# Patient Record
Sex: MALE | Race: WHITE | NOT HISPANIC OR LATINO | Employment: OTHER | URBAN - METROPOLITAN AREA
[De-identification: names, ages, dates, MRNs, and addresses within clinical notes are randomized per-mention and may not be internally consistent; named-entity substitution may affect disease eponyms.]

---

## 2017-04-03 DIAGNOSIS — I70.0 ATHEROSCLEROSIS OF AORTA (HCC): ICD-10-CM

## 2017-04-24 ENCOUNTER — HOSPITAL ENCOUNTER (OUTPATIENT)
Dept: NON INVASIVE DIAGNOSTICS | Facility: CLINIC | Age: 67
Discharge: HOME | End: 2017-04-24
Payer: COMMERCIAL

## 2017-04-24 DIAGNOSIS — I70.0 ATHEROSCLEROSIS OF AORTA (HCC): ICD-10-CM

## 2017-04-24 PROCEDURE — 93978 VASCULAR STUDY: CPT

## 2017-05-08 ENCOUNTER — TRANSCRIBE ORDERS (OUTPATIENT)
Dept: ADMINISTRATIVE | Facility: HOSPITAL | Age: 67
End: 2017-05-08

## 2017-05-08 ENCOUNTER — ALLSCRIPTS OFFICE VISIT (OUTPATIENT)
Dept: OTHER | Facility: OTHER | Age: 67
End: 2017-05-08

## 2017-05-08 ENCOUNTER — APPOINTMENT (OUTPATIENT)
Dept: LAB | Facility: CLINIC | Age: 67
End: 2017-05-08
Payer: COMMERCIAL

## 2017-05-08 DIAGNOSIS — I70.212 ATHEROSCLEROSIS OF NATIVE ARTERY OF LEFT LOWER EXTREMITY WITH INTERMITTENT CLAUDICATION (HCC): ICD-10-CM

## 2017-05-08 DIAGNOSIS — I70.212 ATHEROSCLEROSIS OF NATIVE ARTERY OF LEFT LOWER EXTREMITY WITH INTERMITTENT CLAUDICATION (HCC): Primary | ICD-10-CM

## 2017-05-08 LAB
BUN SERPL-MCNC: 13 MG/DL (ref 5–25)
CREAT SERPL-MCNC: 0.79 MG/DL (ref 0.6–1.3)
GFR SERPL CREATININE-BSD FRML MDRD: >60 ML/MIN/1.73SQ M

## 2017-05-08 PROCEDURE — 36415 COLL VENOUS BLD VENIPUNCTURE: CPT

## 2017-05-08 PROCEDURE — 84520 ASSAY OF UREA NITROGEN: CPT

## 2017-05-08 PROCEDURE — 82565 ASSAY OF CREATININE: CPT

## 2017-05-15 ENCOUNTER — HOSPITAL ENCOUNTER (OUTPATIENT)
Dept: CT IMAGING | Facility: HOSPITAL | Age: 67
Discharge: HOME | End: 2017-05-15
Attending: SURGERY
Payer: COMMERCIAL

## 2017-05-15 DIAGNOSIS — I70.212 ATHEROSCLEROSIS OF NATIVE ARTERY OF LEFT LOWER EXTREMITY WITH INTERMITTENT CLAUDICATION (HCC): ICD-10-CM

## 2017-05-15 PROCEDURE — 75635 CT ANGIO ABDOMINAL ARTERIES: CPT

## 2017-05-15 RX ADMIN — IOHEXOL 120 ML: 350 INJECTION, SOLUTION INTRAVENOUS at 19:22

## 2017-05-22 ENCOUNTER — ALLSCRIPTS OFFICE VISIT (OUTPATIENT)
Dept: OTHER | Facility: OTHER | Age: 67
End: 2017-05-22

## 2017-06-20 ENCOUNTER — GENERIC CONVERSION - ENCOUNTER (OUTPATIENT)
Dept: OTHER | Facility: OTHER | Age: 67
End: 2017-06-20

## 2017-07-06 ENCOUNTER — GENERIC CONVERSION - ENCOUNTER (OUTPATIENT)
Dept: OTHER | Facility: OTHER | Age: 67
End: 2017-07-06

## 2018-01-11 NOTE — MISCELLANEOUS
Message  filled out disability forms and gave to front staff to scan in  I called pt and lmom notifying him  Active Problems    1  Abdominal pain, epigastric (789 06) (R10 13)   2  Acute sinusitis (461 9) (J01 90)   3  Acute upper respiratory infection (465 9) (J06 9)   4  Aortic sclerosis   5  Aortic stenosis (424 1) (I35 0)   6  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 (440 21) (I70 212)   7  Bronchitis (490) (J40)   8  Cough (786 2) (R05)   9  Dermatitis (692 9) (L30 9)   10  Fatigue (780 79) (R53 83)   11  History of allergy (V15 09) (Z88 9)   12  Hypertension (401 9) (I10)   13  Iliac artery stenosis, left (447 1) (I77 1)   14  Nontoxic single thyroid nodule (241 0) (E04 1)   15  Peripheral arterial disease (443 9) (I73 9)   16  Screening for genitourinary condition (V81 6) (Z13 89)   17  Screening for neurological condition (V80 09) (Z13 89)   18  Solitary pulmonary nodule (793 11) (R91 1)   19  Tendinitis of shoulder, unspecified laterality (726 10) (M75 80)   20  Trigger middle finger of right hand (727 03) (M65 331)    Current Meds   1  AmLODIPine Besylate 2 5 MG Oral Tablet; take one tablet by mouth every day; Therapy: 02Apr2012 to (Evaluate:16Nov2017)  Requested for: 21Nov2016; Last   Rx:21Nov2016 Ordered   2  Centrum Ultra Mens TABS Recorded   3  Tylenol 325 MG Oral Tablet; 2qd Recorded    Allergies    1   Aleve TABS    Signatures   Electronically signed by : Nicky Dennis, ; Jul 6 2017  3:44PM EST                       (Author)

## 2018-01-12 VITALS
HEIGHT: 70 IN | SYSTOLIC BLOOD PRESSURE: 130 MMHG | DIASTOLIC BLOOD PRESSURE: 72 MMHG | HEART RATE: 80 BPM | WEIGHT: 186 LBS | RESPIRATION RATE: 16 BRPM | BODY MASS INDEX: 26.63 KG/M2

## 2018-01-13 VITALS
HEART RATE: 72 BPM | HEIGHT: 70 IN | SYSTOLIC BLOOD PRESSURE: 134 MMHG | WEIGHT: 182.44 LBS | RESPIRATION RATE: 16 BRPM | DIASTOLIC BLOOD PRESSURE: 78 MMHG | BODY MASS INDEX: 26.12 KG/M2

## 2018-01-22 ENCOUNTER — ALLSCRIPTS OFFICE VISIT (OUTPATIENT)
Dept: OTHER | Facility: OTHER | Age: 68
End: 2018-01-22

## 2018-01-23 NOTE — PROGRESS NOTES
Assessment   1  Acute sinusitis (461 9) (J01 90)   2  Cough (786 2) (R05)    Plan   Acute sinusitis    · Amoxicillin 875 MG Oral Tablet; TAKE 1 TABLET EVERY 12 HOURS DAILY   · PredniSONE 10 MG Oral Tablet; TAKE 3 TABLETS DAILY FOR 2 DAYS, 2 TABLETS    DAILY FOR 2 DAYS AND 1 TABLET DAILY FOR 2 DAYS, THEN STOP  Acute sinusitis, Cough    · Promethazine-DM 6 25-15 MG/5ML Oral Syrup; SWALLOW 1 TSP Every 6 hours    PRN cough    Discussion/Summary      Acute sinusitis with patient plan to treat with 10 day course of amoxicillin and 6 day course of prednisone with patient plan to treat with promethazine DM for congestion and cough   patient instructed to call if not feeling better within 72 hours or if symptoms worsen  The patient was counseled regarding instructions for management,-- risk factor reductions,-- impressions,-- risks and benefits of treatment options,-- importance of compliance with treatment  Possible side effects of new medications were reviewed with the patient/guardian today  The treatment plan was reviewed with the patient/guardian  The patient/guardian understands and agrees with the treatment plan      Chief Complaint   1  Cold Symptoms    History of Present Illness   HPI: 78year old male presenting with a dry nonproductive cough and wheezing for past 4-5 days  Patient reports chills and headache but denies fever and/or but generalized body aches being associated with current illness  Patient was taking OTC medications to treat symptoms without much relief  Cold Symptoms: Elyse Chino presents with complaints of cold symptoms        Associated symptoms include dry cough,-- headache,-- wheezing-- and-- chills, but-- no sneezing,-- no nasal congestion,-- no runny nose,-- no post nasal drainage,-- no scratchy throat,-- no sore throat,-- no hoarseness,-- no productive cough,-- no facial pressure,-- no facial pain,-- no plugged ear(s),-- no ear pain,-- no swollen lymph nodes,-- no shortness of breath,-- no fatigue,-- no weakness,-- no nausea,-- no vomiting,-- no diarrhea-- and-- no fever  Review of Systems        Constitutional: chills-- and-- feeling tired  ENT: no complaints of earache, no loss of hearing, no nosebleeds or nasal discharge, no sore throat or hoarseness  Cardiovascular: no complaints of slow or fast heart rate, no chest pain, no palpitations, no leg claudication or lower extremity edema  Respiratory: cough  Gastrointestinal: no complaints of abdominal pain, no constipation, no nausea or vomiting, no diarrhea or bloody stools  Genitourinary: no complaints of dysuria or incontinence, no hesitancy, no nocturia, no genital lesion, no inadequacy of penile erection  Musculoskeletal: no complaints of arthralgia, no myalgia, no joint swelling or stiffness, no limb pain or swelling  Integumentary: no complaints of skin rash or lesion, no itching or dry skin, no skin wounds  Neurological: headache  Preventive Quality 65 and Older: Falls Risk: The patient fell 0 times in the past 12 months  The patient is currently asymptomatic Symptoms Include: The patient currently has no urinary incontinence symptoms  Active Problems   1  Abdominal pain, epigastric (789 06) (R10 13)   2  Acute sinusitis (461 9) (J01 90)   3  Acute upper respiratory infection (465 9) (J06 9)   4  Aortic sclerosis   5  Aortic stenosis (424 1) (I35 0)   6  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 (440 21) (I70 212)   7  Bronchitis (490) (J40)   8  Cough (786 2) (R05)   9  Dermatitis (692 9) (L30 9)   10  Fatigue (780 79) (R53 83)   11  History of allergy (V15 09) (Z88 9)   12  Hypertension (401 9) (I10)   13  Iliac artery stenosis, left (447 1) (I77 1)   14  Nontoxic single thyroid nodule (241 0) (E04 1)   15  Peripheral arterial disease (443 9) (I73 9)   16  Screening for genitourinary condition (V81 6) (Z13 89)   17   Screening for neurological condition (V80 09) (Z13 89)   18  Solitary pulmonary nodule (793 11) (R91 1)   19  Tendinitis of shoulder, unspecified laterality (726 10) (M75 80)   20  Trigger middle finger of right hand (727 03) (M65 331)    Past Medical History   Active Problems And Past Medical History Reviewed: The active problems and past medical history were reviewed and updated today  Family History   Mother    1  Family history of Breast Cancer (V16 3)   2  Family history of Cancer   3  Family history of Diabetes Mellitus (V18 0)  Family History Reviewed: The family history was reviewed and updated today  Social History    · Denied: History of Alcohol Use (History)   · Daily Coffee Consumption (___ Cups/Day)   · Daily Cola Consumption (___ Cans/Day)   · Denied: History of Daily Tea Consumption (___ Cups/Day)   · Dependence on nicotine from cigarettes (305 1) (F17 210)   · Marital History - Currently    · Personal Protective Equipment Seatbelts  The social history was reviewed and updated today  Surgical History   1  History of Appendectomy   2  History of Heart Surgery   3  History of Heart Surgery   4  History of Intraoperative Transluminal Angioplasty Aortic   5  History of Knee Surgery   6  History of PTA Iliac Initial Stenosis With Stent   7  History of Thyroid Surgery   8  History of Tonsillectomy  Surgical History Reviewed: The surgical history was reviewed and updated today  Current Meds    1  AmLODIPine Besylate 2 5 MG Oral Tablet; take one tablet by mouth every day; Therapy: 02Apr2012 to (Evaluate:16Nov2017)  Requested for: 21Nov2016; Last     Rx:21Nov2016 Ordered   2  Centrum Ultra Mens TABS Recorded   3  Tylenol 325 MG Oral Tablet; 2qd Recorded     The medication list was reviewed and updated today  Allergies   1   Aleve TABS    Vitals    Recorded: [id number] 02:38PM   Temperature 98 5 F   Heart Rate 76   Systolic 783   Diastolic 84   Height 5 ft 10 in   Weight 192 lb 6 oz   BMI Calculated 27 6   BSA Calculated 2 05     Physical Exam        Constitutional      General appearance: No acute distress, well appearing and well nourished  Eyes      Conjunctiva and lids: No swelling, erythema, or discharge  Pupils and irises: Equal, round and reactive to light  Ears, Nose, Mouth, and Throat      External inspection of ears and nose: Normal        Otoscopic examination: Abnormal  -- TM dull bilaterally  Nasal mucosa, septum, and turbinates: Abnormal  -- Bilateral erythematous turbinates with thick nasal congestion injection present positive head tilt  Oropharynx: Abnormal  -- posterior pharynx erythema with visible post nasal drip injection  Pulmonary      Respiratory effort: No increased work of breathing or signs of respiratory distress  Auscultation of lungs: Abnormal  -- few scattered rhonchi in upper airways and Expiratory wheezing noted in upper lung fields  Cardiovascular      Auscultation of heart: Normal rate and rhythm, normal S1 and S2, without murmurs  Examination of extremities for edema and/or varicosities: Normal        Abdomen      Abdomen: Non-tender, no masses  Lymphatic      Palpation of lymph nodes in neck: Abnormal  -- positive anterior cervical lymphadenopathy  Musculoskeletal      Digits and nails: Normal without clubbing or cyanosis  Psychiatric      Orientation to person, place and time: Normal        Mood and affect: Normal           Results/Data   PHQ-2 Adult Depression Screening [id number] 02:37PM User, s      Test Name Result Flag Reference   PHQ-2 Adult Depression Score 0     Over the last two weeks, how often have you been bothered by any of the following problems?      Little interest or pleasure in doing things: Not at all - 0     Feeling down, depressed, or hopeless: Not at all - 0   PHQ-2 Adult Depression Screening Negative          Signatures    Electronically signed by : Pearl Lancaster Raoul Danielle; Jan 22 2018  7:29PM EST                       (Author)     Electronically signed by : PHIL Keller ; Jan 22 2018  9:13PM EST                       (Author)

## 2018-04-11 DIAGNOSIS — I10 ESSENTIAL HYPERTENSION: Primary | ICD-10-CM

## 2018-04-11 RX ORDER — AMLODIPINE BESYLATE 2.5 MG/1
1 TABLET ORAL DAILY
COMMUNITY
Start: 2012-04-02 | End: 2018-11-19 | Stop reason: ALTCHOICE

## 2018-04-12 RX ORDER — AMLODIPINE BESYLATE 2.5 MG/1
2.5 TABLET ORAL DAILY
Qty: 90 TABLET | Refills: 3 | Status: SHIPPED | OUTPATIENT
Start: 2018-04-12 | End: 2019-06-24 | Stop reason: SDUPTHER

## 2018-11-19 ENCOUNTER — OFFICE VISIT (OUTPATIENT)
Dept: FAMILY MEDICINE CLINIC | Facility: CLINIC | Age: 68
End: 2018-11-19
Payer: MEDICARE

## 2018-11-19 VITALS
HEIGHT: 70 IN | TEMPERATURE: 98.4 F | BODY MASS INDEX: 26.97 KG/M2 | HEART RATE: 76 BPM | SYSTOLIC BLOOD PRESSURE: 130 MMHG | WEIGHT: 188.4 LBS | DIASTOLIC BLOOD PRESSURE: 70 MMHG

## 2018-11-19 DIAGNOSIS — I73.9 PERIPHERAL ARTERIAL DISEASE (HCC): ICD-10-CM

## 2018-11-19 DIAGNOSIS — I35.0 AORTIC VALVE STENOSIS, ETIOLOGY OF CARDIAC VALVE DISEASE UNSPECIFIED: ICD-10-CM

## 2018-11-19 DIAGNOSIS — R10.13 ABDOMINAL PAIN, EPIGASTRIC: ICD-10-CM

## 2018-11-19 DIAGNOSIS — Z12.5 SCREENING FOR PROSTATE CANCER: ICD-10-CM

## 2018-11-19 DIAGNOSIS — I70.212 ATHEROSCLER OF NATIVE ARTERY OF LEFT LEG WITH INTERMIT CLAUDICATION (HCC): ICD-10-CM

## 2018-11-19 DIAGNOSIS — R53.83 FATIGUE, UNSPECIFIED TYPE: Primary | ICD-10-CM

## 2018-11-19 DIAGNOSIS — I77.1 ILIAC ARTERY STENOSIS, LEFT (HCC): ICD-10-CM

## 2018-11-19 DIAGNOSIS — R09.81 NASAL CONGESTION: ICD-10-CM

## 2018-11-19 DIAGNOSIS — G89.29 CHRONIC PAIN OF RIGHT KNEE: ICD-10-CM

## 2018-11-19 DIAGNOSIS — Z12.2 ENCOUNTER FOR SCREENING FOR LUNG CANCER: ICD-10-CM

## 2018-11-19 DIAGNOSIS — M25.561 CHRONIC PAIN OF RIGHT KNEE: ICD-10-CM

## 2018-11-19 DIAGNOSIS — J44.9 MODERATE COPD (CHRONIC OBSTRUCTIVE PULMONARY DISEASE) (HCC): ICD-10-CM

## 2018-11-19 DIAGNOSIS — R06.9 ABNORMAL BREATHING: ICD-10-CM

## 2018-11-19 DIAGNOSIS — I10 ESSENTIAL HYPERTENSION: ICD-10-CM

## 2018-11-19 DIAGNOSIS — R05.9 COUGH: ICD-10-CM

## 2018-11-19 PROCEDURE — 99215 OFFICE O/P EST HI 40 MIN: CPT | Performed by: FAMILY MEDICINE

## 2018-11-19 PROCEDURE — 94010 BREATHING CAPACITY TEST: CPT | Performed by: FAMILY MEDICINE

## 2018-11-19 RX ORDER — LORATADINE 10 MG/1
10 TABLET ORAL DAILY
Qty: 30 TABLET | Refills: 1 | Status: SHIPPED | OUTPATIENT
Start: 2018-11-19 | End: 2019-05-14 | Stop reason: SDUPTHER

## 2018-11-19 RX ORDER — FLUTICASONE PROPIONATE 50 MCG
2 SPRAY, SUSPENSION (ML) NASAL DAILY
Qty: 1 BOTTLE | Refills: 1 | Status: SHIPPED | OUTPATIENT
Start: 2018-11-19 | End: 2019-05-14 | Stop reason: SDUPTHER

## 2018-11-19 RX ORDER — OMEPRAZOLE 20 MG/1
20 CAPSULE, DELAYED RELEASE ORAL DAILY
Qty: 30 CAPSULE | Refills: 0 | Status: SHIPPED | OUTPATIENT
Start: 2018-11-19 | End: 2018-12-19

## 2018-11-19 NOTE — PROGRESS NOTES
Assessment/Plan: Aortic stenosis  Asymptomatic  Cont to watch  Recheck 6m    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 ([facility] )  Overdue for f/u  Recheck CT angio of abd/lwoer extremities  Urged smoking cessation  Cont present meds  Check labs  Recheck 4 weeks    Hypertension  Stable  Cont present treatment  Check labs  Recheck 6m    Iliac artery stenosis, left (HCC)  Recheck CT angiogram  Cont present care    Peripheral arterial disease ([facility] )  Recheck CT angiogram    Abdominal pain, epigastric  ? Cause  Check labs  Trial of omeprazole  Recheck 4 weeks    Fatigue  ? cause  Refer for home sleep study  Check labs  Recheck 4 weeks    Moderate COPD (chronic obstructive pulmonary disease) ([facility])  Check Lung cancer screen CT  Trial of Dulera 200/5, 2 puff bid  Recheck 4 weeks       Diagnoses and all orders for this visit:    Fatigue, unspecified type  -     TSH, 3rd generation with Free T4 reflex; Future  -     Home Study; Future    Essential hypertension    Peripheral arterial disease ([facility] )  -     CTA abdominal w run off wo contrast; Future  -     CBC and differential; Future  -     Lipid panel; Future  -     Comprehensive metabolic panel; Future    Iliac artery stenosis, left (HCC)  -     CTA abdominal w run off wo contrast; Future    Abdominal pain, epigastric  -     Ambulatory referral to Gastroenterology; Future  -     omeprazole (PriLOSEC) 20 mg delayed release capsule; Take 1 capsule (20 mg total) by mouth daily    Abnormal breathing  -     POCT spirometry  -     Home Study; Future    Cough  Comments:  trial of Dulera (sample given)  Orders:  -     POCT spirometry    Nasal congestion  Comments:  ? allergy  Start loratadine and flonase  Recheck 4 weeks  Orders:  -     loratadine (CLARITIN) 10 mg tablet;  Take 1 tablet (10 mg total) by mouth daily  -     fluticasone (FLONASE) 50 mcg/act nasal spray; 2 sprays into each nostril daily 2 sprays bilat qd    Aortic valve stenosis, etiology of cardiac valve disease unspecified    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 (HCC)    Chronic pain of right knee  Comments:  check xray  Orders:  -     XR knee 3 vw right non injury; Future    Encounter for screening for lung cancer  -     CT lung screening program; Future    Screening for prostate cancer  -     PSA, Total Screen; Future    Moderate COPD (chronic obstructive pulmonary disease) (HCC)  -     mometasone-formoterol (DULERA) 200-5 MCG/ACT inhaler; Inhale 2 puffs 2 (two) times a day Rinse mouth after use  Subjective:      Patient ID: Josh Lima is a 80 y o  male  f/u multiple med issues  - pt with long hx of worsening fatigue  Wife states that "he can sleep for four days and still be tired"  Does not feel refreshed on awakening  Has hx of snoring  Has frequent awakening and coughs a lot at night    - Still smoking approx 1/4-1/2 ppd  Does get SOB with exertion  (+) frequent coughing  No hemoptysis  - (+) chronic nasal congestion over the last year  Occ sinus pressure  No fever/chills  - frequent diarrhea with occasional abd pain (epigastric)  Does have hx of gallstones  Seems to be off and on over the last year  No hematochezia or melena  No leakage  No change in weight  Denies depression or anhedonia  - has some worsening R knee pain  May have twisted it a year ago  No swelling  Worse witjh prolonged standing/ambulating  No swelling  - L leg cramping, worse at night  Has hx of PAD - has not had repeat vascular study or seen Vasc surgery in over 2 years  Has hx of 3 leg stents  - pt overdue for labs, colonoscopy      GI Problem   The primary symptoms include fatigue, abdominal pain, diarrhea, myalgias and arthralgias  Primary symptoms do not include fever, nausea or vomiting  The myalgias are not associated with weakness  The illness does not include chills  Cough   Associated symptoms include myalgias   Pertinent negatives include no chills, fever or sore throat  The following portions of the patient's history were reviewed and updated as appropriate:   He  has no past medical history on file  He   Patient Active Problem List    Diagnosis Date Noted    Moderate COPD (chronic obstructive pulmonary disease) ([place] [address] ) 11/20/2018    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 ([place] [address] ) 05/08/2017    Iliac artery stenosis, left ([place] [address] ) 05/08/2017    Aortic stenosis 05/08/2017    Abdominal pain, epigastric 01/08/2015    Fatigue 01/21/2013    Hypertension 01/21/2013    Peripheral arterial disease ([place] [address] ) 10/05/2012     He  has no past surgical history on file  He  has no tobacco, alcohol, and drug history on file  Current Outpatient Prescriptions   Medication Sig Dispense Refill    amLODIPine (NORVASC) 2 5 mg tablet Take 1 tablet (2 5 mg total) by mouth daily 90 tablet 3    Multiple Vitamins-Minerals (CENTRUM ULTRA MENS) TABS Take by mouth      fluticasone (FLONASE) 50 mcg/act nasal spray 2 sprays into each nostril daily 2 sprays bilat qd 1 Bottle 1    loratadine (CLARITIN) 10 mg tablet Take 1 tablet (10 mg total) by mouth daily 30 tablet 1    mometasone-formoterol (DULERA) 200-5 MCG/ACT inhaler Inhale 2 puffs 2 (two) times a day Rinse mouth after use  1 Inhaler 0    omeprazole (PriLOSEC) 20 mg delayed release capsule Take 1 capsule (20 mg total) by mouth daily 30 capsule 0     No current facility-administered medications for this visit  He is allergic to naproxen       Review of Systems   Constitutional: Positive for fatigue  Negative for chills and fever  HENT: Positive for congestion and sinus pressure  Negative for sinus pain, sore throat and voice change  Eyes: Negative  Respiratory: Positive for cough and choking  Cardiovascular: Negative  Gastrointestinal: Positive for abdominal pain and diarrhea  Negative for abdominal distention, blood in stool, nausea, rectal pain and vomiting  Endocrine: Negative  Genitourinary: Negative  Musculoskeletal: Positive for arthralgias, gait problem, joint swelling and myalgias  Negative for neck pain and neck stiffness  Skin: Negative  Allergic/Immunologic: Negative  Neurological: Negative for dizziness, weakness, light-headedness and numbness  Hematological: Negative  Psychiatric/Behavioral: Negative  Objective:      /70   Pulse 76   Temp 98 4 °F (36 9 °C)   Ht 5' 10" (1 778 m)   Wt 85 5 kg (188 lb 6 4 oz)   BMI 27 03 kg/m²          Physical Exam   Constitutional: He is oriented to person, place, and time  He appears well-developed and well-nourished  HENT:   Head: Normocephalic and atraumatic  Right Ear: External ear normal    Left Ear: External ear normal    Mouth/Throat: Oropharynx is clear and moist    Mild nasal congestion without visible masses   Eyes: Pupils are equal, round, and reactive to light  Conjunctivae and EOM are normal    Neck: Normal range of motion  Neck supple  No JVD present  No thyromegaly present  Cardiovascular: Normal rate and regular rhythm  Murmur (mild RERE over RUSB) heard  Unable to appreciate pedal or popliteal pulses   Pulmonary/Chest: Effort normal and breath sounds normal  He exhibits no tenderness  Poor air movement throughout   Abdominal: Soft  Bowel sounds are normal  He exhibits no distension and no mass  There is no tenderness  Musculoskeletal: Normal range of motion  He exhibits no edema  Lymphadenopathy:     He has no cervical adenopathy  Neurological: He is alert and oriented to person, place, and time  He has normal reflexes  No cranial nerve deficit  He exhibits normal muscle tone  Coordination normal    Skin: Skin is warm  Psychiatric: He has a normal mood and affect   His behavior is normal  Judgment and thought content normal        Office Spirometry Results: moderate-severe obstruction

## 2018-11-20 PROBLEM — J44.9 MODERATE COPD (CHRONIC OBSTRUCTIVE PULMONARY DISEASE) (HCC): Status: ACTIVE | Noted: 2018-11-20

## 2018-11-20 NOTE — ASSESSMENT & PLAN NOTE
Overdue for f/u  Recheck CT angio of abd/lwoer extremities  Urged smoking cessation  Cont present meds  Check labs   Recheck 4 weeks

## 2018-11-23 ENCOUNTER — APPOINTMENT (OUTPATIENT)
Dept: LAB | Facility: CLINIC | Age: 68
End: 2018-11-23
Payer: MEDICARE

## 2018-11-23 ENCOUNTER — TRANSCRIBE ORDERS (OUTPATIENT)
Dept: LAB | Facility: CLINIC | Age: 68
End: 2018-11-23

## 2018-11-23 DIAGNOSIS — I73.9 PERIPHERAL ARTERIAL DISEASE (HCC): ICD-10-CM

## 2018-11-23 DIAGNOSIS — Z12.5 SCREENING FOR PROSTATE CANCER: ICD-10-CM

## 2018-11-23 DIAGNOSIS — R53.83 FATIGUE, UNSPECIFIED TYPE: ICD-10-CM

## 2018-11-23 LAB
ALBUMIN SERPL BCP-MCNC: 3.6 G/DL (ref 3.5–5)
ALP SERPL-CCNC: 90 U/L (ref 46–116)
ALT SERPL W P-5'-P-CCNC: 35 U/L (ref 12–78)
ANION GAP SERPL CALCULATED.3IONS-SCNC: 2 MMOL/L (ref 4–13)
AST SERPL W P-5'-P-CCNC: 29 U/L (ref 5–45)
BASOPHILS # BLD AUTO: 0.01 THOUSANDS/ΜL (ref 0–0.1)
BASOPHILS NFR BLD AUTO: 0 % (ref 0–1)
BILIRUB SERPL-MCNC: 0.6 MG/DL (ref 0.2–1)
BUN SERPL-MCNC: 16 MG/DL (ref 5–25)
CALCIUM SERPL-MCNC: 9 MG/DL (ref 8.3–10.1)
CHLORIDE SERPL-SCNC: 103 MMOL/L (ref 100–108)
CHOLEST SERPL-MCNC: 208 MG/DL (ref 50–200)
CO2 SERPL-SCNC: 29 MMOL/L (ref 21–32)
CREAT SERPL-MCNC: 0.88 MG/DL (ref 0.6–1.3)
EOSINOPHIL # BLD AUTO: 0.02 THOUSAND/ΜL (ref 0–0.61)
EOSINOPHIL NFR BLD AUTO: 1 % (ref 0–6)
ERYTHROCYTE [DISTWIDTH] IN BLOOD BY AUTOMATED COUNT: 14 % (ref 11.6–15.1)
GFR SERPL CREATININE-BSD FRML MDRD: 89 ML/MIN/1.73SQ M
GLUCOSE P FAST SERPL-MCNC: 143 MG/DL (ref 65–99)
HCT VFR BLD AUTO: 42.9 % (ref 36.5–49.3)
HDLC SERPL-MCNC: 39 MG/DL (ref 40–60)
HGB BLD-MCNC: 13.9 G/DL (ref 12–17)
IMM GRANULOCYTES # BLD AUTO: 0.01 THOUSAND/UL (ref 0–0.2)
IMM GRANULOCYTES NFR BLD AUTO: 0 % (ref 0–2)
LDLC SERPL CALC-MCNC: 101 MG/DL (ref 0–100)
LYMPHOCYTES # BLD AUTO: 0.53 THOUSANDS/ΜL (ref 0.6–4.47)
LYMPHOCYTES NFR BLD AUTO: 15 % (ref 14–44)
MCH RBC QN AUTO: 28.5 PG (ref 26.8–34.3)
MCHC RBC AUTO-ENTMCNC: 32.4 G/DL (ref 31.4–37.4)
MCV RBC AUTO: 88 FL (ref 82–98)
MONOCYTES # BLD AUTO: 0.47 THOUSAND/ΜL (ref 0.17–1.22)
MONOCYTES NFR BLD AUTO: 13 % (ref 4–12)
NEUTROPHILS # BLD AUTO: 2.58 THOUSANDS/ΜL (ref 1.85–7.62)
NEUTS SEG NFR BLD AUTO: 71 % (ref 43–75)
NONHDLC SERPL-MCNC: 169 MG/DL
NRBC BLD AUTO-RTO: 0 /100 WBCS
PLATELET # BLD AUTO: 148 THOUSANDS/UL (ref 149–390)
PMV BLD AUTO: 10.7 FL (ref 8.9–12.7)
POTASSIUM SERPL-SCNC: 4 MMOL/L (ref 3.5–5.3)
PROT SERPL-MCNC: 7.1 G/DL (ref 6.4–8.2)
PSA SERPL-MCNC: 0.9 NG/ML (ref 0–4)
RBC # BLD AUTO: 4.88 MILLION/UL (ref 3.88–5.62)
SODIUM SERPL-SCNC: 134 MMOL/L (ref 136–145)
TRIGL SERPL-MCNC: 341 MG/DL
TSH SERPL DL<=0.05 MIU/L-ACNC: 1.57 UIU/ML (ref 0.36–3.74)
WBC # BLD AUTO: 3.62 THOUSAND/UL (ref 4.31–10.16)

## 2018-11-23 PROCEDURE — 80061 LIPID PANEL: CPT

## 2018-11-23 PROCEDURE — 80053 COMPREHEN METABOLIC PANEL: CPT

## 2018-11-23 PROCEDURE — G0103 PSA SCREENING: HCPCS

## 2018-11-23 PROCEDURE — 36415 COLL VENOUS BLD VENIPUNCTURE: CPT

## 2018-11-23 PROCEDURE — 85025 COMPLETE CBC W/AUTO DIFF WBC: CPT

## 2018-11-23 PROCEDURE — 84443 ASSAY THYROID STIM HORMONE: CPT

## 2018-11-27 ENCOUNTER — TELEPHONE (OUTPATIENT)
Dept: SLEEP CENTER | Facility: CLINIC | Age: 68
End: 2018-11-27

## 2018-11-27 NOTE — TELEPHONE ENCOUNTER
----- Message from Nyasia Akbar MD sent at 11/20/2018  5:15 PM EST -----  Approved  ----- Message -----  From: Glendy Corcoran: 11/20/2018   9:37 AM  To: Sleep Medicine [place] [facility], #    PLEASE REVIEW FOR APPROVAL OR DENIAL AND WHY

## 2018-11-28 ENCOUNTER — HOSPITAL ENCOUNTER (OUTPATIENT)
Dept: SLEEP CENTER | Facility: CLINIC | Age: 68
Discharge: HOME | End: 2018-11-28
Payer: MEDICARE

## 2018-11-28 DIAGNOSIS — R06.9 ABNORMAL BREATHING: ICD-10-CM

## 2018-11-28 DIAGNOSIS — R53.83 FATIGUE, UNSPECIFIED TYPE: ICD-10-CM

## 2018-11-28 PROCEDURE — G0399 HOME SLEEP TEST/TYPE 3 PORTA: HCPCS

## 2018-11-30 ENCOUNTER — HOSPITAL ENCOUNTER (OUTPATIENT)
Dept: CT IMAGING | Facility: HOSPITAL | Age: 68
Discharge: HOME | End: 2018-11-30
Payer: COMMERCIAL

## 2018-11-30 ENCOUNTER — HOSPITAL ENCOUNTER (OUTPATIENT)
Dept: CT IMAGING | Facility: HOSPITAL | Age: 68
Discharge: HOME | End: 2018-11-30
Payer: MEDICARE

## 2018-11-30 ENCOUNTER — HOSPITAL ENCOUNTER (OUTPATIENT)
Dept: RADIOLOGY | Facility: HOSPITAL | Age: 68
Discharge: HOME | End: 2018-11-30
Payer: MEDICARE

## 2018-11-30 DIAGNOSIS — I73.9 PERIPHERAL ARTERIAL DISEASE (HCC): ICD-10-CM

## 2018-11-30 DIAGNOSIS — I77.1 ILIAC ARTERY STENOSIS, LEFT (HCC): ICD-10-CM

## 2018-11-30 DIAGNOSIS — G89.29 CHRONIC PAIN OF RIGHT KNEE: ICD-10-CM

## 2018-11-30 DIAGNOSIS — Z12.2 ENCOUNTER FOR SCREENING FOR LUNG CANCER: ICD-10-CM

## 2018-11-30 DIAGNOSIS — M25.561 CHRONIC PAIN OF RIGHT KNEE: ICD-10-CM

## 2018-11-30 PROCEDURE — 73562 X-RAY EXAM OF KNEE 3: CPT

## 2018-11-30 PROCEDURE — 75635 CT ANGIO ABDOMINAL ARTERIES: CPT

## 2018-11-30 RX ADMIN — IOHEXOL 120 ML: 350 INJECTION, SOLUTION INTRAVENOUS at 07:45

## 2018-12-06 ENCOUNTER — TELEPHONE (OUTPATIENT)
Dept: SLEEP CENTER | Facility: CLINIC | Age: 68
End: 2018-12-06

## 2018-12-06 NOTE — TELEPHONE ENCOUNTER
----- Message from Adalgisa Timmons MD sent at 12/4/2018  9:54 AM EST -----  Sleep clinic if needed    Thanks

## 2018-12-14 ENCOUNTER — OFFICE VISIT (OUTPATIENT)
Dept: GASTROENTEROLOGY | Facility: CLINIC | Age: 68
End: 2018-12-14
Payer: MEDICARE

## 2018-12-14 VITALS
DIASTOLIC BLOOD PRESSURE: 72 MMHG | BODY MASS INDEX: 27.52 KG/M2 | TEMPERATURE: 99.4 F | HEART RATE: 92 BPM | WEIGHT: 192.2 LBS | SYSTOLIC BLOOD PRESSURE: 139 MMHG | HEIGHT: 70 IN

## 2018-12-14 DIAGNOSIS — R10.13 ABDOMINAL PAIN, EPIGASTRIC: ICD-10-CM

## 2018-12-14 DIAGNOSIS — Z12.11 COLON CANCER SCREENING: Primary | ICD-10-CM

## 2018-12-14 DIAGNOSIS — K64.4 HEMORRHOIDS, EXTERNAL: ICD-10-CM

## 2018-12-14 PROCEDURE — 99204 OFFICE O/P NEW MOD 45 MIN: CPT | Performed by: INTERNAL MEDICINE

## 2018-12-14 NOTE — PROGRESS NOTES
Consultation - [address] [facility]  Bubba Taylor 80 y o  male MRN: [id number]          Assessment & Plan:    Very pleasant 50-year-old gentleman who is a retired  about, here for average risk screening colonoscopy, last examination was greater than 10 years ago and sounds like he had rectal polyps which were hyperplastic at that time  He was also started on PPI therapy recently  1   Screening colonoscopy:  Appears to be average risk  -we will schedule his procedure  -discussed with him the risks of the procedure including bleeding, surgery, perforation, missed polyp detection rate    2  Epigastric discomfort:  Appears to be mild and resolved at this time  -recommend the patient transition to H2 blocker and see if symptoms persist, if so we can further evaluate    _____________________________________________________________        CC:   Evaluation for colonoscopy    HPI:  Bubba Taylor is a 80 y o male who was referred for evaluation of colonoscopy  As you know this is a 50-year-old gentleman, reports his last colonoscopy was greater than 10 years ago  He was told he had small polyps, possibly hyperplastic polyps at that time  Patient denies any significant lower GI symptoms, denies any abdominal pain, nausea, vomiting, heartburn, dysphagia, diarrhea, constipation, melena, rectal bleeding  He does have irritated hemorrhoids at times which can bleed a small amount when he scratched or irritated some  Recently was seen by PCP and started on omeprazole for possible epigastric pain though he denies this at this time  Family history is negative for GI or associated malignancies  He is retired   He is hoping to quit smoking soon but continues smoke  Drinks minimally  ROS:  The remainder of the ROS was negative except for the pertinent positives mentioned in HPI           Allergies: Naproxen    Medications:   Current Outpatient Prescriptions:    amLODIPine (NORVASC) 2 5 mg tablet, Take 1 tablet (2 5 mg total) by mouth daily, Disp: 90 tablet, Rfl: 3    fluticasone (FLONASE) 50 mcg/act nasal spray, 2 sprays into each nostril daily 2 sprays bilat qd, Disp: 1 Bottle, Rfl: 1    loratadine (CLARITIN) 10 mg tablet, Take 1 tablet (10 mg total) by mouth daily, Disp: 30 tablet, Rfl: 1    mometasone-formoterol (DULERA) 200-5 MCG/ACT inhaler, Inhale 2 puffs 2 (two) times a day Rinse mouth after use , Disp: 1 Inhaler, Rfl: 0    Multiple Vitamins-Minerals (CENTRUM ULTRA MENS) TABS, Take by mouth, Disp: , Rfl:     omeprazole (PriLOSEC) 20 mg delayed release capsule, Take 1 capsule (20 mg total) by mouth daily, Disp: 30 capsule, Rfl: 0    hydrocortisone (PROCTOZONE-HC) 2 5 % rectal cream, Insert into the rectum 2 (two) times a day, Disp: 30 g, Rfl: 0    Na Sulfate-K Sulfate-Mg Sulf (SUPREP BOWEL PREP KIT) 17 5-3 13-1 6 GM/177ML SOLN, Take 1 Bottle by mouth once for 1 dose, Disp: 1 Bottle, Rfl: 0'    Past Medical History:   Diagnosis Date    Colon polyp        Past Surgical History:   Procedure Laterality Date    COLONOSCOPY  2008    [facility]       History reviewed  No pertinent family history  reports that he has been smoking  He has never used smokeless tobacco  He reports that he does not drink alcohol or use drugs            Physical Exam:     /72 (BP Location: Right arm, Patient Position: Sitting, Cuff Size: Standard)   Pulse 92   Temp 99 4 °F (37 4 °C) (Tympanic)   Ht 5' 10" (1 778 m)   Wt 87 2 kg (192 lb 3 2 oz)   BMI 27 58 kg/m²     Gen: wn/wd, NAD, healthy-appearing male  HEENT: anicteric, MMM, no cervical LAD  CVS: RRR, no m/r/g  CHEST: CTA b/l  ABD: +BS, soft, NT,ND, no hepatosplenomegaly  EXT: no c/c/e  NEURO: aaox3  SKIN: NO rashes

## 2018-12-14 NOTE — LETTER
December 14, 2018     Sonia Tejada MD  [address]    Patient: Ted Jones   YOB: 1950   Date of Visit: 12/14/2018       Dear Dr Melida Ambrocio:    Thank you for referring Ted Jones to me for evaluation  Below are my notes for this consultation  If you have questions, please do not hesitate to call me  I look forward to following your patient along with you  Sincerely,        Simi Novak MD        CC: No Recipients  Simi Novak MD  12/14/2018  9:17 AM  Sign at close encounter  Consultation - [facility]  Ted Jones 80 y o  male MRN: [id number]          Assessment & Plan:    Very pleasant 52-year-old gentleman who is a retired  about, here for average risk screening colonoscopy, last examination was greater than 10 years ago and sounds like he had rectal polyps which were hyperplastic at that time  He was also started on PPI therapy recently  1   Screening colonoscopy:  Appears to be average risk  -we will schedule his procedure  -discussed with him the risks of the procedure including bleeding, surgery, perforation, missed polyp detection rate    2  Epigastric discomfort:  Appears to be mild and resolved at this time  -recommend the patient transition to H2 blocker and see if symptoms persist, if so we can further evaluate    _____________________________________________________________        CC:   Evaluation for colonoscopy    HPI:  Ted Jones is a 80 y o male who was referred for evaluation of colonoscopy  As you know this is a 52-year-old gentleman, reports his last colonoscopy was greater than 10 years ago  He was told he had small polyps, possibly hyperplastic polyps at that time  Patient denies any significant lower GI symptoms, denies any abdominal pain, nausea, vomiting, heartburn, dysphagia, diarrhea, constipation, melena, rectal bleeding      He does have irritated hemorrhoids at times which can bleed a small amount when he scratched or irritated some  Recently was seen by PCP and started on omeprazole for possible epigastric pain though he denies this at this time  Family history is negative for GI or associated malignancies  He is retired   He is hoping to quit smoking soon but continues smoke  Drinks minimally  ROS:  The remainder of the ROS was negative except for the pertinent positives mentioned in HPI  Allergies: Naproxen    Medications:   Current Outpatient Prescriptions:     amLODIPine (NORVASC) 2 5 mg tablet, Take 1 tablet (2 5 mg total) by mouth daily, Disp: 90 tablet, Rfl: 3    fluticasone (FLONASE) 50 mcg/act nasal spray, 2 sprays into each nostril daily 2 sprays bilat qd, Disp: 1 Bottle, Rfl: 1    loratadine (CLARITIN) 10 mg tablet, Take 1 tablet (10 mg total) by mouth daily, Disp: 30 tablet, Rfl: 1    mometasone-formoterol (DULERA) 200-5 MCG/ACT inhaler, Inhale 2 puffs 2 (two) times a day Rinse mouth after use , Disp: 1 Inhaler, Rfl: 0    Multiple Vitamins-Minerals (CENTRUM ULTRA MENS) TABS, Take by mouth, Disp: , Rfl:     omeprazole (PriLOSEC) 20 mg delayed release capsule, Take 1 capsule (20 mg total) by mouth daily, Disp: 30 capsule, Rfl: 0    hydrocortisone (PROCTOZONE-HC) 2 5 % rectal cream, Insert into the rectum 2 (two) times a day, Disp: 30 g, Rfl: 0    Na Sulfate-K Sulfate-Mg Sulf (SUPREP BOWEL PREP KIT) 17 5-3 13-1 6 GM/177ML SOLN, Take 1 Bottle by mouth once for 1 dose, Disp: 1 Bottle, Rfl: 0'    Past Medical History:   Diagnosis Date    Colon polyp        Past Surgical History:   Procedure Laterality Date    COLONOSCOPY  2008    boonswang       History reviewed  No pertinent family history  reports that he has been smoking  He has never used smokeless tobacco  He reports that he does not drink alcohol or use drugs            Physical Exam:     /72 (BP Location: Right arm, Patient Position: Sitting, Cuff Size: Standard)   Pulse 92   Temp 99 4 °F (37 4 °C) (Tympanic)   Ht 5' 10" (1 778 m)   Wt 87 2 kg (192 lb 3 2 oz)   BMI 27 58 kg/m²      Gen: wn/wd, NAD, healthy-appearing male  HEENT: anicteric, MMM, no cervical LAD  CVS: RRR, no m/r/g  CHEST: CTA b/l  ABD: +BS, soft, NT,ND, no hepatosplenomegaly  EXT: no c/c/e  NEURO: aaox3  SKIN: NO rashes

## 2018-12-19 ENCOUNTER — ANESTHESIA EVENT (OUTPATIENT)
Dept: GASTROENTEROLOGY | Facility: AMBULARY SURGERY CENTER | Age: 68
End: 2018-12-19
Payer: MEDICARE

## 2018-12-19 NOTE — PRE-PROCEDURE INSTRUCTIONS
Pre-Surgery Instructions:   Medication Instructions    amLODIPine (NORVASC) 2 5 mg tablet Instructed patient per Anesthesia Guidelines   fluticasone (FLONASE) 50 mcg/act nasal spray Patient was instructed by Physician and understands   hydrocortisone (PROCTOZONE-HC) 2 5 % rectal cream Patient was instructed by Physician and understands   loratadine (CLARITIN) 10 mg tablet Patient was instructed by Physician and understands   mometasone-formoterol (DULERA) 200-5 MCG/ACT inhaler Instructed patient per Anesthesia Guidelines   Multiple Vitamins-Minerals (CENTRUM ULTRA MENS) TABS Patient was instructed by Physician and understands

## 2018-12-19 NOTE — ANESTHESIA PREPROCEDURE EVALUATION
Review of Systems/Medical History  Patient summary reviewed  Chart reviewed  No history of anesthetic complications     Cardiovascular  Hyperlipidemia, Hypertension , CAD , PVD (2012 s/p stenting),    Pulmonary  Smoker cigarette smoker  , COPD mild- PRN medicaiton ,        GI/Hepatic            Endo/Other     GYN       Hematology   Musculoskeletal       Neurology   Psychology           Physical Exam    Airway    Mallampati score: II  TM Distance: >3 FB  Neck ROM: full     Dental       Cardiovascular  Rhythm: regular, Rate: normal,     Pulmonary  Breath sounds clear to auscultation,     Other Findings        Anesthesia Plan  ASA Score- 3     Anesthesia Type- IV sedation with anesthesia with ASA Monitors  Additional Monitors:   Airway Plan:         Plan Factors-    Induction- intravenous  Postoperative Plan-     Informed Consent- Anesthetic plan and risks discussed with patient  I personally reviewed this patient with the CRNA  Discussed and agreed on the Anesthesia Plan with the CRNA  Royer Jones

## 2018-12-20 ENCOUNTER — ANESTHESIA (OUTPATIENT)
Dept: GASTROENTEROLOGY | Facility: AMBULARY SURGERY CENTER | Age: 68
End: 2018-12-20
Payer: MEDICARE

## 2018-12-20 ENCOUNTER — HOSPITAL ENCOUNTER (OUTPATIENT)
Facility: AMBULARY SURGERY CENTER | Age: 68
Setting detail: OUTPATIENT SURGERY
Discharge: HOME | End: 2018-12-20
Attending: INTERNAL MEDICINE | Admitting: INTERNAL MEDICINE
Payer: MEDICARE

## 2018-12-20 VITALS
HEART RATE: 65 BPM | HEIGHT: 70 IN | RESPIRATION RATE: 16 BRPM | SYSTOLIC BLOOD PRESSURE: 145 MMHG | TEMPERATURE: 98.4 F | WEIGHT: 192 LBS | BODY MASS INDEX: 27.49 KG/M2 | OXYGEN SATURATION: 100 % | DIASTOLIC BLOOD PRESSURE: 75 MMHG

## 2018-12-20 DIAGNOSIS — R10.13 ABDOMINAL PAIN, EPIGASTRIC: ICD-10-CM

## 2018-12-20 DIAGNOSIS — Z12.11 COLON CANCER SCREENING: ICD-10-CM

## 2018-12-20 DIAGNOSIS — K64.4 HEMORRHOIDS, EXTERNAL: ICD-10-CM

## 2018-12-20 PROCEDURE — 45385 COLONOSCOPY W/LESION REMOVAL: CPT | Performed by: INTERNAL MEDICINE

## 2018-12-20 PROCEDURE — 88305 TISSUE EXAM BY PATHOLOGIST: CPT | Performed by: PATHOLOGY

## 2018-12-20 RX ORDER — PROPOFOL 10 MG/ML
INJECTION, EMULSION INTRAVENOUS AS NEEDED
Status: DISCONTINUED | OUTPATIENT
Start: 2018-12-20 | End: 2018-12-20 | Stop reason: SURG

## 2018-12-20 RX ORDER — LIDOCAINE HYDROCHLORIDE 10 MG/ML
INJECTION, SOLUTION INFILTRATION; PERINEURAL AS NEEDED
Status: DISCONTINUED | OUTPATIENT
Start: 2018-12-20 | End: 2018-12-20 | Stop reason: SURG

## 2018-12-20 RX ORDER — SODIUM CHLORIDE 9 MG/ML
75 INJECTION, SOLUTION INTRAVENOUS CONTINUOUS
Status: DISCONTINUED | OUTPATIENT
Start: 2018-12-20 | End: 2018-12-20 | Stop reason: HOSPADM

## 2018-12-20 RX ADMIN — SODIUM CHLORIDE 75 ML/HR: 0.9 INJECTION, SOLUTION INTRAVENOUS at 13:10

## 2018-12-20 RX ADMIN — PROPOFOL 50 MG: 10 INJECTION, EMULSION INTRAVENOUS at 14:13

## 2018-12-20 RX ADMIN — LIDOCAINE HYDROCHLORIDE 50 MG: 10 INJECTION, SOLUTION INFILTRATION; PERINEURAL at 13:55

## 2018-12-20 RX ADMIN — PROPOFOL 150 MG: 10 INJECTION, EMULSION INTRAVENOUS at 13:55

## 2018-12-20 RX ADMIN — SODIUM CHLORIDE: 0.9 INJECTION, SOLUTION INTRAVENOUS at 13:09

## 2018-12-20 RX ADMIN — PROPOFOL 20 MG: 10 INJECTION, EMULSION INTRAVENOUS at 14:00

## 2018-12-20 RX ADMIN — PROPOFOL 50 MG: 10 INJECTION, EMULSION INTRAVENOUS at 14:18

## 2018-12-20 NOTE — OP NOTE
OPERATIVE REPORT  PATIENT NAME: Sandor Wells    :  1950  MRN: [id number]  Pt Location: [facility] GI ROOM 02    SURGERY DATE: 2018    Surgeon(s) and Role:     * Cherylene Needy, MD - Primary    Preop Diagnosis:  Abdominal pain, epigastric [R10 13]  Colon cancer screening [Z12 11]  Hemorrhoids, external [K64 4]    Post-Op Diagnosis Codes:     * Abdominal pain, epigastric [R10 13]     * Colon cancer screening [Z12 11]     * Hemorrhoids, external [K64 4]    Procedure(s) (LRB):  COLONOSCOPY (N/A)    Specimen(s):  ID Type Source Tests Collected by Time Destination   1 : ascending colon polyps x 3 cold snare bx Tissue Colon TISSUE Shari Lovell MD 2018 1359    2 : transverse colon polyp x 2 hot snare bx Tissue Colon TISSUE Shari Lovell MD 2018 1408    3 : decsending colon polyp x 3-cold snare bx Tissue Colon TISSUE Shari Lovell MD 2018 1416    4 : rectal polyp cold snare bx Tissue Colon TISSUE EXAM Cherylene Needy, MD 2018 1421        Estimated Blood Loss:   Minimal    Colonoscopy Procedure Note    Procedure: Colonoscopy    Sedation: Monitored anesthesia care, check anesthesia records      ASA Class: 2    INDICATIONS:  Screening colonoscopy    POST-OP DIAGNOSIS: See the impression below    Procedure Details     Prior colonoscopy: More than 10 years ago  Informed consent was obtained for the procedure, including sedation  Risks of perforation, hemorrhage, adverse drug reaction and aspiration were discussed  The patient was placed in the left lateral decubitus position  Based on the pre-procedure assessment, including review of the patient's medical history, medications, allergies, and review of systems, he had been deemed to be an appropriate candidate for conscious sedation; he was therefore sedated with the medications listed below  The patient was monitored continuously with telemetry, pulse oximetry, blood pressure monitoring, and direct observations        A rectal examination was performed  The variable-stiffness pediatric colonoscope was inserted into the rectum and advanced under direct vision to the cecum, which was identified by the ileocecal valve and appendiceal orifice  The quality of the colonic preparation was good  A careful inspection was made as the colonoscope was withdrawn, including a retroflexed view of the rectum; findings and interventions are described below  Findings: Three polyps in the ascending colon measuring 10 to 15 mm, semi sessile removed by cold snare  Somewhat pedunculated polyp in the transverse measuring 15 mm removed by hot snare  Diminutive transverse colon polyp removed by cold snare  Three polyps in the sigmoid colon measuring 4 to 6 mm removed by cold snare  4 mm rectal polyp removed by cold snare  Internal external hemorrhoids           Complications: None; patient tolerated the procedure well      Impression:    Total of 9 polyps removed as outlined above with a combination of cold snare and hot snare  Internal external hemorrhoids    Recommendations:    Discharge home  Resume regular diet  Resume home medications  Repeat colonoscopy in 3 years, or sooner based on pathology  Avoid NSAIDs  Call with any abdominal pain, bleeding, fevers

## 2018-12-20 NOTE — DISCHARGE INSTR - AVS FIRST PAGE
OPERATIVE REPORT  PATIENT NAME: Khalida Stokes    :  1950  MRN: [id number]  Pt Location: [facility] GI ROOM 02    SURGERY DATE: 2018    Surgeon(s) and Role:     * Latisha Swenson MD - Primary    Preop Diagnosis:  Abdominal pain, epigastric [R10 13]  Colon cancer screening [Z12 11]  Hemorrhoids, external [K64 4]    Post-Op Diagnosis Codes:     * Abdominal pain, epigastric [R10 13]     * Colon cancer screening [Z12 11]     * Hemorrhoids, external [K64 4]    Procedure(s) (LRB):  COLONOSCOPY (N/A)    Specimen(s):  ID Type Source Tests Collected by Time Destination   1 : ascending colon polyps x 3 cold snare bx Tissue Colon TISSUE Colon MD Josue 2018 1359    2 : transverse colon polyp x 2 hot snare bx Tissue Colon TISSUE Colon MD Josue 2018 1408    3 : decsending colon polyp x 3-cold snare bx Tissue Colon TISSUE Colon MD Josue 2018 1416    4 : rectal polyp cold snare bx Tissue Colon TISSUE EXAM Latisha Swenson MD 2018 1421        Estimated Blood Loss:   Minimal    Colonoscopy Procedure Note    Procedure: Colonoscopy    Sedation: Monitored anesthesia care, check anesthesia records      ASA Class: 2    INDICATIONS:  Screening colonoscopy    POST-OP DIAGNOSIS: See the impression below    Procedure Details     Prior colonoscopy: More than 10 years ago  Informed consent was obtained for the procedure, including sedation  Risks of perforation, hemorrhage, adverse drug reaction and aspiration were discussed  The patient was placed in the left lateral decubitus position  Based on the pre-procedure assessment, including review of the patient's medical history, medications, allergies, and review of systems, he had been deemed to be an appropriate candidate for conscious sedation; he was therefore sedated with the medications listed below  The patient was monitored continuously with telemetry, pulse oximetry, blood pressure monitoring, and direct observations        A rectal examination was performed  The variable-stiffness pediatric colonoscope was inserted into the rectum and advanced under direct vision to the cecum, which was identified by the ileocecal valve and appendiceal orifice  The quality of the colonic preparation was good  A careful inspection was made as the colonoscope was withdrawn, including a retroflexed view of the rectum; findings and interventions are described below  Findings: Three polyps in the ascending colon measuring 10 to 15 mm, semi sessile removed by cold snare  Somewhat pedunculated polyp in the transverse measuring 15 mm removed by hot snare  Diminutive transverse colon polyp removed by cold snare  Three polyps in the sigmoid colon measuring 4 to 6 mm removed by cold snare  4 mm rectal polyp removed by cold snare  Internal external hemorrhoids           Complications: None; patient tolerated the procedure well      Impression:    Total of 9 polyps removed as outlined above with a combination of cold snare and hot snare  Internal external hemorrhoids    Recommendations:    Discharge home  Resume regular diet  Resume home medications  Repeat colonoscopy in 3 years, or sooner based on pathology  Avoid NSAIDs  Call with any abdominal pain, bleeding, fevers

## 2018-12-20 NOTE — DISCHARGE INSTRUCTIONS
Discharge home  Resume regular diet  Resume home medications  Repeat colonoscopy in 3 years, or sooner based on pathology  Avoid NSAIDs  Call with any abdominal pain, bleeding, fevers

## 2018-12-20 NOTE — H&P
History and Physical - [facility]  Madison Harada 80 y o  male MRN: [id number]    HPI: Madison Harada is a 80y o  year old male who presents for screening colonoscopy  Review of Systems    Historical Information   Past Medical History:   Diagnosis Date    Colon polyp     COPD [place]chronic obstructive pulmonary disease) ([place] [address] )     Coronary artery disease     GERD (gastroesophageal reflux disease)     Hyperlipidemia     Hypertension     PVD (peripheral vascular disease) ([place] [address] )     Seasonal allergies      Past Surgical History:   Procedure Laterality Date    APPENDECTOMY  2016    CARDIAC SURGERY  2012    stents x3    COLONOSCOPY  2008    boonswang    KNEE ARTHROSCOPY Bilateral     post work injury    SHOULDER SURGERY Right     cheese tumor removed    TONSILLECTOMY      age 25     Social History   History   Alcohol Use No     History   Drug Use No     History   Smoking Status    Current Every Day Smoker    Packs/day: 0 75    Years: 50 00   Smokeless Tobacco    Never Used     Family History   Problem Relation Age of Onset    Heart disease Mother     Cancer Mother         breast    No Known Problems Father        Meds/Allergies     Prescriptions Prior to Admission   Medication    amLODIPine (NORVASC) 2 5 mg tablet    fluticasone (FLONASE) 50 mcg/act nasal spray    hydrocortisone (PROCTOZONE-HC) 2 5 % rectal cream    loratadine (CLARITIN) 10 mg tablet    mometasone-formoterol (DULERA) 200-5 MCG/ACT inhaler    Multiple Vitamins-Minerals (CENTRUM ULTRA MENS) TABS       Allergies   Allergen Reactions    Naproxen Swelling and Rash       Objective     Blood pressure 148/76, pulse 81, temperature 98 4 °F (36 9 °C), temperature source Tympanic, resp  rate 16, height 5' 10" (1 778 m), weight 87 1 kg (192 lb), SpO2 98 %        PHYSICAL EXAM    Gen: NAD  CV: RRR  CHEST: Clear  ABD: soft, NT/ND  EXT: no edema  Neuro: AAO      ASSESSMENT/PLAN:  This is a 80y o  year old male here for screening colonoscopy         PLAN:   Procedure: colonoscopy

## 2018-12-21 ENCOUNTER — OFFICE VISIT (OUTPATIENT)
Dept: FAMILY MEDICINE CLINIC | Facility: CLINIC | Age: 68
End: 2018-12-21
Payer: MEDICARE

## 2018-12-21 VITALS
TEMPERATURE: 97.9 F | BODY MASS INDEX: 26.69 KG/M2 | HEART RATE: 80 BPM | SYSTOLIC BLOOD PRESSURE: 130 MMHG | HEIGHT: 70 IN | WEIGHT: 186.4 LBS | DIASTOLIC BLOOD PRESSURE: 68 MMHG

## 2018-12-21 DIAGNOSIS — I10 ESSENTIAL HYPERTENSION: ICD-10-CM

## 2018-12-21 DIAGNOSIS — R53.83 FATIGUE, UNSPECIFIED TYPE: ICD-10-CM

## 2018-12-21 DIAGNOSIS — Z00.00 MEDICARE ANNUAL WELLNESS VISIT, INITIAL: Primary | ICD-10-CM

## 2018-12-21 DIAGNOSIS — J44.9 MODERATE COPD (CHRONIC OBSTRUCTIVE PULMONARY DISEASE) (HCC): ICD-10-CM

## 2018-12-21 DIAGNOSIS — I70.212 ATHEROSCLER OF NATIVE ARTERY OF LEFT LEG WITH INTERMIT CLAUDICATION (HCC): ICD-10-CM

## 2018-12-21 PROCEDURE — 99214 OFFICE O/P EST MOD 30 MIN: CPT | Performed by: FAMILY MEDICINE

## 2018-12-21 PROCEDURE — G0438 PPPS, INITIAL VISIT: HCPCS | Performed by: FAMILY MEDICINE

## 2018-12-21 RX ORDER — ATORVASTATIN CALCIUM 40 MG/1
40 TABLET, FILM COATED ORAL DAILY
Qty: 30 TABLET | Refills: 5 | Status: SHIPPED | OUTPATIENT
Start: 2018-12-21 | End: 2019-07-05 | Stop reason: SDUPTHER

## 2018-12-21 NOTE — PROGRESS NOTES
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 [facility]     I reviewed CT findings with pt  Refer back to Vasc surgery  Start Atorvastatin 40mg qd  Cont ASA  Recheck 6m - earlier if worse         Relevant Medications    atorvastatin (LIPITOR) 40 mg tablet    Other Relevant Orders    Comprehensive metabolic panel    Lipid panel    Hypertension     Well controlled  Cont present treatment  Monitor labs  Recheck 6m              Other    Fatigue     Improved with treatment of nasal congestion  Cont to monitor  Recheck 6m           Other Visit Diagnoses     Medicare annual wellness visit, initial    -  Primary        Health Maintenance Due   Topic Date Due    Hepatitis C Screening  1950    Medicare Annual Wellness Visit (AWV)  1950    DTaP,Tdap,and Td Vaccines (1 - Tdap) 02/01/1972    Fall Risk  02/01/2016    Pneumococcal PPSV23/PCV13 65+ Years / High and Highest Risk (1 of 2 - PCV13) 02/01/2016    INFLUENZA VACCINE  07/01/2018         HPI:  Vinicio Ayala is a 80 y o  male here for his Initial Wellness Visit      Patient Active Problem List   Diagnosis    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 ([facility] )    Fatigue    Hypertension    Iliac artery stenosis, left (HCC)    Aortic stenosis    Moderate COPD (chronic obstructive pulmonary disease) ([facility] )     Past Medical History:   Diagnosis Date    Colon polyp     COPD [facility]chronic obstructive pulmonary disease) [facility]     Coronary artery disease     GERD (gastroesophageal reflux disease)     Hyperlipidemia     Hypertension     PVD (peripheral vascular disease) ([facility] )     Seasonal allergies      Past Surgical History:   Procedure Laterality Date    APPENDECTOMY  2016   [facility] CARDIAC SURGERY  2012    stents x3    COLONOSCOPY  2008    boonswang    KNEE ARTHROSCOPY Bilateral     post work injury    [place] COLONOSCOPY FLX DX W/COLLJ SPEC WHEN PFRMD N/A 12/20/2018    Procedure: COLONOSCOPY;  Surgeon: Randolph Varner MD;  [facility] GI LAB; Service: Gastroenterology    SHOULDER SURGERY Right     cheese tumor removed    TONSILLECTOMY      age 25     Family History   Problem Relation Age of Onset    Heart disease Mother     Cancer Mother         breast    No Known Problems Father      History   Smoking Status    Current Every Day Smoker    Packs/day: 0 75    Years: 50 00   Smokeless Tobacco    Never Used     History   Alcohol Use No      History   Drug Use No       Current Outpatient Prescriptions   Medication Sig Dispense Refill    amLODIPine (NORVASC) 2 5 mg tablet Take 1 tablet (2 5 mg total) by mouth daily (Patient taking differently: Take 2 5 mg by mouth every morning  ) 90 tablet 3    fluticasone (FLONASE) 50 mcg/act nasal spray 2 sprays into each nostril daily 2 sprays bilat qd 1 Bottle 1    loratadine (CLARITIN) 10 mg tablet Take 1 tablet (10 mg total) by mouth daily 30 tablet 1    mometasone-formoterol (DULERA) 200-5 MCG/ACT inhaler Inhale 2 puffs 2 (two) times a day Rinse mouth after use  1 Inhaler 0    Multiple Vitamins-Minerals (CENTRUM ULTRA MENS) TABS Take by mouth      atorvastatin (LIPITOR) 40 mg tablet Take 1 tablet (40 mg total) by mouth daily 30 tablet 5    hydrocortisone (PROCTOZONE-HC) 2 5 % rectal cream Insert into the rectum 2 (two) times a day (Patient not taking: Reported on 12/21/2018 ) 30 g 0     No current facility-administered medications for this visit  Allergies   Allergen Reactions    Naproxen Swelling and Rash       There is no immunization history on file for this patient      Patient Care Team:  Clau Bryan MD as PCP - DO Tomy Marcelino MD Belva Snider, MD Mae Ferretti, MD (Gastroenterology)    Medicare Screening Tests and Risk Assessments:      Health Risk Assessment:  Patient rates overall health as very good  Patient feels that their physical health rating is Slightly worse  Eyesight was rated as Same  Hearing was rated as Same  Patient feels that their emotional and mental health rating is Same  Pain experienced by patient in the last 7 days has been None  Patient states that he has experienced no weight loss or gain in last 6 months  Emotional/Mental Health:  Patient has been feeling nervous/anxious  PHQ-9 Depression Screening:    Frequency of the following problems over the past two weeks:      1  Little interest or pleasure in doing things: 0 - not at all      2  Feeling down, depressed, or hopeless: 0 - not at all  PHQ-2 Score: 0          Broken Bones/Falls: Fall Risk Assessment:    In the past year, patient has experienced: No history of falling in past year          Bladder/Bowel:  Patient has not leaked urine accidently in the last six months  Patient reports no loss of bowel control  Immunizations:  Patient has not had a flu vaccination within the last year  Patient has not received a pneumonia shot  Patient has not received a shingles shot  Patient has not received tetanus/diphtheria shot  Home Safety:  Patient does not have trouble with stairs inside or outside of their home  Patient currently reports that there are no safety hazards present in home, working smoke alarms, working carbon monoxide detectors  Preventative Screenings:   no prostate cancer screen performed, colon cancer screen completed, 12/20/2018  cholesterol screen completed, glaucoma eye exam completed,     Nutrition:  Current diet: Regular with servings of the following:    Medications:  Patient is currently taking over-the-counter supplements       List of OTC medications includes: fish oil   Patient is able to manage medications  Lifestyle Choices:  Patient reports current tobacco use  Patient reports no alcohol use  Patient drives a vehicle  Patient wears seat belt  Current level of exercise of physical activity described by patient as: walking, yardwork   Activities of Daily Living:  Can get out of bed by his or her self, able to dress self, able to make own meals, able to do own shopping, able to bathe self, can do own laundry/housekeeping, can manage own money, pay bills and track expenses    Previous Hospitalizations:  Hospitalization or ED visit in past 12 months  Number of hospitalizations within the last year: more than 4  Additional Comments: Appendicitis     Advanced Directives:  Patient has decided on a power of   Patient has spoken to designated power of   Patient has not completed advanced directive          Preventative Screening/Counseling:      Cardiovascular:      General: Risks and Benefits Discussed and Screening Current      Counseling: Healthy Diet, Healthy Weight and Improve Cholesterol          Diabetes:      General: Risks and Benefits Discussed and Screening Current      Counseling: Healthy Diet, Healthy Weight and Improve Physical Activity          Colorectal Cancer:      General: Risks and Benefits Discussed and Screening Current          Prostate Cancer:      General: Risks and Benefits Discussed and Screening Current          Osteoporosis:      General: Screening Not Indicated          AAA:      General: Screening Current      Comments: Pt with hx of PAD        Glaucoma:      General: Risks and Benefits Discussed and Screening Current          HIV:      General: Screening Not Indicated          Hepatitis C:      General: Risks and Benefits Discussed        Immunizations:      Influenza: Risks & Benefits Discussed and Patient Declines      Pneumococcal: Risks & Benefits Discussed and Patient Declines      Shingrix: Risks & Benefits Discussed and Patient Declines      Other Preventative Counseling (Non-Medicare):   Increase physical activity and Nutrition Counseling

## 2018-12-21 NOTE — PROGRESS NOTES
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 ([facility] )  I reviewed CT findings with pt  Refer back to Vasc surgery  Start Atorvastatin 40mg qd  Cont ASA  Recheck 6m - earlier if worse    Hypertension  Well controlled  Cont present treatment  Monitor labs  Recheck 6m      Fatigue  Improved with treatment of nasal congestion  Cont to monitor  Recheck 6m       Diagnoses and all orders for this visit:    Moderate COPD (chronic obstructive pulmonary disease) ([facility] )    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 (HCC)  -     atorvastatin (LIPITOR) 40 mg tablet; Take 1 tablet (40 mg total) by mouth daily  -     Comprehensive metabolic panel; Future  -     Lipid panel; Future    Essential hypertension    Fatigue, unspecified type          Subjective:      Patient ID: Glendy Armenta is a 80 y o  male  f/u multiple med issues  - pt notes that his nose cleared quickly on Flonase and Claritin  Pt started sleeping better and notes that his energy is now much better  - had colonoscopy yesterday  9 polyps removed  Repeat in 3 years - earlier based on path  - I reviewed recent labs with patient  LDL was 101 with an HDL of 39  Triglycerides were elevated, around 390  Fasting sugar was also 143  A1c was not able to be done due to age of sample  Patient does not have any history of diabetes  - I reviewed CT results with patient as well  CT lungs was fortunately unremarkable  Vascular study did show slightly worsening of several peripheral arteries in the lower extremities as well as moderate stenosis of the left renal artery  This was a new finding  Patient is still smoking but trying to cut back  Presently he is down to 4 cigarettes a day    Patient denies any chest pain, palpitations, lightheadedness or other cardiovascular symptoms with without exertion  - patient still having some right knee pain  X-ray did show mild arthritis  Pain seems to be positional with the knee flexed in him leaning back  Patient has difficulty getting up from the floor if he is in this position  No knee swelling noted  - initial AWV done        The following portions of the patient's history were reviewed and updated as appropriate:   He  has a past medical history of Colon polyp; COPD (chronic obstructive pulmonary disease) ([place] [address] ); Coronary artery disease; GERD (gastroesophageal reflux disease); Hyperlipidemia; Hypertension; PVD (peripheral vascular disease) ([place] [address] ); and Seasonal allergies  He   Patient Active Problem List    Diagnosis Date Noted    Moderate COPD (chronic obstructive pulmonary disease) ([place] [address] ) 11/20/2018    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 ([place] [address] ) 05/08/2017    Iliac artery stenosis, left ([place] [address] ) 05/08/2017    Aortic stenosis 05/08/2017    Fatigue 01/21/2013    Hypertension 01/21/2013     He  has a past surgical history that includes Colonoscopy (2008); Cardiac surgery (2012); Knee arthroscopy (Bilateral); Shoulder surgery (Right); Tonsillectomy; Appendectomy (2016); and pr colonoscopy flx dx w/collj spec when pfrmd (N/A, 12/20/2018)  His family history includes Cancer in his mother; Heart disease in his mother; No Known Problems in his father  He  reports that he has been smoking  He has a 37 50 pack-year smoking history  He has never used smokeless tobacco  He reports that he does not drink alcohol or use drugs    Current Outpatient Prescriptions   Medication Sig Dispense Refill    amLODIPine (NORVASC) 2 5 mg tablet Take 1 tablet (2 5 mg total) by mouth daily (Patient taking differently: Take 2 5 mg by mouth every morning  ) 90 tablet 3    fluticasone (FLONASE) 50 mcg/act nasal spray 2 sprays into each nostril daily 2 sprays bilat qd 1 Bottle 1    loratadine (CLARITIN) 10 mg tablet Take 1 tablet (10 mg total) by mouth daily 30 tablet 1    mometasone-formoterol (DULERA) 200-5 MCG/ACT inhaler Inhale 2 puffs 2 (two) times a day Rinse mouth after use  1 Inhaler 0    Multiple Vitamins-Minerals (CENTRUM ULTRA MENS) TABS Take by mouth      atorvastatin (LIPITOR) 40 mg tablet Take 1 tablet (40 mg total) by mouth daily 30 tablet 5    hydrocortisone (PROCTOZONE-HC) 2 5 % rectal cream Insert into the rectum 2 (two) times a day (Patient not taking: Reported on 12/21/2018 ) 30 g 0     No current facility-administered medications for this visit  He is allergic to naproxen       Review of Systems   Constitutional: Negative for chills, fatigue and fever  HENT: Negative for congestion, sinus pain, sinus pressure, sore throat and voice change  Eyes: Negative  Respiratory: Negative  Negative for cough and choking  Cardiovascular: Negative  Gastrointestinal: Negative  Negative for abdominal distention, abdominal pain, blood in stool, diarrhea, nausea, rectal pain and vomiting  Endocrine: Negative  Genitourinary: Negative  Musculoskeletal: Positive for arthralgias  Negative for gait problem, joint swelling, myalgias, neck pain and neck stiffness  Skin: Negative  Allergic/Immunologic: Negative  Neurological: Negative  Negative for dizziness, weakness, light-headedness and numbness  Hematological: Negative  Psychiatric/Behavioral: Negative  Objective:      /68   Pulse 80   Temp 97 9 °F (36 6 °C)   Ht 5' 10" (1 778 m)   Wt 84 6 kg (186 lb 6 4 oz)   BMI 26 75 kg/m²          Physical Exam   Constitutional: He is oriented to person, place, and time  He appears well-developed and well-nourished  HENT:   Head: Normocephalic and atraumatic     Right Ear: External ear normal    Left Ear: External ear normal    Mouth/Throat: Oropharynx is clear and moist    Mild nasal congestion without visible masses   Eyes: Pupils are equal, round, and reactive to light  Conjunctivae and EOM are normal    Neck: Normal range of motion  Neck supple  No JVD present  No thyromegaly present  Cardiovascular: Normal rate and regular rhythm  Murmur (mild RERE over RUSB) heard  Unable to appreciate L pedal or popliteal pulse  1+ R pedal pulse appreciated   Pulmonary/Chest: Effort normal and breath sounds normal  He exhibits no tenderness  Poor air movement throughout   Abdominal: Soft  Bowel sounds are normal  He exhibits no distension and no mass  There is no tenderness  Musculoskeletal: Normal range of motion  He exhibits no edema  Lymphadenopathy:     He has no cervical adenopathy  Neurological: He is alert and oriented to person, place, and time  He has normal reflexes  No cranial nerve deficit  He exhibits normal muscle tone  Coordination normal    Skin: Skin is warm  Psychiatric: He has a normal mood and affect   His behavior is normal  Judgment and thought content normal    PHQ-2 = 0

## 2018-12-22 NOTE — ASSESSMENT & PLAN NOTE
I reviewed CT findings with pt  Refer back to Vasc surgery  Start Atorvastatin 40mg qd  Cont ASA   Recheck 6m - earlier if worse

## 2018-12-22 NOTE — ASSESSMENT & PLAN NOTE
Breathing stable  Ct of lungs with out nodules  Pt is slowly decreasing cigarette use  Presently down to 4 cig/day  Cont to monitor    Recheck 6m

## 2019-01-02 ENCOUNTER — TELEPHONE (OUTPATIENT)
Dept: GASTROENTEROLOGY | Facility: AMBULARY SURGERY CENTER | Age: 69
End: 2019-01-02

## 2019-01-02 NOTE — LETTER
January 2, 2019     [phone]      Dear Mr Deion Griggs: We have attempted to reach you regarding your results  We ask that you please contact our office upon receipt of this letter to receive your results  Thank you in advance for your cooperation and assistance          Sincerely,   Shawna Mccarthy [facility] Staff  [phone]

## 2019-01-02 NOTE — TELEPHONE ENCOUNTER
----- Message from Hanna Hook MD sent at 1/1/2019  9:41 PM EST -----  Please inform the patient that all 9 polyps removed were tubular adenomas, there was no high-grade dysplasia and no cancer  I recommend repeat colonoscopy in 3 years, please put in for recall  Please have the patient call with any questions or concerns

## 2019-04-08 ENCOUNTER — TRANSCRIBE ORDERS (OUTPATIENT)
Dept: LAB | Facility: CLINIC | Age: 69
End: 2019-04-08

## 2019-05-13 ENCOUNTER — APPOINTMENT (OUTPATIENT)
Dept: LAB | Facility: CLINIC | Age: 69
End: 2019-05-13
Payer: MEDICARE

## 2019-05-13 ENCOUNTER — TRANSCRIBE ORDERS (OUTPATIENT)
Dept: LAB | Facility: CLINIC | Age: 69
End: 2019-05-13

## 2019-05-13 ENCOUNTER — TELEPHONE (OUTPATIENT)
Dept: ADMINISTRATIVE | Facility: HOSPITAL | Age: 69
End: 2019-05-13

## 2019-05-13 DIAGNOSIS — I70.212 ATHEROSCLER OF NATIVE ARTERY OF LEFT LEG WITH INTERMIT CLAUDICATION (HCC): ICD-10-CM

## 2019-05-13 DIAGNOSIS — I70.0 ATHEROSCLEROSIS OF AORTA (HCC): Primary | ICD-10-CM

## 2019-05-13 LAB
ALBUMIN SERPL BCP-MCNC: 3.9 G/DL (ref 3.5–5)
ALP SERPL-CCNC: 94 U/L (ref 46–116)
ALT SERPL W P-5'-P-CCNC: 44 U/L (ref 12–78)
ANION GAP SERPL CALCULATED.3IONS-SCNC: 3 MMOL/L (ref 4–13)
AST SERPL W P-5'-P-CCNC: 32 U/L (ref 5–45)
BILIRUB SERPL-MCNC: 0.56 MG/DL (ref 0.2–1)
BUN SERPL-MCNC: 16 MG/DL (ref 5–25)
CALCIUM SERPL-MCNC: 8.6 MG/DL (ref 8.3–10.1)
CHLORIDE SERPL-SCNC: 109 MMOL/L (ref 100–108)
CHOLEST SERPL-MCNC: 141 MG/DL (ref 50–200)
CO2 SERPL-SCNC: 29 MMOL/L (ref 21–32)
CREAT SERPL-MCNC: 0.84 MG/DL (ref 0.6–1.3)
GFR SERPL CREATININE-BSD FRML MDRD: 90 ML/MIN/1.73SQ M
GLUCOSE P FAST SERPL-MCNC: 122 MG/DL (ref 65–99)
HDLC SERPL-MCNC: 49 MG/DL (ref 40–60)
LDLC SERPL CALC-MCNC: 56 MG/DL (ref 0–100)
NONHDLC SERPL-MCNC: 92 MG/DL
POTASSIUM SERPL-SCNC: 3.9 MMOL/L (ref 3.5–5.3)
PROT SERPL-MCNC: 7.6 G/DL (ref 6.4–8.2)
SODIUM SERPL-SCNC: 141 MMOL/L (ref 136–145)
TRIGL SERPL-MCNC: 181 MG/DL

## 2019-05-13 PROCEDURE — 36415 COLL VENOUS BLD VENIPUNCTURE: CPT

## 2019-05-13 PROCEDURE — 80053 COMPREHEN METABOLIC PANEL: CPT

## 2019-05-13 PROCEDURE — 80061 LIPID PANEL: CPT

## 2019-05-14 ENCOUNTER — OFFICE VISIT (OUTPATIENT)
Dept: FAMILY MEDICINE CLINIC | Facility: CLINIC | Age: 69
End: 2019-05-14
Payer: MEDICARE

## 2019-05-14 ENCOUNTER — TELEPHONE (OUTPATIENT)
Dept: FAMILY MEDICINE CLINIC | Facility: CLINIC | Age: 69
End: 2019-05-14

## 2019-05-14 VITALS
DIASTOLIC BLOOD PRESSURE: 80 MMHG | TEMPERATURE: 97.9 F | HEART RATE: 80 BPM | SYSTOLIC BLOOD PRESSURE: 144 MMHG | HEIGHT: 70 IN | BODY MASS INDEX: 27.03 KG/M2 | WEIGHT: 188.8 LBS

## 2019-05-14 DIAGNOSIS — R09.81 NASAL CONGESTION: ICD-10-CM

## 2019-05-14 DIAGNOSIS — H81.10 BENIGN PAROXYSMAL POSITIONAL VERTIGO, UNSPECIFIED LATERALITY: Primary | ICD-10-CM

## 2019-05-14 DIAGNOSIS — J30.9 ALLERGIC RHINITIS, UNSPECIFIED SEASONALITY, UNSPECIFIED TRIGGER: ICD-10-CM

## 2019-05-14 PROCEDURE — 99214 OFFICE O/P EST MOD 30 MIN: CPT | Performed by: FAMILY MEDICINE

## 2019-05-14 PROCEDURE — 1160F RVW MEDS BY RX/DR IN RCRD: CPT | Performed by: FAMILY MEDICINE

## 2019-05-14 RX ORDER — MECLIZINE HCL 12.5 MG/1
12.5 TABLET ORAL EVERY 8 HOURS PRN
Qty: 30 TABLET | Refills: 2 | Status: SHIPPED | OUTPATIENT
Start: 2019-05-14 | End: 2020-07-13 | Stop reason: ALTCHOICE

## 2019-05-14 RX ORDER — FLUTICASONE PROPIONATE 50 MCG
2 SPRAY, SUSPENSION (ML) NASAL DAILY
Qty: 1 BOTTLE | Refills: 1 | Status: SHIPPED | OUTPATIENT
Start: 2019-05-14 | End: 2019-05-14 | Stop reason: SDUPTHER

## 2019-05-14 RX ORDER — FLUTICASONE PROPIONATE 50 MCG
SPRAY, SUSPENSION (ML) NASAL
Qty: 1 BOTTLE | Refills: 2 | Status: SHIPPED | OUTPATIENT
Start: 2019-05-14 | End: 2019-07-11 | Stop reason: SDUPTHER

## 2019-05-14 RX ORDER — LORATADINE 10 MG/1
10 TABLET ORAL DAILY
Qty: 30 TABLET | Refills: 1 | Status: SHIPPED | OUTPATIENT
Start: 2019-05-14

## 2019-05-15 PROBLEM — J30.9 ALLERGIC RHINITIS: Status: ACTIVE | Noted: 2019-05-15

## 2019-05-15 PROBLEM — H81.10 BENIGN PAROXYSMAL POSITIONAL VERTIGO: Status: ACTIVE | Noted: 2019-05-15

## 2019-06-10 ENCOUNTER — HOSPITAL ENCOUNTER (OUTPATIENT)
Dept: NON INVASIVE DIAGNOSTICS | Facility: CLINIC | Age: 69
Discharge: HOME | End: 2019-06-10
Payer: MEDICARE

## 2019-06-10 DIAGNOSIS — I70.0 ATHEROSCLEROSIS OF AORTA (HCC): ICD-10-CM

## 2019-06-10 PROCEDURE — 93978 VASCULAR STUDY: CPT | Performed by: SURGERY

## 2019-06-10 PROCEDURE — 93978 VASCULAR STUDY: CPT

## 2019-06-10 PROCEDURE — 93922 UPR/L XTREMITY ART 2 LEVELS: CPT

## 2019-06-17 ENCOUNTER — OFFICE VISIT (OUTPATIENT)
Dept: VASCULAR SURGERY | Facility: CLINIC | Age: 69
End: 2019-06-17
Payer: MEDICARE

## 2019-06-17 VITALS
DIASTOLIC BLOOD PRESSURE: 80 MMHG | BODY MASS INDEX: 26.04 KG/M2 | SYSTOLIC BLOOD PRESSURE: 140 MMHG | HEART RATE: 90 BPM | WEIGHT: 186 LBS | HEIGHT: 71 IN | TEMPERATURE: 97.8 F

## 2019-06-17 DIAGNOSIS — E78.5 DYSLIPIDEMIA: ICD-10-CM

## 2019-06-17 DIAGNOSIS — I70.1 STENOSIS OF LEFT RENAL ARTERY (HCC): ICD-10-CM

## 2019-06-17 DIAGNOSIS — I70.213 ATHEROSCLEROSIS OF NATIVE ARTERIES OF EXTREMITIES WITH INTERMITTENT CLAUDICATION, BILATERAL LEGS (HCC): ICD-10-CM

## 2019-06-17 DIAGNOSIS — Q25.1 ABDOMINAL AORTIC STENOSIS: Primary | ICD-10-CM

## 2019-06-17 DIAGNOSIS — I77.1 BILATERAL ILIAC ARTERY STENOSIS (HCC): ICD-10-CM

## 2019-06-17 PROCEDURE — 99214 OFFICE O/P EST MOD 30 MIN: CPT | Performed by: NURSE PRACTITIONER

## 2019-06-24 DIAGNOSIS — I10 ESSENTIAL HYPERTENSION: ICD-10-CM

## 2019-06-24 RX ORDER — AMLODIPINE BESYLATE 2.5 MG/1
2.5 TABLET ORAL DAILY
Qty: 90 TABLET | Refills: 3 | Status: SHIPPED | OUTPATIENT
Start: 2019-06-24 | End: 2020-06-25

## 2019-07-05 DIAGNOSIS — I70.212 ATHEROSCLER OF NATIVE ARTERY OF LEFT LEG WITH INTERMIT CLAUDICATION (HCC): ICD-10-CM

## 2019-07-05 RX ORDER — ATORVASTATIN CALCIUM 40 MG/1
TABLET, FILM COATED ORAL
Qty: 90 TABLET | Refills: 5 | Status: SHIPPED | OUTPATIENT
Start: 2019-07-05 | End: 2020-09-28

## 2019-07-05 RX ORDER — ATORVASTATIN CALCIUM 40 MG/1
40 TABLET, FILM COATED ORAL DAILY
Qty: 30 TABLET | Refills: 5 | Status: SHIPPED | OUTPATIENT
Start: 2019-07-05 | End: 2019-07-05 | Stop reason: SDUPTHER

## 2019-07-11 DIAGNOSIS — R09.81 NASAL CONGESTION: ICD-10-CM

## 2019-07-11 RX ORDER — FLUTICASONE PROPIONATE 50 MCG
SPRAY, SUSPENSION (ML) NASAL
Qty: 16 ML | Refills: 0 | Status: SHIPPED | OUTPATIENT
Start: 2019-07-11 | End: 2021-07-26 | Stop reason: ALTCHOICE

## 2019-07-24 ENCOUNTER — OFFICE VISIT (OUTPATIENT)
Dept: FAMILY MEDICINE CLINIC | Facility: CLINIC | Age: 69
End: 2019-07-24
Payer: MEDICARE

## 2019-07-24 VITALS
HEART RATE: 86 BPM | BODY MASS INDEX: 26.94 KG/M2 | DIASTOLIC BLOOD PRESSURE: 80 MMHG | WEIGHT: 188.2 LBS | SYSTOLIC BLOOD PRESSURE: 122 MMHG | HEIGHT: 70 IN | TEMPERATURE: 98.3 F

## 2019-07-24 DIAGNOSIS — I70.213 ATHEROSCLEROSIS OF NATIVE ARTERIES OF EXTREMITIES WITH INTERMITTENT CLAUDICATION, BILATERAL LEGS (HCC): ICD-10-CM

## 2019-07-24 DIAGNOSIS — M25.561 CHRONIC PAIN OF RIGHT KNEE: ICD-10-CM

## 2019-07-24 DIAGNOSIS — I10 ESSENTIAL HYPERTENSION: ICD-10-CM

## 2019-07-24 DIAGNOSIS — Z12.5 PROSTATE CANCER SCREENING: ICD-10-CM

## 2019-07-24 DIAGNOSIS — G89.29 CHRONIC PAIN OF RIGHT KNEE: ICD-10-CM

## 2019-07-24 DIAGNOSIS — J44.9 MODERATE COPD (CHRONIC OBSTRUCTIVE PULMONARY DISEASE) (HCC): Primary | ICD-10-CM

## 2019-07-24 PROCEDURE — 99214 OFFICE O/P EST MOD 30 MIN: CPT | Performed by: FAMILY MEDICINE

## 2019-07-24 NOTE — PROGRESS NOTES
Assessment/Plan: Moderate COPD (chronic obstructive pulmonary disease) (HCC)  Stable  Has cut down on cigarettes but has not stopped  Counseled pt  Cont present inhalers  Recheck 6m    Atherosclerosis of native arteries of extremities with intermittent claudication, bilateral legs (HCC)  Up to date with vascular surgery  Cont to monitor  Counseled re: smoking cessation  Monitor lipids  Encouraged ambulation  Recheck 6m    Hypertension  Well controlled  Cont present treatment  Monitor labs  Discussed diet, weight loss and exercise strategies  BMI Counseling: Body mass index is 27 kg/m²  Discussed the patient's BMI with him  The BMI is above average  BMI counseling and education was provided to the patient  Exercise recommendations include exercising 3-5 times per week  Recheck 6m      Chronic pain of right knee  With hx of meniscal tear  No effusion  Refer to PT  Recheck 2-3 weeks if not improved - earlier if worse       Diagnoses and all orders for this visit:    Moderate COPD (chronic obstructive pulmonary disease) (HCC)    Atherosclerosis of native arteries of extremities with intermittent claudication, bilateral legs (HCC)  -     CBC and differential; Future  -     Comprehensive metabolic panel; Future  -     Lipid panel; Future    Essential hypertension    Chronic pain of right knee  -     Ambulatory referral to Physical Therapy; Future    Prostate cancer screening  -     PSA, Total Screen; Future          Subjective:      Patient ID: Nila Perrin is a 77 y o  male  f/u multiple med issues  - pt notes that his vertigo has completely resolved  Never made it to PT  No new neuro symptoms  - pt has cut back on smoking but has not stopped  Pt may not smoke for 4-5 days then smoke 1/2 pack  - pt up to date with Vasc Surgery  No internvention needed at this time  - Denies CP, palpitations, lightheadedness or other CV symptoms with or without exertion  - No Gi issues   Up to date with colonoscopy  - notes increased R knee pain with kneeling, flexion  Has tough time getting up from floor at times  Has hx of previous meniscal tear  The following portions of the patient's history were reviewed and updated as appropriate: He  has a past medical history of Colon polyp, COPD (chronic obstructive pulmonary disease) ([facility] ), Coronary artery disease, GERD (gastroesophageal reflux disease), Hyperlipidemia, Hypertension, PVD (peripheral vascular disease) ([facility] ), and Seasonal allergies  He   Patient Active Problem List    Diagnosis Date Noted    Chronic pain of right knee 07/24/2019    Dyslipidemia 06/17/2019    Stenosis of left renal artery (HCC) 06/17/2019    Benign paroxysmal positional vertigo 05/15/2019    Allergic rhinitis 05/15/2019    Moderate COPD (chronic obstructive pulmonary disease) ([place] [address] ) 11/20/2018    Atherosclerosis of native arteries of extremities with intermittent claudication, bilateral legs ([facility] ) 05/08/2017    Bilateral iliac artery stenosis (HCC) 05/08/2017    Abdominal aortic stenosis 05/08/2017    Fatigue 01/21/2013    Hypertension 01/21/2013     He  has a past surgical history that includes Colonoscopy (2008); Cardiac surgery (2012); Knee arthroscopy (Bilateral); Shoulder surgery (Right); Tonsillectomy; Appendectomy (2016); and pr colonoscopy flx dx w/collj spec when pfrmd (N/A, 12/20/2018)  He  reports that he has been smoking  He has a 12 50 pack-year smoking history  He has never used smokeless tobacco  He reports that he does not drink alcohol or use drugs    Current Outpatient Medications   Medication Sig Dispense Refill    amLODIPine (NORVASC) 2 5 mg tablet Take 1 tablet (2 5 mg total) by mouth daily 90 tablet 3    atorvastatin (LIPITOR) 40 mg tablet TAKE 1 TABLET(40 MG) BY MOUTH DAILY 90 tablet 5    fluticasone (FLONASE) 50 mcg/act nasal spray INSTILL 2 SPRAYS INTO EACH NOSTRIL DAILY 16 mL 0    loratadine (CLARITIN) 10 mg tablet Take 1 tablet (10 mg total) by mouth daily 30 tablet 1    meclizine (ANTIVERT) 12 5 MG tablet Take 1 tablet (12 5 mg total) by mouth every 8 (eight) hours as needed for dizziness 30 tablet 2    mometasone-formoterol (DULERA) 200-5 MCG/ACT inhaler Inhale 2 puffs 2 (two) times a day Rinse mouth after use  1 Inhaler 0    Multiple Vitamins-Minerals (CENTRUM ULTRA MENS) TABS Take by mouth       No current facility-administered medications for this visit  He is allergic to naproxen       Review of Systems   Constitutional: Negative for chills, fatigue and fever  HENT: Negative for congestion, sinus pressure, sinus pain, sore throat and voice change  Eyes: Negative  Respiratory: Negative  Negative for cough and choking  Cardiovascular: Negative  Gastrointestinal: Negative  Negative for abdominal distention, abdominal pain, blood in stool, diarrhea, nausea, rectal pain and vomiting  Endocrine: Negative  Genitourinary: Negative  Musculoskeletal: Positive for arthralgias  Negative for gait problem, joint swelling, myalgias, neck pain and neck stiffness  Skin: Negative  Allergic/Immunologic: Negative  Neurological: Negative  Negative for dizziness, weakness, light-headedness and numbness  Hematological: Negative  Psychiatric/Behavioral: Negative  Objective:      /80 (BP Location: Right arm, Patient Position: Sitting, Cuff Size: Standard)   Pulse 86   Temp 98 3 °F (36 8 °C)   Ht 5' 10" (1 778 m)   Wt 85 4 kg (188 lb 3 2 oz)   BMI 27 00 kg/m²          Physical Exam   Constitutional: He is oriented to person, place, and time  He appears well-developed and well-nourished  HENT:   Head: Normocephalic and atraumatic  Right Ear: External ear normal    Left Ear: External ear normal    Mouth/Throat: Oropharynx is clear and moist    Mild nasal congestion without visible masses   Eyes: Pupils are equal, round, and reactive to light  Conjunctivae and EOM are normal    Neck: Normal range of motion   Neck supple  No JVD present  No thyromegaly present  Cardiovascular: Normal rate and regular rhythm  Murmur (mild RERE over RUSB) heard  Unable to appreciate L pedal or popliteal pulse  1+ R pedal pulse appreciated   Pulmonary/Chest: Effort normal and breath sounds normal  He exhibits no tenderness  Poor air movement throughout   Abdominal: Soft  Bowel sounds are normal  He exhibits no distension and no mass  There is no tenderness  Musculoskeletal: He exhibits no edema  Right knee: He exhibits normal range of motion, no swelling, no effusion, no deformity, no LCL laxity and no MCL laxity  Tenderness found  Medial joint line, lateral joint line and patellar tendon tenderness noted  Lymphadenopathy:     He has no cervical adenopathy  Neurological: He is alert and oriented to person, place, and time  He has normal reflexes  No cranial nerve deficit  He exhibits normal muscle tone  Coordination normal    Skin: Skin is warm  Psychiatric: He has a normal mood and affect   His behavior is normal  Judgment and thought content normal    PHQ-2 = 0

## 2019-07-25 NOTE — ASSESSMENT & PLAN NOTE
Well controlled  Cont present treatment  Monitor labs  Discussed diet, weight loss and exercise strategies  BMI Counseling: Body mass index is 27 kg/m²  Discussed the patient's BMI with him  The BMI is above average  BMI counseling and education was provided to the patient  Exercise recommendations include exercising 3-5 times per week      Recheck 6m

## 2019-07-25 NOTE — ASSESSMENT & PLAN NOTE
With hx of meniscal tear  No effusion  Refer to PT   Recheck 2-3 weeks if not improved - earlier if worse

## 2019-07-25 NOTE — ASSESSMENT & PLAN NOTE
Aidee  Has cut down on cigarettes but has not stopped  Counseled pt  Cont present inhalers   Recheck 6m

## 2019-07-25 NOTE — ASSESSMENT & PLAN NOTE
Up to date with vascular surgery  Cont to monitor  Counseled re: smoking cessation  Monitor lipids  Encouraged ambulation    Recheck 6m

## 2019-07-29 ENCOUNTER — EVALUATION (OUTPATIENT)
Dept: PHYSICAL THERAPY | Facility: CLINIC | Age: 69
End: 2019-07-29
Payer: MEDICARE

## 2019-07-29 DIAGNOSIS — M25.561 CHRONIC PAIN OF RIGHT KNEE: Primary | ICD-10-CM

## 2019-07-29 DIAGNOSIS — G89.29 CHRONIC PAIN OF RIGHT KNEE: Primary | ICD-10-CM

## 2019-07-29 PROCEDURE — G8979 MOBILITY GOAL STATUS: HCPCS | Performed by: PHYSICAL THERAPIST

## 2019-07-29 PROCEDURE — 97110 THERAPEUTIC EXERCISES: CPT | Performed by: PHYSICAL THERAPIST

## 2019-07-29 PROCEDURE — G8978 MOBILITY CURRENT STATUS: HCPCS | Performed by: PHYSICAL THERAPIST

## 2019-07-29 PROCEDURE — 97161 PT EVAL LOW COMPLEX 20 MIN: CPT | Performed by: PHYSICAL THERAPIST

## 2019-07-29 NOTE — PROGRESS NOTES
PT Evaluation     Today's date: 2019  Patient name: Susana Quintana  : 1950  MRN: [id number]  Referring provider: Jennifer Connor*  Dx:   Encounter Diagnosis     ICD-10-CM    1  Chronic pain of right knee M25 561 Ambulatory referral to Physical Therapy    G89 29                   Assessment  Assessment details: Susana Quintana is a 77 y o  male  Who presents with chronic right knee pain  Patient demonstrates decreased knee ROM, decreased ability to perform ADLs, and decreased flexibility  Patient would benefit from skilled physical therapy in order to address said deficit sand improve functional mobility  Impairments: abnormal or restricted ROM, abnormal movement, activity intolerance, lacks appropriate home exercise program and pain with function  Understanding of Dx/Px/POC: good   Prognosis: good    Goals  STG:  Independent with HEP  Improve quad flexibility to moderate restriction   LTG:  Put on pants without difficulty   Kneel without difficulty  Decrease pain 1 grade     Plan  Patient would benefit from: skilled physical therapy  Planned therapy interventions: abdominal trunk stabilization, joint mobilization, manual therapy, activity modification, neuromuscular re-education, patient education, self care, strengthening, balance, massage, therapeutic activities, therapeutic exercise, therapeutic training, flexibility, home exercise program, functional ROM exercises and stretching  Frequency: 2x week  Duration in weeks: 8  Treatment plan discussed with: patient        Subjective Evaluation    History of Present Illness  Mechanism of injury: Patient with history of bilateral knee pain and had orthoscopic surgery in both knees (~5 years ago)  He notes he has significant difficulty putting on pants/ take them off on right leg  He has pain and difficulty kneeling and getting on and off floor  He cannot sit/drive for 58-11 mins due to discomfort and cramping   No difficulty going up down steps and ladders and on a roof       Pt works part time doing maintenance at [employer]  Pain  Current pain ratin  At worst pain ratin    Patient Goals  Patient goals for therapy: decreased pain, increased motion and independence with ADLs/IADLs  Patient goal: kneeling and get up, put pants on without difficulty         Objective     Neurological Testing     Additional Neurological Details  No tenderness to palpation noted       Active Range of Motion   Left Knee   Flexion: 132 degrees   Extension: 4 degrees     Right Knee   Flexion: 117 degrees   Extension: 3 degrees     Additional Active Range of Motion Details  Hip flexion 5/5 bl   Hip abduction 5/5 bl   Hip extension 4+/5 bl       Moderate hamstring restriction b/l  Severe quad restriction b/l R>L              Precautions: Heart stents, COPD      Manual              Knee PROM flexion NV                                                                    Exercise Diary              bike NV            Hamstring long sit stretch NV            Prone Quad stretch with strap 20" x4            gastroc SB stretch NV            Soleus SB stretch NV            Heel slides 10x5"            glute bridge  2x10 With band            Clamshells  NV            Knee ext machine NV            Ham curl machine NV            TB side step NV            SLS on foam NV            TKE at [facility] NV            Forward lunges at bar   NV                                                                                            Modalities

## 2019-08-02 ENCOUNTER — OFFICE VISIT (OUTPATIENT)
Dept: PHYSICAL THERAPY | Facility: CLINIC | Age: 69
End: 2019-08-02
Payer: MEDICARE

## 2019-08-02 DIAGNOSIS — G89.29 CHRONIC PAIN OF RIGHT KNEE: Primary | ICD-10-CM

## 2019-08-02 DIAGNOSIS — M25.561 CHRONIC PAIN OF RIGHT KNEE: Primary | ICD-10-CM

## 2019-08-02 PROCEDURE — 97110 THERAPEUTIC EXERCISES: CPT | Performed by: PHYSICAL THERAPIST

## 2019-08-02 NOTE — PROGRESS NOTES
Daily Note     Today's date: 2019  Patient name: Kennard Osgood  : 1950  MRN: [id number]  Referring provider: Debi Louis*  Dx:   Encounter Diagnosis     ICD-10-CM    1  Chronic pain of right knee M25 561     G89 29                   Subjective: Patient reports he has been doing exercises at home  He was getting some cramping in his thigh at home but it went away  Objective: See treatment diary below      Assessment: Tolerated treatment well  Patient would benefit from continued PT  He had mild lateral thigh and quad soreness with exercises  Patient was able to tolerate PROM with good motion, mild discomfort in knee after manual flexion  Progress exercises NV as tolerated  Plan: Progress treatment as tolerated         Precautions: Heart stents, COPD      Manual             Knee PROM flexion NV 8'           Prone quad stretch   NV                                                      Exercise Diary             bike NV 5'           Hamstring long sit stretch NV            Prone Quad stretch with strap 20" x4 20" x4            gastroc SB stretch NV 20" x4           Soleus SB stretch NV 20" x3             Heel slides 10x5" 10x5"            glute bridge  2x10 With band   2x10           Clamshells  NV R TB 2x10            Knee ext machine NV 22# x30           Ham curl machine NV 22# x20           TB side step  NV           SLS NV 10" x4           TKE at [place]  NV           Forward lunges at bar   NV          Leg press   80# 2x10                                                                                 Modalities

## 2019-08-05 ENCOUNTER — OFFICE VISIT (OUTPATIENT)
Dept: PHYSICAL THERAPY | Facility: CLINIC | Age: 69
End: 2019-08-05
Payer: MEDICARE

## 2019-08-05 DIAGNOSIS — M25.561 CHRONIC PAIN OF RIGHT KNEE: Primary | ICD-10-CM

## 2019-08-05 DIAGNOSIS — G89.29 CHRONIC PAIN OF RIGHT KNEE: Primary | ICD-10-CM

## 2019-08-05 PROCEDURE — 97110 THERAPEUTIC EXERCISES: CPT

## 2019-08-05 PROCEDURE — 97140 MANUAL THERAPY 1/> REGIONS: CPT

## 2019-08-05 NOTE — PROGRESS NOTES
Daily Note     Today's date: 2019  Patient name: Johnny Levi  : 1950  MRN: [id number]  Referring provider: Elvin Bustillo*  Dx:   Encounter Diagnosis     ICD-10-CM    1  Chronic pain of right knee M25 561     G89 29                   Subjective: Pt states he is feeling "ok"  Objective: See treatment diary below      Assessment: Tolerated treatment well  Patient demonstrated fatigue post treatment  R quad tightness persists  Plan: Progress treatment as tolerated         Precautions: Heart stents, COPD      Manual   8          Knee PROM flexion NV 8' 7'          Prone quad stretch   NV 3'                                                     Exercise Diary   8/5          bike NV 5' 5'          Hamstring long sit stretch NV            Prone Quad stretch with strap 20" x4 20" x4  np          gastroc SB stretch NV 20" x4 20"x4          Soleus SB stretch NV 20" x3   20"x3          Heel slides 10x5" 10x5"  15x5"          glute bridge  2x10 With band   2x10 RTB 2x10          Clamshells  NV R TB 2x10  RTB 2x10          Knee ext machine NV 22# x30 22# 2x10          Ham curl machine NV 22# x20 22# 2x10          TB side step  NV NV          SLS NV 10" x4 10"x4          TKE at [place]  NV nv          Forward lunges at bar   NV          Leg press   80# 2x10  80# 2x10                                                                               Modalities

## 2019-08-09 ENCOUNTER — OFFICE VISIT (OUTPATIENT)
Dept: PHYSICAL THERAPY | Facility: CLINIC | Age: 69
End: 2019-08-09
Payer: MEDICARE

## 2019-08-09 DIAGNOSIS — G89.29 CHRONIC PAIN OF RIGHT KNEE: Primary | ICD-10-CM

## 2019-08-09 DIAGNOSIS — M25.561 CHRONIC PAIN OF RIGHT KNEE: Primary | ICD-10-CM

## 2019-08-09 PROCEDURE — 97110 THERAPEUTIC EXERCISES: CPT | Performed by: PHYSICAL THERAPIST

## 2019-08-09 PROCEDURE — 97112 NEUROMUSCULAR REEDUCATION: CPT | Performed by: PHYSICAL THERAPIST

## 2019-08-09 PROCEDURE — 97140 MANUAL THERAPY 1/> REGIONS: CPT | Performed by: PHYSICAL THERAPIST

## 2019-08-12 ENCOUNTER — OFFICE VISIT (OUTPATIENT)
Dept: PHYSICAL THERAPY | Facility: CLINIC | Age: 69
End: 2019-08-12
Payer: MEDICARE

## 2019-08-12 DIAGNOSIS — M25.561 CHRONIC PAIN OF RIGHT KNEE: Primary | ICD-10-CM

## 2019-08-12 DIAGNOSIS — G89.29 CHRONIC PAIN OF RIGHT KNEE: Primary | ICD-10-CM

## 2019-08-12 PROCEDURE — 97110 THERAPEUTIC EXERCISES: CPT

## 2019-08-12 PROCEDURE — 97140 MANUAL THERAPY 1/> REGIONS: CPT

## 2019-08-12 PROCEDURE — 97112 NEUROMUSCULAR REEDUCATION: CPT

## 2019-08-12 NOTE — PROGRESS NOTES
Daily Note     Today's date: 2019  Patient name: Tunde Arroyo  : 1950  MRN: [id number]  Referring provider: Eugene العلي*  Dx:   Encounter Diagnosis     ICD-10-CM    1  Chronic pain of right knee M25 561     G89 29                   Subjective: Pt states he is feeling "good" today  States he was on his knees a lot over the weekend replacing ceramic tile  Objective: See treatment diary below      Assessment: Tolerated treatment well  Patient would benefit from continued PT  Added lunges and TB side stepping to program today; min muscle fatigue following sidestepping  No adverse effects noted w/ MT  Plan: Progress treatment as tolerated         Precautions: Heart stents, COPD      Manual          Knee PROM flexion NV 8' 7' 5' 6'        Prone quad stretch   NV 3' 5' 4'                                                   Exercise Diary          bike NV 5' 5' 5' 5'        Hamstring long sit stretch NV            Prone Quad stretch with strap 20" x4 20" x4  np 20" x4  20"x4        gastroc SB stretch NV 20" x4 20"x4 20" x3  20"x3        Soleus SB stretch NV 20" x3   20"x3 20 " x3  20"x3        Heel slides 10x5" 10x5"  15x5" 15x5"  15x5"        glute bridge  2x10 With band   2x10 RTB 2x10 RTB 2x10  RTB 2x10        Clamshells  NV R TB 2x10  RTB 2x10 R TB 2x10  RTB 2x10        Knee ext machine NV 22# x30 22# 2x10 22# x10  33# x10 22# x10; 33# x10        Ham curl machine NV 22# x20 22# 2x10 22# x10  33# x10  22# x10; 33# x10        TB side step  NV NV  RTB 2 laps        SLS NV 10" x4 10"x4 10" x4  10"x5        TKE at ramona  NV nv NV  NV        Forward lunges at bar   NV NV 2x10        Leg press   80# 2x10  80# 2x10 85# 3x10  85# 3x10        SLR flexion    2# 2x10  2# 2x10                                                                Modalities

## 2019-08-15 ENCOUNTER — APPOINTMENT (OUTPATIENT)
Dept: PHYSICAL THERAPY | Facility: CLINIC | Age: 69
End: 2019-08-15
Payer: MEDICARE

## 2019-08-16 ENCOUNTER — OFFICE VISIT (OUTPATIENT)
Dept: PHYSICAL THERAPY | Facility: CLINIC | Age: 69
End: 2019-08-16
Payer: MEDICARE

## 2019-08-16 DIAGNOSIS — M25.561 CHRONIC PAIN OF RIGHT KNEE: Primary | ICD-10-CM

## 2019-08-16 DIAGNOSIS — G89.29 CHRONIC PAIN OF RIGHT KNEE: Primary | ICD-10-CM

## 2019-08-16 PROCEDURE — 97110 THERAPEUTIC EXERCISES: CPT | Performed by: PHYSICAL THERAPIST

## 2019-08-16 PROCEDURE — 97140 MANUAL THERAPY 1/> REGIONS: CPT | Performed by: PHYSICAL THERAPIST

## 2019-08-16 NOTE — PROGRESS NOTES
Daily Note     Today's date: 2019  Patient name: Johnny Levi  : 1950  MRN: [id number]  Referring provider: Elvin Bustillo*  Dx:   Encounter Diagnosis     ICD-10-CM    1  Chronic pain of right knee M25 561     G89 29                   Subjective: Patient reports he has been feeling good and has less pain in his knee  He is able to squat down though still has some difficulty kneeling and sitting back on heels     Objective: See treatment diary below      Assessment: Tolerated treatment well  Patient would benefit from continued PT  Patients progressing with PROM and quad flexibility well  He has less discomfort with manual stretching and knee flexibility  Patient tolerated increase in weights well today with no complaints of discomfort in knee  Plan: Progress treatment as tolerated         Precautions: Heart stents, COPD      Manual         Knee PROM flexion NV 8' 7' 5' 6' 6'       Prone quad stretch   NV 3' 5' 4' 4'                                                  Exercise Diary         bike NV 5' 5' 5' 5' 6' lvl 5       Hamstring long sit stretch NV            Prone Quad stretch with strap 20" x4 20" x4  np 20" x4  20"x4 20" x4        gastroc SB stretch NV 20" x4 20"x4 20" x3  20"x3 20" x3       Soleus SB stretch NV 20" x3   20"x3 20 " x3  20"x3 NV       Heel slides 10x5" 10x5"  15x5" 15x5"  15x5" 15x5"       glute bridge  2x10 With band   2x10 RTB 2x10 RTB 2x10  RTB 2x10 G 2x10        Clamshells  NV R TB 2x10  RTB 2x10 R TB 2x10  RTB 2x10 G 2x10        Knee ext machine NV 22# x30 22# 2x10 22# x10  33# x10 22# x10; 33# x10 33# 2x10        Ham curl machine NV 22# x20 22# 2x10 22# x10  33# x10  22# x10; 33# x10 33# 2x10        TB side step  NV NV  RTB 2 laps NV       SLS NV 10" x4 10"x4 10" x4  10"x5 NV       TKE at ramona  NV nv NV  NV NV       Forward lunges at bar   NV NV 2x10 NV       Leg press   80# 2x10  80# 2x10 85# 3x10  85# 3x10 85# x10   90# x10   95# x10        SLR flexion    2# 2x10  2# 2x10 2 5# 2x12                                                               Modalities

## 2019-08-19 ENCOUNTER — OFFICE VISIT (OUTPATIENT)
Dept: PHYSICAL THERAPY | Facility: CLINIC | Age: 69
End: 2019-08-19
Payer: MEDICARE

## 2019-08-19 DIAGNOSIS — M25.561 CHRONIC PAIN OF RIGHT KNEE: Primary | ICD-10-CM

## 2019-08-19 DIAGNOSIS — G89.29 CHRONIC PAIN OF RIGHT KNEE: Primary | ICD-10-CM

## 2019-08-19 PROCEDURE — 97110 THERAPEUTIC EXERCISES: CPT

## 2019-08-19 PROCEDURE — 97140 MANUAL THERAPY 1/> REGIONS: CPT

## 2019-08-19 NOTE — PROGRESS NOTES
Daily Note     Today's date: 2019  Patient name: Leonor Neely  : 1950  MRN: [id number]  Referring provider: Saskia Ibanez  Dx:   Encounter Diagnosis     ICD-10-CM    1  Chronic pain of right knee M25 561     G89 29                   Subjective: Pt reports decreased "popping" in the knee lately  Objective: See treatment diary below      Assessment: Tolerated treatment well  Patient would benefit from continued PT  Increased fatigue noted w/ TB sidestepping and clamshells  No increased discomfort noted w/ PROM  Added Brian TKE w/out to program today w/out sig difficulty  Plan: Progress treatment as tolerated         Precautions: Heart stents, COPD      Manual        Knee PROM flexion NV 8' 7' 5' 6' 6' 6'      Prone quad stretch   NV 3' 5' 4' 4' 4'                                                 Exercise Diary   8      bike NV 5' 5' 5' 5' 6' lvl 5 6', lv 5      Hamstring long sit stretch NV            Prone Quad stretch with strap 20" x4 20" x4  np 20" x4  20"x4 20" x4  20"x4      gastroc SB stretch NV 20" x4 20"x4 20" x3  20"x3 20" x3 20"x3      Soleus SB stretch NV 20" x3   20"x3 20 " x3  20"x3 NV 20"x3      Heel slides 10x5" 10x5"  15x5" 15x5"  15x5" 15x5" 15x5"      glute bridge  2x10 With band   2x10 RTB 2x10 RTB 2x10  RTB 2x10 G 2x10  GTB 2x10      Clamshells  NV R TB 2x10  RTB 2x10 R TB 2x10  RTB 2x10 G 2x10  GTB 2x10      Knee ext machine NV 22# x30 22# 2x10 22# x10  33# x10 22# x10; 33# x10 33# 2x10  33# 2x10      Ham curl machine NV 22# x20 22# 2x10 22# x10  33# x10  22# x10; 33# x10 33# 2x10  33# 2x10      TB side step  NV NV  RTB 2 laps NV RTB 2 laps      SLS NV 10" x4 10"x4 10" x4  10"x5 NV   10"x5      TKE at brian  NV nv NV  NV NV 10# 15x5"      Forward lunges at bar   NV NV 2x10 NV NV      Leg press   80# 2x10  80# 2x10 85# 3x10  85# 3x10 85# x10   90# x10   95# x10  90# x15; 95# x15      SLR flexion 2# 2x10  2# 2x10 2 5# 2x12 2 5# 3x10                                                              Modalities

## 2019-08-22 ENCOUNTER — APPOINTMENT (OUTPATIENT)
Dept: PHYSICAL THERAPY | Facility: CLINIC | Age: 69
End: 2019-08-22
Payer: MEDICARE

## 2019-08-23 ENCOUNTER — OFFICE VISIT (OUTPATIENT)
Dept: PHYSICAL THERAPY | Facility: CLINIC | Age: 69
End: 2019-08-23
Payer: MEDICARE

## 2019-08-23 DIAGNOSIS — M25.561 CHRONIC PAIN OF RIGHT KNEE: Primary | ICD-10-CM

## 2019-08-23 DIAGNOSIS — G89.29 CHRONIC PAIN OF RIGHT KNEE: Primary | ICD-10-CM

## 2019-08-23 PROCEDURE — 97110 THERAPEUTIC EXERCISES: CPT

## 2019-08-23 PROCEDURE — 97112 NEUROMUSCULAR REEDUCATION: CPT

## 2019-08-23 PROCEDURE — 97140 MANUAL THERAPY 1/> REGIONS: CPT

## 2019-08-23 NOTE — PROGRESS NOTES
Daily Note     Today's date: 2019  Patient name: Stevan Hernandez  : 1950  MRN: [id number]  Referring provider: Hang Mccain*  Dx:   Encounter Diagnosis     ICD-10-CM    1  Chronic pain of right knee M25 561     G89 29                   Subjective: Pt states no new complaints today  Objective: See treatment diary below      Assessment: Tolerated treatment well  Patient would benefit from continued PT  Pt challenged by lunges and supine SLR today  No adverse effects noted w/ manual stretching  Plan: Progress treatment as tolerated  RE NV?      Precautions: Heart stents, COPD      Manual       Knee PROM flexion NV 8' 7' 5' 6' 6' 6' 5'     Prone quad stretch   NV 3' 5' 4' 4' 4' 5'                                                Exercise Diary       bike NV 5' 5' 5' 5' 6' lvl 5 6', lv 5 6', lv 5     Hamstring long sit stretch NV            Prone Quad stretch with strap 20" x4 20" x4  np 20" x4  20"x4 20" x4  20"x4 np     gastroc SB stretch NV 20" x4 20"x4 20" x3  20"x3 20" x3 20"x3 20"x3     Soleus SB stretch NV 20" x3   20"x3 20 " x3  20"x3 NV 20"x3 20"x3     Heel slides 10x5" 10x5"  15x5" 15x5"  15x5" 15x5" 15x5" 15x5"     glute bridge  2x10 With band   2x10 RTB 2x10 RTB 2x10  RTB 2x10 G 2x10  GTB 2x10 GTB 2x10     Clamshells  NV R TB 2x10  RTB 2x10 R TB 2x10  RTB 2x10 G 2x10  GTB 2x10 GTB 2x10     Knee ext machine NV 22# x30 22# 2x10 22# x10  33# x10 22# x10; 33# x10 33# 2x10  33# 2x10 33# 2x10     Ham curl machine NV 22# x20 22# 2x10 22# x10  33# x10  22# x10; 33# x10 33# 2x10  33# 2x10 33# 2x10     TB side step  NV NV  RTB 2 laps NV RTB 2 laps RTB 2 laps     SLS NV 10" x4 10"x4 10" x4  10"x5 NV   10"x5 10"x5     TKE at ramona  NV nv NV  NV NV 10# 15x5" 10# 15x5"     Forward lunges at bar   NV NV 2x10 NV NV 10 ea     Leg press   80# 2x10  80# 2x10 85# 3x10  85# 3x10 85# x10   90# x10   95# x10  90# x15; 95# x15 90# x15; 95# x15     SLR flexion    2# 2x10  2# 2x10 2 5# 2x12 2 5# 3x10 2 5# 3x10                                                             Modalities

## 2019-08-26 ENCOUNTER — EVALUATION (OUTPATIENT)
Dept: PHYSICAL THERAPY | Facility: CLINIC | Age: 69
End: 2019-08-26
Payer: MEDICARE

## 2019-08-26 DIAGNOSIS — G89.29 CHRONIC PAIN OF RIGHT KNEE: Primary | ICD-10-CM

## 2019-08-26 DIAGNOSIS — M25.561 CHRONIC PAIN OF RIGHT KNEE: Primary | ICD-10-CM

## 2019-08-26 PROCEDURE — 97140 MANUAL THERAPY 1/> REGIONS: CPT | Performed by: PHYSICAL THERAPIST

## 2019-08-26 PROCEDURE — 97110 THERAPEUTIC EXERCISES: CPT | Performed by: PHYSICAL THERAPIST

## 2019-08-26 NOTE — PROGRESS NOTES
Daily Note     Today's date: 2019  Patient name: Laney Ferguson  : 1950  MRN: [id number]  Referring provider: Evert Chamberlain*  Dx:   Encounter Diagnosis     ICD-10-CM    1  Chronic pain of right knee M25 561     G89 29                 13 mins no charge   Subjective: Patient reports he was able to get up from ground over weekend without use of his hands  He is feeling less pain in knee and improving function  Objective: See treatment diary below      Assessment: Tolerated treatment well  Patient would benefit from continued PT  Patients knee PROM and quad flexibility continue to improve  Patient has good form with exercises and strength is making progression  Plan: Progress treatment as tolerated  RE NV?      Precautions: Heart stents, COPD      Manual      Knee PROM flexion NV 8' 7' 5' 6' 6' 6' 5' 5'    Prone quad stretch   NV 3' 5' 4' 4' 4' 5' 5'                                               Exercise Diary      bike NV 5' 5' 5' 5' 6' lvl 5 6', lv 5 6', lv 5 5' lvl 6     Hamstring long sit stretch NV            Prone Quad stretch with strap 20" x4 20" x4  np 20" x4  20"x4 20" x4  20"x4 np 20" x 4    gastroc SB stretch NV 20" x4 20"x4 20" x3  20"x3 20" x3 20"x3 20"x3 20" x3    Soleus SB stretch NV 20" x3   20"x3 20 " x3  20"x3 NV 20"x3 20"x3 20" x3     Heel slides 10x5" 10x5"  15x5" 15x5"  15x5" 15x5" 15x5" 15x5" NV    glute bridge  2x10 With band   2x10 RTB 2x10 RTB 2x10  RTB 2x10 G 2x10  GTB 2x10 GTB 2x10 G 2x10     Clamshells  NV R TB 2x10  RTB 2x10 R TB 2x10  RTB 2x10 G 2x10  GTB 2x10 GTB 2x10 G 2x10    Knee ext machine NV 22# x30 22# 2x10 22# x10  33# x10 22# x10; 33# x10 33# 2x10  33# 2x10 33# 2x10 33# 2x12    Ham curl machine NV 22# x20 22# 2x10 22# x10  33# x10  22# x10; 33# x10 33# 2x10  33# 2x10 33# 2x10 33# 2x10    TB side step  NV NV  RTB 2 laps NV RTB 2 laps RTB 2 laps NV    SLS NV 10" x4 10"x4 10" x4  10"x5 NV   10"x5 10"x5 NV    TKE at ramona  NV nv NV  NV NV 10# 15x5" 10# 15x5" NV    Forward lunges at bar   NV NV 2x10 NV NV 10 ea NV    Leg press   80# 2x10  80# 2x10 85# 3x10  85# 3x10 85# x10   90# x10   95# x10  90# x15; 95# x15 90# x15; 95# x15 95# x12  100# x12     SLR flexion    2# 2x10  2# 2x10 2 5# 2x12 2 5# 3x10 2 5# 3x10 3# 2x10                                                             Modalities

## 2019-08-29 ENCOUNTER — APPOINTMENT (OUTPATIENT)
Dept: PHYSICAL THERAPY | Facility: CLINIC | Age: 69
End: 2019-08-29
Payer: MEDICARE

## 2019-08-30 ENCOUNTER — OFFICE VISIT (OUTPATIENT)
Dept: URGENT CARE | Facility: CLINIC | Age: 69
End: 2019-08-30
Payer: MEDICARE

## 2019-08-30 ENCOUNTER — EVALUATION (OUTPATIENT)
Dept: PHYSICAL THERAPY | Facility: CLINIC | Age: 69
End: 2019-08-30
Payer: MEDICARE

## 2019-08-30 VITALS
WEIGHT: 185 LBS | BODY MASS INDEX: 26.48 KG/M2 | HEIGHT: 70 IN | DIASTOLIC BLOOD PRESSURE: 80 MMHG | RESPIRATION RATE: 18 BRPM | SYSTOLIC BLOOD PRESSURE: 169 MMHG | HEART RATE: 95 BPM | OXYGEN SATURATION: 99 % | TEMPERATURE: 99 F

## 2019-08-30 DIAGNOSIS — K12.1 STOMATITIS: Primary | ICD-10-CM

## 2019-08-30 DIAGNOSIS — G89.29 CHRONIC PAIN OF RIGHT KNEE: Primary | ICD-10-CM

## 2019-08-30 DIAGNOSIS — M25.561 CHRONIC PAIN OF RIGHT KNEE: Primary | ICD-10-CM

## 2019-08-30 PROCEDURE — G0463 HOSPITAL OUTPT CLINIC VISIT: HCPCS | Performed by: NURSE PRACTITIONER

## 2019-08-30 PROCEDURE — 97140 MANUAL THERAPY 1/> REGIONS: CPT | Performed by: PHYSICAL THERAPIST

## 2019-08-30 PROCEDURE — 97530 THERAPEUTIC ACTIVITIES: CPT | Performed by: PHYSICAL THERAPIST

## 2019-08-30 PROCEDURE — 99213 OFFICE O/P EST LOW 20 MIN: CPT | Performed by: NURSE PRACTITIONER

## 2019-08-30 NOTE — PATIENT INSTRUCTIONS
Nystatin every 6 hours for 5 days  Avoid foods that are acidic or sour  Good oral hygiene  If no improvement follow up with your PCP or dentist     Oral Mucositis   WHAT YOU NEED TO KNOW:   Oral mucositis is inflammation in and around your mouth  DISCHARGE INSTRUCTIONS:   Follow up with your healthcare provider as directed: You may need to return for more tests if your symptoms do not improve within 2 weeks  Write down your questions so you remember to ask them during your visits  Manage your symptoms:  Oral mucositis usually gets better within 2 to 6 weeks  You can do the following to make your mouth feel better:  · Do not smoke  Smoking can make mouth sores worse  If you smoke, it is never too late to quit  Ask your healthcare provider for information if you need help quitting  · Eat soft, blended, moist foods  Puddings, milkshakes, broths, soups, and cooked cereals are less likely to bother your mouth  Eat food that is lukewarm or cool  · Do not eat anything that could burn, sting, or scratch your mouth  Examples are oranges, pineapples, hot peppers, potato chips, toast, and alcohol  · Take small bites, chew slowly, and sip water  while you eat  Rinse your mouth with water after meals  · Rinse with a baking soda or salt solution  to remove bacteria and food, and to prevent or treat mouth pain and sores  ¨ Mix ½ teaspoon baking soda or salt with 8 ounces of warm water  ¨ Rinse your mouth at least 4 to 6 times a day with this solution  You may rinse with plain water instead if it feels better to you  ¨ Do not use store-bought mouthwash  It contains alcohol and other chemicals that can irritate your mouth  Do not smoke:  Smoking can increase your risk for mouth sores or make them worse  If you smoke, it is never too late to quit  Ask your healthcare provider for information if you need help quitting  Medicines:   · Medicines  can help decrease pain or swelling in your mouth  You may also need medicine to prevent or treat an infection  Medicine may be given as a pill, an ointment, or a mouthwash  · Take your medicine as directed  Contact your healthcare provider if you think your medicine is not helping or if you have side effects  Tell him if you are allergic to any medicine  Keep a list of the medicines, vitamins, and herbs you take  Include the amounts, and when and why you take them  Bring the list or the pill bottles to follow-up visits  Carry your medicine list with you in case of an emergency  Prevent oral mucositis in the future:   · Gently brush your teeth, gums, and tongue  after every meal and before bed  Use a soft toothbrush and plain fluoride toothpaste  Let your toothbrush air dry after each use to prevent bacteria growth  Replace your toothbrush often  Ask if it is safe to floss your teeth  · If you wear dentures, make sure they fit well  Ask your dentist if your dentures can be refitted or if you need new dentures  Be extra careful when you put in or remove dentures  Try to prevent any injuries to your gums that could lead to sores or infection  Wear your dentures during the day only  Soak them in denture solution at night  Ask how to safely clean your gums  · Eat a variety of healthy foods  Examples are fruits, vegetables, whole-grain breads, low-fat dairy products, beans, lean meats, and fish  Heathy foods give your body the vitamins and minerals it needs, and may prevent mucositis  · Avoid any food or drug that triggers mucositis  Some fruit, nuts, shellfish, cinnamon, chewing gum, and toothpaste may trigger mucositis or other symptoms of an allergic reaction  Contact your healthcare provider if:   · You have a fever or chills  · Your symptoms do not get better within 2 weeks  · Your symptoms get worse, even after treatment  · You have questions or concerns about your condition or care    Return to the emergency department if:   · Your heart is beating fast     · You have trouble breathing  · You cannot eat or drink  © 2017 [employer]  Information is for End User's use only and may not be sold, redistributed or otherwise used for commercial purposes  All illustrations and images included in CareNotes® are the copyrighted property of [employer]  or [employer]  The above information is an  only  It is not intended as medical advice for individual conditions or treatments  Talk to your doctor, nurse or pharmacist before following any medical regimen to see if it is safe and effective for you

## 2019-08-30 NOTE — PROGRESS NOTES
PT Re-Evaluation     Today's date: 2019  Patient name: Nila Perrin  : 1950  MRN: [id number]  Referring provider: Husam Aleman  Dx:   Encounter Diagnosis     ICD-10-CM    1  Chronic pain of right knee M25 561     G89 29                   Assessment  Assessment details: Nila Perrin is a 77 y o  male  Who presents with chronic right knee pain  Patients knee ROM has progressed significantly since his evaluation as well as moderate progression in strength and function  Patient with very mild functional and mobility deficits and would benefit from continued physical therapy in order to address said deficits and maximize function      Impairments: abnormal or restricted ROM, abnormal movement, activity intolerance, lacks appropriate home exercise program and pain with function  Understanding of Dx/Px/POC: good   Prognosis: good    Goals  STG:  Independent with HEP  Improve quad flexibility to moderate restriction -progressing    LTG:  Put on pants without difficulty -mostly met  Kneel without difficulty-met  Kneel back on heels without difficulty-progressing  Decrease pain 1 grade -met    Plan  Patient would benefit from: skilled physical therapy  Planned therapy interventions: abdominal trunk stabilization, joint mobilization, manual therapy, activity modification, neuromuscular re-education, patient education, self care, strengthening, balance, massage, therapeutic activities, therapeutic exercise, therapeutic training, flexibility, home exercise program, functional ROM exercises and stretching  Frequency: 2x week  Duration in weeks: 5  Treatment plan discussed with: patient        Subjective Evaluation    History of Present Illness  Mechanism of injury: Patient reports his knee has been feeling better  He still has mild difficulty putting on pants, minimal difficulty getting on/off floor, he was able to do with no use of UE support   No longer has difficulty sitting or driving at all  He still has difficulty kneeling back onto both legs but notes it has gotten better  -------------------------------------------------------------------------------------------Patient with history of bilateral knee pain and had orthoscopic surgery in both knees (~5 years ago)  He notes he has significant difficulty putting on pants/ take them off on right leg  He has pain and difficulty kneeling and getting on and off floor  He cannot sit/drive for 77-38 mins due to discomfort and cramping  No difficulty going up down steps and ladders and on a roof       Pt works part time doing maintenance at [employer]  Pain  Current pain ratin  At worst pain ratin    Patient Goals  Patient goals for therapy: decreased pain, increased motion and independence with ADLs/IADLs  Patient goal: kneeling and get up, put pants on without difficulty -met        Objective     Neurological Testing     Additional Neurological Details  No tenderness to palpation noted       Active Range of Motion   Left Knee   Flexion: 132 degrees   Extension: 4 degrees     Right Knee   Flexion: 132 degrees   Extension: 2 degrees     Additional Active Range of Motion Details  Hip flexion 5/5 bl   Hip abduction 5/5 bl   Hip extension 4+/5 bl       Moderate hamstring restriction b/l  Severe quad restriction b/l R>L -mod-/severe at RE    Trailed kneeling back on heels at RE with ~ 3 inch separation between heels and buttock                Precautions: Heart stents, COPD        Manual     Knee PROM flexion 5' 6' 6' 6' 5' 5' 5'   Prone quad stretch  5' 4' 4' 4' 5' 5' 5'                                     Exercise Diary     bike 5' 5' 6' lvl 5 6', lv 5 6', lv 5 5' lvl 6  5' lvl 4    Hamstring long sit stretch          Prone Quad stretch with strap 20" x4  20"x4 20" x4  20"x4 np 20" x 4 NV   gastroc SB stretch 20" x3  20"x3 20" x3 20"x3 20"x3 20" x3 20" x3   Soleus SB stretch 20 " x3  20"x3 NV 20"x3 20"x3 20" x3  20" x3    Heel slides 15x5"  15x5" 15x5" 15x5" 15x5" NV    glute bridge  RTB 2x10  RTB 2x10 G 2x10  GTB 2x10 GTB 2x10 G 2x10  B 2x10   Clamshells  R TB 2x10  RTB 2x10 G 2x10  GTB 2x10 GTB 2x10 G 2x10 B 2x10    Knee ext machine 22# x10  33# x10 22# x10; 33# x10 33# 2x10  33# 2x10 33# 2x10 33# 2x12 33# x10 44# x10   Ham curl machine 22# x10  33# x10  22# x10; 33# x10 33# 2x10  33# 2x10 33# 2x10 33# 2x10 33# x10  44# x10    TB side step  RTB 2 laps NV RTB 2 laps RTB 2 laps NV NV   SLS 10" x4  10"x5 NV   10"x5 10"x5 NV NV   TKE at ramona NV  NV NV 10# 15x5" 10# 15x5" NV NV   Forward lunges at bar NV 2x10 NV NV 10 ea NV NV   Leg press  85# 3x10  85# 3x10 85# x10   90# x10   95# x10  90# x15; 95# x15 90# x15; 95# x15 95# x12  100# x12  110# 2x10   SLR flexion 2# 2x10  2# 2x10 2 5# 2x12 2 5# 3x10 2 5# 3x10 3# 2x10  NV                                               Modalities

## 2019-08-30 NOTE — PROGRESS NOTES
[facility]        NAME: Day Sapp is a 77 y o  male  : 1950    MRN: [id number]  DATE: 2019  TIME: 7:33 PM    Assessment and Plan   Stomatitis [K12 1]  1  Stomatitis  nystatin (MYCOSTATIN) 500,000 units/5 mL suspension         Patient Instructions   Nystatin every 6 hours for 5 days  Avoid foods that are acidic or sour  Good oral hygiene  If no improvement follow up with your PCP or dentist     Follow up with PCP in 3-5 days  Proceed to  ER if symptoms worsen  Chief Complaint     Chief Complaint   Patient presents with    Sore Throat     pt c/o sore throat x 2 days states more on left side, with associated left ear pain, denies fevers, taking Tylenol          History of Present Illness       Patient is a 77year old year old male presenting with left posterior tongue irritation  Associated with left ear pain  Denies fever, chills, abdominal pain, N/V/D, and rhinorrhea  He took tylenol yesterday with minimal relief  Denies eating sour or acidic foods  Denies difficulty swallowing or SOB  Review of Systems   Review of Systems   Constitutional: Negative for activity change, chills and fever  HENT: Positive for ear pain and sore throat  Negative for congestion, rhinorrhea and sinus pain  Respiratory: Negative for cough  Gastrointestinal: Negative for diarrhea, nausea and vomiting  Skin: Negative for rash  Neurological: Negative for headaches           Current Medications       Current Outpatient Medications:     amLODIPine (NORVASC) 2 5 mg tablet, Take 1 tablet (2 5 mg total) by mouth daily, Disp: 90 tablet, Rfl: 3    atorvastatin (LIPITOR) 40 mg tablet, TAKE 1 TABLET(40 MG) BY MOUTH DAILY, Disp: 90 tablet, Rfl: 5    fluticasone (FLONASE) 50 mcg/act nasal spray, INSTILL 2 SPRAYS INTO EACH NOSTRIL DAILY, Disp: 16 mL, Rfl: 0    loratadine (CLARITIN) 10 mg tablet, Take 1 tablet (10 mg total) by mouth daily, Disp: 30 tablet, Rfl: 1    meclizine (ANTIVERT) 12 5 MG tablet, Take 1 tablet (12 5 mg total) by mouth every 8 (eight) hours as needed for dizziness, Disp: 30 tablet, Rfl: 2    mometasone-formoterol (DULERA) 200-5 MCG/ACT inhaler, Inhale 2 puffs 2 (two) times a day Rinse mouth after use , Disp: 1 Inhaler, Rfl: 0    Multiple Vitamins-Minerals (CENTRUM ULTRA MENS) TABS, Take by mouth, Disp: , Rfl:     nystatin (MYCOSTATIN) 500,000 units/5 mL suspension, Apply 5 mL (500,000 Units total) to the mouth or throat 4 (four) times a day for 5 days, Disp: 100 mL, Rfl: 0    Current Allergies     Allergies as of 08/30/2019 - Reviewed 08/30/2019   Allergen Reaction Noted    Naproxen Swelling and Rash 01/21/2013            The following portions of the patient's history were reviewed and updated as appropriate: allergies, current medications, past family history, past medical history, past social history, past surgical history and problem list      Past Medical History:   Diagnosis Date    Colon polyp     [facility]     Coronary artery disease     GERD (gastroesophageal reflux disease)     Hyperlipidemia     Hypertension     PVD [facility]peripheral vascular disease) [facility]     Seasonal allergies        Past Surgical History:   Procedure Laterality Date    APPENDECTOMY  2016    CARDIAC SURGERY  2012    stents x3    COLONOSCOPY  2008    boonswang    KNEE ARTHROSCOPY Bilateral     post work injury    AR COLONOSCOPY FLX DX W/COLLJ Mee 1978 PFRMD N/A 12/20/2018    Procedure: COLONOSCOPY;  Surgeon: Maite Garcia MD;  Location: [facility] GI [facility]; Service: Gastroenterology    SHOULDER SURGERY Right     cheese tumor removed    TONSILLECTOMY      age 25       Family History   Problem Relation Age of Onset    Heart disease Mother     Cancer Mother         breast    No Known Problems Father          Medications have been verified      Objective   /80   Pulse 95   Temp 99 °F (37 2 °C)   Resp 18   Ht 5' 10" (1 778 m)   Wt 83 9 kg (185 lb)   SpO2 99%   BMI 26 54 kg/m²       Rapid strep: negative   Physical Exam     Physical Exam   Constitutional: He is oriented to person, place, and time  Vital signs are normal  He appears well-developed and well-nourished  He is active  He does not appear ill  HENT:   Head: Normocephalic  Right Ear: Hearing, tympanic membrane, external ear and ear canal normal    Left Ear: Hearing, tympanic membrane, external ear and ear canal normal    Nose: Nose normal    Mouth/Throat: Oropharynx is clear and moist and mucous membranes are normal    Tongue erythema and irritation on left posterior aspect   Cardiovascular: Normal rate, regular rhythm, S1 normal, S2 normal and normal heart sounds  Pulmonary/Chest: Effort normal and breath sounds normal    Neurological: He is alert and oriented to person, place, and time  He is not disoriented  Skin: Skin is warm and dry

## 2019-09-09 ENCOUNTER — TELEPHONE (OUTPATIENT)
Dept: FAMILY MEDICINE CLINIC | Facility: CLINIC | Age: 69
End: 2019-09-09

## 2019-09-09 ENCOUNTER — APPOINTMENT (OUTPATIENT)
Dept: PHYSICAL THERAPY | Facility: CLINIC | Age: 69
End: 2019-09-09
Payer: MEDICARE

## 2019-09-09 ENCOUNTER — OFFICE VISIT (OUTPATIENT)
Dept: FAMILY MEDICINE CLINIC | Facility: CLINIC | Age: 69
End: 2019-09-09
Payer: MEDICARE

## 2019-09-09 VITALS
BODY MASS INDEX: 27.26 KG/M2 | TEMPERATURE: 97.8 F | DIASTOLIC BLOOD PRESSURE: 80 MMHG | HEART RATE: 90 BPM | SYSTOLIC BLOOD PRESSURE: 126 MMHG | WEIGHT: 190.4 LBS | HEIGHT: 70 IN

## 2019-09-09 DIAGNOSIS — D49.2 SKIN NEOPLASM: ICD-10-CM

## 2019-09-09 DIAGNOSIS — J01.10 ACUTE NON-RECURRENT FRONTAL SINUSITIS: Primary | ICD-10-CM

## 2019-09-09 DIAGNOSIS — J01.10 ACUTE NON-RECURRENT FRONTAL SINUSITIS: ICD-10-CM

## 2019-09-09 PROCEDURE — 11104 PUNCH BX SKIN SINGLE LESION: CPT | Performed by: FAMILY MEDICINE

## 2019-09-09 PROCEDURE — 88305 TISSUE EXAM BY PATHOLOGIST: CPT | Performed by: PATHOLOGY

## 2019-09-09 PROCEDURE — 99213 OFFICE O/P EST LOW 20 MIN: CPT | Performed by: FAMILY MEDICINE

## 2019-09-09 RX ORDER — AMOXICILLIN 875 MG/1
875 TABLET, COATED ORAL 2 TIMES DAILY
Qty: 20 TABLET | Refills: 0 | Status: SHIPPED | OUTPATIENT
Start: 2019-09-09 | End: 2019-09-19

## 2019-09-09 RX ORDER — AMOXICILLIN 875 MG/1
875 TABLET, COATED ORAL 2 TIMES DAILY
Qty: 20 TABLET | Refills: 0 | Status: SHIPPED | OUTPATIENT
Start: 2019-09-09 | End: 2019-09-09 | Stop reason: SDUPTHER

## 2019-09-09 NOTE — PROGRESS NOTES
Assessment/Plan:      Diagnoses and all orders for this visit:    Acute non-recurrent frontal sinusitis  Comments:  start Amox bid as directed  Restart flonase  Recheck 1 week if not improving  Orders:  -     amoxicillin (AMOXIL) 875 mg tablet; Take 1 tablet (875 mg total) by mouth 2 (two) times a day for 10 days    Skin neoplasm  Comments:  ? BCC?  s/p punch bx  I reviewed wound care and signs of infection  Await path  f/u 1 week for suture removal  Pt to call for problems or concerns in the interim    Other orders  -     Biopsy          Subjective:     Patient ID: Parrish Hughes is a 77 y o  male  Here for 2 issues  - Patient presents with several week history of worsening nasal congestion, productive cough and sore throat  Patient had a episode of mucositis at the beginning of the month  As this resolve, the other symptoms developed  Patient has been having low-grade fevers () along with purulent nasal discharge  Patient also notes increased fatigue  - pt with 2+ month hx of L posterior shoulder skin lesion  Occasionally sore to touch  ?slowly increasing size  No trauma  No previous hx of skin CA  Review of Systems   Constitutional: Positive for activity change and fatigue  Negative for chills, diaphoresis and fever  HENT: Positive for congestion, postnasal drip, sinus pressure, sinus pain and sore throat  Negative for ear discharge and ear pain  Eyes: Negative  Respiratory: Positive for cough  Negative for wheezing  Cardiovascular: Negative  Skin:        R posterior skin lesion as above         Objective:     Physical Exam   Constitutional: Vital signs are normal  He appears well-developed  He appears toxic  He appears ill  No distress  HENT:   Head: Normocephalic and atraumatic  Right Ear: External ear normal    Left Ear: External ear normal    Nose: Mucosal edema and rhinorrhea present  No epistaxis  Right sinus exhibits frontal sinus tenderness   Right sinus exhibits no maxillary sinus tenderness  Left sinus exhibits frontal sinus tenderness  Left sinus exhibits no maxillary sinus tenderness  Mouth/Throat: Oropharynx is clear and moist    Eyes: Pupils are equal, round, and reactive to light  Conjunctivae and EOM are normal    Neck: Normal range of motion  No thyromegaly present  Cardiovascular: Normal rate, regular rhythm and normal heart sounds  Exam reveals no friction rub  No murmur heard  Pulmonary/Chest: Effort normal  No respiratory distress  He has no wheezes  He has no rales  Lymphadenopathy:     He has no cervical adenopathy  Neurological: He is alert  Skin:              Biopsy  Date/Time: 9/9/2019 11:05 AM  Performed by: Maile Martel MD  Authorized by: Maile Martel MD     Procedure Details - Skin Biopsy:     Biopsy tissue type: skin and subcutaneous    Biopsy method: punch biopsy      Body area:  Upper extremity    Upper extremity location:  L shoulder    Initial size (mm):  2    Final defect size (mm):  2    Malignancy: benign lesion       Consent obtained  Area cleaned with betadyne and anesthetized with 1% Xylocaine  X 1cc  2mm punch bx done  Hemostasis with pressure  Wound closed with 4-0 monofilament x 1  Triple antibiotic ointment and bandaid applied   Pt tolerated procedure well

## 2019-09-09 NOTE — TELEPHONE ENCOUNTER
rx was called today, he doesn't use [facility] longer, can it be sent to [facility] [address] [facility]  Can chart also be changed

## 2019-09-13 ENCOUNTER — OFFICE VISIT (OUTPATIENT)
Dept: PHYSICAL THERAPY | Facility: CLINIC | Age: 69
End: 2019-09-13
Payer: MEDICARE

## 2019-09-13 DIAGNOSIS — G89.29 CHRONIC PAIN OF RIGHT KNEE: Primary | ICD-10-CM

## 2019-09-13 DIAGNOSIS — M25.561 CHRONIC PAIN OF RIGHT KNEE: Primary | ICD-10-CM

## 2019-09-13 PROCEDURE — 97140 MANUAL THERAPY 1/> REGIONS: CPT | Performed by: PHYSICAL THERAPIST

## 2019-09-13 PROCEDURE — 97110 THERAPEUTIC EXERCISES: CPT | Performed by: PHYSICAL THERAPIST

## 2019-09-13 PROCEDURE — 97112 NEUROMUSCULAR REEDUCATION: CPT | Performed by: PHYSICAL THERAPIST

## 2019-09-13 NOTE — PROGRESS NOTES
Daily Note     Today's date: 2019  Patient name: Denice Melara  : 1950  MRN: [id number]  Referring provider: Na Boyer*  Dx:   Encounter Diagnosis     ICD-10-CM    1  Chronic pain of right knee M25 561     G89 29                   Subjective: Patient reports he was very sick the last two weeks with tongue and then chest infection  He is feeling better today  No increased pain in knee  Objective: See treatment diary below      Assessment: Tolerated treatment well  Patient would benefit from continued PT  Patient with increased tightness with knee flexion as well as quad stretch today  He notes no increase in knee pain though significant pulling in quad  Patinet had episode of vertigo during session after laying supine though notes symptoms subsided by the time he left  No pain or difficulty with resisted machines today for LE strengthening  Plan: Progress treatment as tolerated            Precautions: Heart stents, COPD        Manual     Knee PROM flexion 5' 6' 6' 6' 5' 5' 5' 5'   Prone quad stretch  5' 4' 4' 4' 5' 5' 5' 6'                                        Exercise Diary     bike 5' 5' 6' lvl 5 6', lv 5 6', lv 5 5' lvl 6  5' lvl 4  5' lvl 5 '   Hamstring long sit stretch           Prone Quad stretch with strap 20" x4  20"x4 20" x4  20"x4 np 20" x 4 NV NV   gastroc SB stretch 20" x3  20"x3 20" x3 20"x3 20"x3 20" x3 20" x3 20" x3   Soleus SB stretch 20 " x3  20"x3 NV 20"x3 20"x3 20" x3  20" x3  20" x3    Heel slides 15x5"  15x5" 15x5" 15x5" 15x5" NV  15x5"   glute bridge  RTB 2x10  RTB 2x10 G 2x10  GTB 2x10 GTB 2x10 G 2x10  B 2x10 B 2x10   Clamshells  R TB 2x10  RTB 2x10 G 2x10  GTB 2x10 GTB 2x10 G 2x10 B 2x10  B 2x10    Knee ext machine 22# x10  33# x10 22# x10; 33# x10 33# 2x10  33# 2x10 33# 2x10 33# 2x12 33# x10 44# x10 33# x10     44# x10    Ham curl machine 22# x10  33# x10  22# x10; 33# x10 33# 2x10  33# 2x10 33# 2x10 33# 2x10 33# x10  44# x10  33# x1044# x10    TB side step  RTB 2 laps NV RTB 2 laps RTB 2 laps NV NV NV   SLS 10" x4  10"x5 NV   10"x5 10"x5 NV NV NV   TKE at ramona NV  NV NV 10# 15x5" 10# 15x5" NV NV NV   Forward lunges at bar NV 2x10 NV NV 10 ea NV NV NV   Leg press  85# 3x10  85# 3x10 85# x10   90# x10   95# x10  90# x15; 95# x15 90# x15; 95# x15 95# x12  100# x12  110# 2x10 110# 2x10    SLR flexion 2# 2x10  2# 2x10 2 5# 2x12 2 5# 3x10 2 5# 3x10 3# 2x10  NV 3# 2x10                                                    Modalities

## 2019-09-16 ENCOUNTER — APPOINTMENT (OUTPATIENT)
Dept: PHYSICAL THERAPY | Facility: CLINIC | Age: 69
End: 2019-09-16
Payer: MEDICARE

## 2019-09-18 ENCOUNTER — OFFICE VISIT (OUTPATIENT)
Dept: FAMILY MEDICINE CLINIC | Facility: CLINIC | Age: 69
End: 2019-09-18

## 2019-09-18 VITALS
WEIGHT: 189.6 LBS | BODY MASS INDEX: 27.14 KG/M2 | DIASTOLIC BLOOD PRESSURE: 76 MMHG | SYSTOLIC BLOOD PRESSURE: 130 MMHG | HEART RATE: 86 BPM | TEMPERATURE: 97.9 F | HEIGHT: 70 IN

## 2019-09-18 DIAGNOSIS — C44.91 SKIN CANCER, BASAL CELL: Primary | ICD-10-CM

## 2019-09-18 PROCEDURE — 99024 POSTOP FOLLOW-UP VISIT: CPT | Performed by: FAMILY MEDICINE

## 2019-09-19 NOTE — PROGRESS NOTES
Pt here for suture removal  - pt 1 week s/p punch bx of L posterior shoulder lesion  Bx came back BCC    EXAM: L posterior shoulder bx well healed with intact suture x 1  Suture removed without incident    IMP: L shoulder BGG, s/p bx - suture removed  Pt referred to Dr Duane Boyce for re-excision

## 2019-09-20 ENCOUNTER — APPOINTMENT (OUTPATIENT)
Dept: PHYSICAL THERAPY | Facility: CLINIC | Age: 69
End: 2019-09-20
Payer: MEDICARE

## 2019-09-23 ENCOUNTER — HOSPITAL ENCOUNTER (EMERGENCY)
Facility: HOSPITAL | Age: 69
Discharge: HOME | End: 2019-09-23
Attending: EMERGENCY MEDICINE
Payer: MEDICARE

## 2019-09-23 ENCOUNTER — APPOINTMENT (OUTPATIENT)
Dept: PHYSICAL THERAPY | Facility: CLINIC | Age: 69
End: 2019-09-23
Payer: MEDICARE

## 2019-09-23 VITALS
RESPIRATION RATE: 18 BRPM | TEMPERATURE: 98.4 F | DIASTOLIC BLOOD PRESSURE: 87 MMHG | HEART RATE: 117 BPM | SYSTOLIC BLOOD PRESSURE: 164 MMHG | OXYGEN SATURATION: 96 %

## 2019-09-23 DIAGNOSIS — T14.8XXA SURGICAL WOUND PRESENT: Primary | ICD-10-CM

## 2019-09-23 DIAGNOSIS — R58 BLEEDING: ICD-10-CM

## 2019-09-23 PROBLEM — C44.91 SKIN CANCER, BASAL CELL: Status: ACTIVE | Noted: 2019-09-23

## 2019-09-23 LAB
ALBUMIN SERPL BCP-MCNC: 3.7 G/DL (ref 3.5–5)
ALP SERPL-CCNC: 91 U/L (ref 46–116)
ALT SERPL W P-5'-P-CCNC: 58 U/L (ref 12–78)
ANION GAP SERPL CALCULATED.3IONS-SCNC: 9 MMOL/L (ref 4–13)
APTT PPP: 32 SECONDS (ref 23–37)
AST SERPL W P-5'-P-CCNC: 48 U/L (ref 5–45)
BASOPHILS # BLD AUTO: 0.01 THOUSANDS/ΜL (ref 0–0.1)
BASOPHILS NFR BLD AUTO: 0 % (ref 0–1)
BILIRUB SERPL-MCNC: 0.7 MG/DL (ref 0.2–1)
BUN SERPL-MCNC: 12 MG/DL (ref 5–25)
CALCIUM SERPL-MCNC: 8.3 MG/DL (ref 8.3–10.1)
CHLORIDE SERPL-SCNC: 106 MMOL/L (ref 100–108)
CO2 SERPL-SCNC: 27 MMOL/L (ref 21–32)
CREAT SERPL-MCNC: 0.94 MG/DL (ref 0.6–1.3)
EOSINOPHIL # BLD AUTO: 0.14 THOUSAND/ΜL (ref 0–0.61)
EOSINOPHIL NFR BLD AUTO: 4 % (ref 0–6)
ERYTHROCYTE [DISTWIDTH] IN BLOOD BY AUTOMATED COUNT: 13.5 % (ref 11.6–15.1)
GFR SERPL CREATININE-BSD FRML MDRD: 83 ML/MIN/1.73SQ M
GLUCOSE SERPL-MCNC: 142 MG/DL (ref 65–140)
HCT VFR BLD AUTO: 39.4 % (ref 36.5–49.3)
HGB BLD-MCNC: 13.6 G/DL (ref 12–17)
IMM GRANULOCYTES # BLD AUTO: 0.02 THOUSAND/UL (ref 0–0.2)
IMM GRANULOCYTES NFR BLD AUTO: 1 % (ref 0–2)
INR PPP: 1.28 (ref 0.84–1.19)
LYMPHOCYTES # BLD AUTO: 0.93 THOUSANDS/ΜL (ref 0.6–4.47)
LYMPHOCYTES NFR BLD AUTO: 24 % (ref 14–44)
MCH RBC QN AUTO: 29.8 PG (ref 26.8–34.3)
MCHC RBC AUTO-ENTMCNC: 34.5 G/DL (ref 31.4–37.4)
MCV RBC AUTO: 86 FL (ref 82–98)
MONOCYTES # BLD AUTO: 0.6 THOUSAND/ΜL (ref 0.17–1.22)
MONOCYTES NFR BLD AUTO: 15 % (ref 4–12)
NEUTROPHILS # BLD AUTO: 2.22 THOUSANDS/ΜL (ref 1.85–7.62)
NEUTS SEG NFR BLD AUTO: 56 % (ref 43–75)
NRBC BLD AUTO-RTO: 0 /100 WBCS
PLATELET # BLD AUTO: 220 THOUSANDS/UL (ref 149–390)
PMV BLD AUTO: 10.1 FL (ref 8.9–12.7)
POTASSIUM SERPL-SCNC: 3.8 MMOL/L (ref 3.5–5.3)
PROT SERPL-MCNC: 7.4 G/DL (ref 6.4–8.2)
PROTHROMBIN TIME: 15.3 SECONDS (ref 11.6–14.5)
RBC # BLD AUTO: 4.57 MILLION/UL (ref 3.88–5.62)
SODIUM SERPL-SCNC: 142 MMOL/L (ref 136–145)
WBC # BLD AUTO: 3.92 THOUSAND/UL (ref 4.31–10.16)

## 2019-09-23 PROCEDURE — 85025 COMPLETE CBC W/AUTO DIFF WBC: CPT | Performed by: PHYSICIAN ASSISTANT

## 2019-09-23 PROCEDURE — 36415 COLL VENOUS BLD VENIPUNCTURE: CPT | Performed by: PHYSICIAN ASSISTANT

## 2019-09-23 PROCEDURE — 80053 COMPREHEN METABOLIC PANEL: CPT | Performed by: PHYSICIAN ASSISTANT

## 2019-09-23 PROCEDURE — 99284 EMERGENCY DEPT VISIT MOD MDM: CPT | Performed by: PHYSICIAN ASSISTANT

## 2019-09-23 PROCEDURE — 85610 PROTHROMBIN TIME: CPT | Performed by: PHYSICIAN ASSISTANT

## 2019-09-23 PROCEDURE — 88305 TISSUE EXAM BY PATHOLOGIST: CPT | Performed by: PATHOLOGY

## 2019-09-23 PROCEDURE — 99283 EMERGENCY DEPT VISIT LOW MDM: CPT

## 2019-09-23 PROCEDURE — 85730 THROMBOPLASTIN TIME PARTIAL: CPT | Performed by: PHYSICIAN ASSISTANT

## 2019-09-23 RX ORDER — LIDOCAINE HYDROCHLORIDE AND EPINEPHRINE 10; 10 MG/ML; UG/ML
1 INJECTION, SOLUTION INFILTRATION; PERINEURAL ONCE
Status: COMPLETED | OUTPATIENT
Start: 2019-09-23 | End: 2019-09-23

## 2019-09-23 RX ORDER — CEPHALEXIN 500 MG/1
500 CAPSULE ORAL EVERY 12 HOURS SCHEDULED
Qty: 10 CAPSULE | Refills: 0 | Status: SHIPPED | OUTPATIENT
Start: 2019-09-23 | End: 2019-09-28

## 2019-09-23 RX ORDER — GINSENG 100 MG
1 CAPSULE ORAL ONCE
Status: COMPLETED | OUTPATIENT
Start: 2019-09-23 | End: 2019-09-23

## 2019-09-23 RX ORDER — CEPHALEXIN 250 MG/1
500 CAPSULE ORAL ONCE
Status: COMPLETED | OUTPATIENT
Start: 2019-09-23 | End: 2019-09-23

## 2019-09-23 RX ADMIN — BACITRACIN ZINC 1 SMALL APPLICATION: 500 OINTMENT TOPICAL at 22:59

## 2019-09-23 RX ADMIN — CEPHALEXIN 500 MG: 250 CAPSULE ORAL at 23:01

## 2019-09-23 RX ADMIN — LIDOCAINE HYDROCHLORIDE,EPINEPHRINE BITARTRATE 1 ML: 10; .01 INJECTION, SOLUTION INFILTRATION; PERINEURAL at 21:47

## 2019-09-23 RX ADMIN — SODIUM CHLORIDE 1000 ML: 0.9 INJECTION, SOLUTION INTRAVENOUS at 21:50

## 2019-09-26 NOTE — ED PROVIDER NOTES
Pt Name: Erickson Montanez  MRN: [id number]  YOB: 1950  Age/Sex: 77 y o  male  Date of evaluation: 9/23/2019  PCP: Linn Stauffer MD    [address] [place] [address]    Chief Complaint   Patient presents with   [facility] Bleeding/Bruising     patient had basal cell removed from left shoulder this am  bleeding from site  takes baby aspirin         HPI    Todd Valencia presents to the Emergency Department complaining of Bleeding from biopsy site  Erickson Montanez is a 77 y o  male who presents due to Bleeding from Biopsy site  Pt reports bleeding from basal cell removal from left shoulder earlier today with Dr Tatiana King surgery  Pt reports he saturated a shirt with blood  Pt takes 81 mg ASA daily, no other antiplatelet, anticoagulants  Significant past medical history of Colon polyp, COPD, Coronary artery disease, GERD , Hyperlipidemia, Hypertension, PVD, and Seasonal allergies  Denies lightheadedness, dizziness, weakness, fatigue, and no other complaints at this time  History provided by:  Patient   used: No          Past Medical and Surgical History    Past Medical History:   Diagnosis Date    Colon polyp     [facility]     Coronary artery disease     GERD (gastroesophageal reflux disease)     Hyperlipidemia     Hypertension     PVD (peripheral vascular disease) ([facility] )     Seasonal allergies        Past Surgical History:   Procedure Laterality Date    APPENDECTOMY  2016    CARDIAC SURGERY  2012    stents x3    COLONOSCOPY  2008    boonswang    KNEE ARTHROSCOPY Bilateral     post work injury    NC COLONOSCOPY FLX DX W/COLLJ SPEC WHEN PFRMD N/A 12/20/2018    Procedure: COLONOSCOPY;  Surgeon: Aishwarya Garcia MD;  Location: [place] [address] GI LAB;   Service: Gastroenterology    SHOULDER SURGERY Right     cheese tumor removed    TONSILLECTOMY      age 25       Family History   Problem Relation Age of Onset    Heart disease Mother    [facility] Cancer Mother         breast    No Known Problems Father        Social History     Tobacco Use    Smoking status: Current Some Day Smoker     Packs/day: 0 25     Years: 50 00     Pack years: 12 50    Smokeless tobacco: Never Used   Substance Use Topics    Alcohol use: No    Drug use: No       Allergies    Allergies   Allergen Reactions    Naproxen Swelling and Rash       Home Medications:    Prior to Admission medications    Medication Sig Start Date End Date Taking? Authorizing Provider   amLODIPine (NORVASC) 2 5 mg tablet Take 1 tablet (2 5 mg total) by mouth daily 6/24/19   Dionisio Goins MD   atorvastatin (LIPITOR) 40 mg tablet TAKE 1 TABLET(40 MG) BY MOUTH DAILY 7/5/19   Dionisio Goins MD   cephalexin [facility]) 500 mg capsule Take 1 capsule (500 mg total) by mouth every 12 (twelve) hours for 5 days 9/23/19 9/28/19  Gina Rodriguez PA-C   fluticasone (FLONASE) 50 mcg/act nasal spray INSTILL 2 SPRAYS INTO EACH NOSTRIL DAILY 7/11/19   Dionisio Goins MD   loratadine (CLARITIN) 10 mg tablet Take 1 tablet (10 mg total) by mouth daily 5/14/19   Dionisio Goins MD   meclizine (ANTIVERT) 12 5 MG tablet Take 1 tablet (12 5 mg total) by mouth every 8 (eight) hours as needed for dizziness 5/14/19   Dionisio Goins MD   mometasone-formoterol [facility]) 200-5 MCG/ACT inhaler Inhale 2 puffs 2 (two) times a day Rinse mouth after use  11/20/18   Dionisio Goins MD   Multiple Vitamins-Minerals (CENTRUM ULTRA MENS) TABS Take by mouth    Historical Provider, MD           Review of Systems    Review of Systems   Constitutional: Negative for activity change, appetite change, chills, diaphoresis, fatigue and fever  Respiratory: Negative for cough and shortness of breath  Cardiovascular: Negative for chest pain  Gastrointestinal: Negative for abdominal pain  Skin: Positive for wound  Negative for rash  Neurological: Negative for headaches     All other systems reviewed and are negative  All other systems reviewed and negative  Physical Exam      ED Triage Vitals [09/23/19 2110]   Temperature Pulse Respirations Blood Pressure SpO2   98 4 °F (36 9 °C) (!) 117 18 164/87 96 %      Temp Source Heart Rate Source Patient Position - Orthostatic VS BP Location FiO2 (%)   Oral Monitor Sitting Left arm --      Pain Score       --               Physical Exam   Constitutional: He is oriented to person, place, and time  He appears well-developed and well-nourished  No distress  HENT:   Head: Normocephalic and atraumatic  Eyes: Pupils are equal, round, and reactive to light  Conjunctivae and EOM are normal    Neck: Normal range of motion  Neck supple  Cardiovascular: Normal rate, regular rhythm, normal heart sounds and intact distal pulses  Exam reveals no gallop and no friction rub  No murmur heard  Pulmonary/Chest: Effort normal and breath sounds normal  No stridor  No respiratory distress  He has no wheezes  He has no rales  He exhibits no tenderness  Musculoskeletal: Normal range of motion  Neurological: He is alert and oriented to person, place, and time  Skin: Skin is warm  Capillary refill takes less than 2 seconds  He is not diaphoretic  Nursing note and vitals reviewed            Diagnostic Results    ECG      Labs:    Results for orders placed or performed during the hospital encounter of 09/23/19   CBC and differential   Result Value Ref Range    WBC 3 92 (L) 4 31 - 10 16 Thousand/uL    RBC 4 57 3 88 - 5 62 Million/uL    Hemoglobin 13 6 12 0 - 17 0 g/dL    Hematocrit 39 4 36 5 - 49 3 %    MCV 86 82 - 98 fL    MCH 29 8 26 8 - 34 3 pg    MCHC 34 5 31 4 - 37 4 g/dL    RDW 13 5 11 6 - 15 1 %    MPV 10 1 8 9 - 12 7 fL    Platelets 971 065 - 806 Thousands/uL    nRBC 0 /100 WBCs    Neutrophils Relative 56 43 - 75 %    Immat GRANS % 1 0 - 2 %    Lymphocytes Relative 24 14 - 44 %    Monocytes Relative 15 (H) 4 - 12 %    Eosinophils Relative 4 0 - 6 % Basophils Relative 0 0 - 1 %    Neutrophils Absolute 2 22 1 85 - 7 62 Thousands/µL    Immature Grans Absolute 0 02 0 00 - 0 20 Thousand/uL    Lymphocytes Absolute 0 93 0 60 - 4 47 Thousands/µL    Monocytes Absolute 0 60 0 17 - 1 22 Thousand/µL    Eosinophils Absolute 0 14 0 00 - 0 61 Thousand/µL    Basophils Absolute 0 01 0 00 - 0 10 Thousands/µL   Comprehensive metabolic panel   Result Value Ref Range    Sodium 142 136 - 145 mmol/L    Potassium 3 8 3 5 - 5 3 mmol/L    Chloride 106 100 - 108 mmol/L    CO2 27 21 - 32 mmol/L    ANION GAP 9 4 - 13 mmol/L    BUN 12 5 - 25 mg/dL    Creatinine 0 94 0 60 - 1 30 mg/dL    Glucose 142 (H) 65 - 140 mg/dL    Calcium 8 3 8 3 - 10 1 mg/dL    AST 48 (H) 5 - 45 U/L    ALT 58 12 - 78 U/L    Alkaline Phosphatase 91 46 - 116 U/L    Total Protein 7 4 6 4 - 8 2 g/dL    Albumin 3 7 3 5 - 5 0 g/dL    Total Bilirubin 0 70 0 20 - 1 00 mg/dL    eGFR 83 ml/min/1 73sq m   Protime-INR   Result Value Ref Range    Protime 15 3 (H) 11 6 - 14 5 seconds    INR 1 28 (H) 0 84 - 1 19   APTT   Result Value Ref Range    PTT 32 23 - 37 seconds       All labs reviewed and utilized in the medical decision making process    Radiology:    No orders to display       All radiology studies independently viewed by me and interpreted by the radiologist     Procedure    Laceration repair  Date/Time: 9/26/2019 2:56 AM  Performed by: Elin Balderas PA-C  Authorized by: Elin Balderas PA-C   Consent: Verbal consent obtained  Risks and benefits: risks, benefits and alternatives were discussed  Consent given by: patient  Patient understanding: patient states understanding of the procedure being performed  Patient identity confirmed: verbally with patient, arm band and hospital-assigned identification number    Wound Dehiscence:  Superficial Wound Dehiscence: simple closure      Procedure Details:  Preparation: Patient was prepped and draped in the usual sterile fashion    Irrigation solution: saline  Irrigation method: jet lavage  Amount of cleaning: extensive  Skin closure: 3-0 nylon  Number of sutures: 9  Technique: simple  Approximation: close  Approximation difficulty: simple  Dressing: antibiotic ointment, pressure dressing and non-adhesive packing strip (surgicel placed directly over wound)  Patient tolerance: Patient tolerated the procedure well with no immediate complications  Comments: Replaced 5 sutures, additional 4 sutures placed along wound 3-0 nylon simple interrupted  Total count of 13 after my procedure  Assessment and Plan    MDM    Initial ED assessment:  Marina Caballero is a 77 y o  male with significant PMH for  Colon polyp, COPD, Coronary artery disease, GERD , Hyperlipidemia, Hypertension, PVD, and Seasonal allergies who presents with Bleeding from Biopsy site  Vitals signs reviewed and WNL  Physical examination remarkable for bleeding from left upper back surgical wound  Initial Ddx  includes but is not limited to:  Acute blood loss, surgical wound, hypercoagulable    Initial ED plan:   Plan will be to perform diagnostic testing of CBC, Coags and treat symptomatically with IVF  Final ED summary/disposition: Discussed results of diagnostic testing with pt and wife in detail  Home care recommendations given with discharge paperwork  Return to ED instructions given if new/worsening sxs        Coding    ED Course of Care and Re-Assessments    ED Course as of Sep 26 0258   Mon Sep 23, 2019   2254 Replaced 5 sutures, additional 4 sutures placed along wound 3-0 nylon simple interrupted                             Medications   sodium chloride 0 9 % bolus 1,000 mL (0 mL Intravenous Stopped 9/23/19 2259)   lidocaine-epinephrine (XYLOCAINE/EPINEPHRINE) 1 %-1:100,000 injection 1 mL (1 mL Infiltration Given by Other 9/23/19 2147)   bacitracin topical ointment 1 small application (1 small application Topical Given 9/23/19 2259)   cephalexin (KEFLEX) capsule 500 mg (500 mg Oral Given 9/23/19 7019)         FINAL IMPRESSION    Final diagnoses:   Surgical wound present   Bleeding         DISPOSITION/PLAN  Time reflects when diagnosis was documented in both MDM as applicable and the Disposition within this note     Time User Action Codes Description Comment    9/23/2019 10:52 PM Pablo Waters  8XXA] Surgical wound present     9/23/2019 10:52 PM Kassy Robert Add [R58] Bleeding       ED Disposition     ED Disposition Condition Date/Time Comment    Discharge Stable Mon Sep 23, 2019 10:51 PM Roxy Ok discharge to home/self care  Follow-up Information     Follow up With Specialties Details Why Contact Info Additional Information    Leyda Newman MD Family Medicine Go to  For follow up [address] [facility] [address] Emergency Department Emergency Medicine Go to  If symptoms worsen [address] [facility] [address] [phone] AN ED,  [address], [place], [place], [address]    General Surgery: Maria Del Carmen Naqvi MD  Call  For follow up [address] [place] [address] [phone]               PATIENT REFERRED TO:    Leyda Newman MD  [address]  [phone]    Go to   For follow up    [address] Emergency Department  [address] [facility] [address] [phone]  Go to   If symptoms worsen    General Surgery: Santy Terrazas DO, Deyanira Anderson MD  [address] [facility] [phone]  Call   For follow up      [address]:    Discharge Medication List as of 9/23/2019 10:54 PM      START taking these medications    Details   cephalexin (KEFLEX) 500 mg capsule Take 1 capsule (500 mg total) by mouth every 12 (twelve) hours for 5 days, Starting Mon 9/23/2019, Until Sat 9/28/2019, Print         CONTINUE these medications which have NOT CHANGED    Details   amLODIPine (NORVASC) 2 5 mg tablet Take 1 tablet (2 5 mg total) by mouth daily, Starting Mon 6/24/2019, Normal      atorvastatin (LIPITOR) 40 mg tablet TAKE 1 TABLET(40 MG) BY MOUTH DAILY, Normal      fluticasone (FLONASE) 50 mcg/act nasal spray INSTILL 2 SPRAYS INTO EACH NOSTRIL DAILY, Normal      loratadine (CLARITIN) 10 mg tablet Take 1 tablet (10 mg total) by mouth daily, Starting Tue 5/14/2019, Normal      meclizine (ANTIVERT) 12 5 MG tablet Take 1 tablet (12 5 mg total) by mouth every 8 (eight) hours as needed for dizziness, Starting Tue 5/14/2019, Normal      mometasone-formoterol (DULERA) 200-5 MCG/ACT inhaler Inhale 2 puffs 2 (two) times a day Rinse mouth after use , Starting Tue 11/20/2018, Sample      Multiple Vitamins-Minerals (CENTRUM ULTRA MENS) TABS Take by mouth, Historical Med             No discharge procedures on file           RAUDEL Estevez PA-C  09/26/19 0682

## 2019-09-27 ENCOUNTER — APPOINTMENT (OUTPATIENT)
Dept: PHYSICAL THERAPY | Facility: CLINIC | Age: 69
End: 2019-09-27
Payer: MEDICARE

## 2020-03-17 NOTE — PATIENT INSTRUCTIONS
Thank you for enrolling in [facility]  Please follow the instructions below to securely access your online medical record  [employer] allows you to send messages to your doctor, view your test results, renew your prescriptions, schedule appointments, and more  [address] uses Single Sign on (SSO) Technology for you to log in and access our [facility] SPECIALTY [facility], including [employer]  No more remembering multiple user names and passwords! We are going to guide you through, step by step, to help you set up your [facility] account which will provide access to your [employer] account  How Do I Sign Up? 1  In your Internet browser, go to [email or web address]       2  Click on the   [employer] patient account and then click Dont have an                 Account? Create one now      3  Enter your demographic information and chose a user name (email address) and password  Think of one that is secure and easy to remember  Enter a Referral code if you have one (this is not your [employer] code ) Accept the Terms and Conditions and the Privacy Policy  4  Select your security questions that you will use to reset your password should you forget it  Click Submit  5  Enter your [employer] Activation Code exactly as it appears below  You will not need to use this code after you have completed the sign-up process  If you do not sign up before the expiration date, you must request a new code  [employer] Activation Code: [email or web address]  Expires: 12/28/2018  9:17 AM    6  Confirm your email address  An email confirmation was sent to you  Please open that email and click Confirm your Email   You should then be redirected to our [facility] Single sign on page, where you will log on with the user name and password you created! Proceed to the [employer] Icon to view your personal health information          Additional Information  If you have questions, you can e-mail patient  [email or web address] or call [phone] to talk to our customer support staff  Remember, MyChart is NOT to be used for urgent needs  For medical emergencies, dial 911  Detail Level: Detailed Quality 130: Documentation Of Current Medications In The Medical Record: Current Medications Documented Quality 402: Tobacco Use And Help With Quitting Among Adolescents: Patient screened for tobacco and never smoked Quality 431: Preventive Care And Screening: Unhealthy Alcohol Use - Screening: Patient screened for unhealthy alcohol use using a single question and scores less than 2 times per year Quality 110: Preventive Care And Screening: Influenza Immunization: Influenza immunization was not ordered or administered, reason not given

## 2020-03-20 ENCOUNTER — TELEPHONE (OUTPATIENT)
Dept: FAMILY MEDICINE CLINIC | Facility: CLINIC | Age: 70
End: 2020-03-20

## 2020-03-20 NOTE — TELEPHONE ENCOUNTER
I called patient to vet him for Monday visit  He has had a cough x 6 months which wife thinks is related to his smoking  Also, he has a rash on his ankle that she believes is eczema   She just wants to make sure he is ok to come in

## 2020-04-22 ENCOUNTER — TELEMEDICINE (OUTPATIENT)
Dept: FAMILY MEDICINE CLINIC | Facility: CLINIC | Age: 70
End: 2020-04-22
Payer: MEDICARE

## 2020-04-22 VITALS — TEMPERATURE: 98.6 F

## 2020-04-22 DIAGNOSIS — K04.7 DENTAL ABSCESS: Primary | ICD-10-CM

## 2020-04-22 PROCEDURE — 99213 OFFICE O/P EST LOW 20 MIN: CPT | Performed by: FAMILY MEDICINE

## 2020-04-22 RX ORDER — AMOXICILLIN 500 MG/1
CAPSULE ORAL
Qty: 29 CAPSULE | Refills: 0 | Status: SHIPPED | OUTPATIENT
Start: 2020-04-22 | End: 2020-05-01

## 2020-04-30 ENCOUNTER — TELEPHONE (OUTPATIENT)
Dept: FAMILY MEDICINE CLINIC | Facility: CLINIC | Age: 70
End: 2020-04-30

## 2020-05-21 ENCOUNTER — TELEPHONE (OUTPATIENT)
Dept: FAMILY MEDICINE CLINIC | Facility: CLINIC | Age: 70
End: 2020-05-21

## 2020-05-21 DIAGNOSIS — K04.7 DENTAL ABSCESS: Primary | ICD-10-CM

## 2020-05-21 RX ORDER — AMOXICILLIN 500 MG/1
500 CAPSULE ORAL EVERY 8 HOURS SCHEDULED
Qty: 30 CAPSULE | Refills: 0 | Status: SHIPPED | OUTPATIENT
Start: 2020-05-21 | End: 2020-05-31

## 2020-06-17 ENCOUNTER — HOSPITAL ENCOUNTER (OUTPATIENT)
Dept: NON INVASIVE DIAGNOSTICS | Facility: CLINIC | Age: 70
Discharge: HOME | End: 2020-06-17
Payer: MEDICARE

## 2020-06-17 DIAGNOSIS — I70.1 STENOSIS OF LEFT RENAL ARTERY (HCC): ICD-10-CM

## 2020-06-17 DIAGNOSIS — Q25.1 ABDOMINAL AORTIC STENOSIS: ICD-10-CM

## 2020-06-17 DIAGNOSIS — I77.1 BILATERAL ILIAC ARTERY STENOSIS (HCC): ICD-10-CM

## 2020-06-17 DIAGNOSIS — I70.213 ATHEROSCLEROSIS OF NATIVE ARTERIES OF EXTREMITIES WITH INTERMITTENT CLAUDICATION, BILATERAL LEGS (HCC): ICD-10-CM

## 2020-06-17 PROCEDURE — 93923 UPR/LXTR ART STDY 3+ LVLS: CPT

## 2020-06-17 PROCEDURE — NC001 PR NO CHARGE: Performed by: SURGERY

## 2020-06-17 PROCEDURE — 93925 LOWER EXTREMITY STUDY: CPT

## 2020-06-17 PROCEDURE — 93978 VASCULAR STUDY: CPT | Performed by: SURGERY

## 2020-06-17 PROCEDURE — 93922 UPR/L XTREMITY ART 2 LEVELS: CPT | Performed by: SURGERY

## 2020-06-17 PROCEDURE — 93978 VASCULAR STUDY: CPT

## 2020-06-18 ENCOUNTER — TELEPHONE (OUTPATIENT)
Dept: VASCULAR SURGERY | Facility: CLINIC | Age: 70
End: 2020-06-18

## 2020-06-25 DIAGNOSIS — I10 ESSENTIAL HYPERTENSION: ICD-10-CM

## 2020-06-25 RX ORDER — AMLODIPINE BESYLATE 2.5 MG/1
TABLET ORAL
Qty: 90 TABLET | Refills: 3 | Status: SHIPPED | OUTPATIENT
Start: 2020-06-25 | End: 2021-09-01 | Stop reason: SDUPTHER

## 2020-07-13 ENCOUNTER — OFFICE VISIT (OUTPATIENT)
Dept: FAMILY MEDICINE CLINIC | Facility: CLINIC | Age: 70
End: 2020-07-13
Payer: MEDICARE

## 2020-07-13 VITALS
BODY MASS INDEX: 27.35 KG/M2 | HEIGHT: 70 IN | RESPIRATION RATE: 18 BRPM | HEART RATE: 76 BPM | WEIGHT: 191 LBS | DIASTOLIC BLOOD PRESSURE: 78 MMHG | SYSTOLIC BLOOD PRESSURE: 142 MMHG | TEMPERATURE: 98.4 F

## 2020-07-13 DIAGNOSIS — I70.213 ATHEROSCLEROSIS OF NATIVE ARTERIES OF EXTREMITIES WITH INTERMITTENT CLAUDICATION, BILATERAL LEGS (HCC): ICD-10-CM

## 2020-07-13 DIAGNOSIS — I10 ESSENTIAL HYPERTENSION: ICD-10-CM

## 2020-07-13 DIAGNOSIS — L30.9 ECZEMA, UNSPECIFIED TYPE: ICD-10-CM

## 2020-07-13 DIAGNOSIS — Z00.00 MEDICARE ANNUAL WELLNESS VISIT, SUBSEQUENT: Primary | ICD-10-CM

## 2020-07-13 DIAGNOSIS — J44.9 MODERATE COPD (CHRONIC OBSTRUCTIVE PULMONARY DISEASE) (HCC): ICD-10-CM

## 2020-07-13 PROBLEM — IMO0001 AORTIC SCLEROSIS: Status: ACTIVE | Noted: 2020-07-13

## 2020-07-13 PROBLEM — R91.1 LUNG NODULE, SOLITARY: Status: ACTIVE | Noted: 2020-07-13

## 2020-07-13 PROCEDURE — 99214 OFFICE O/P EST MOD 30 MIN: CPT | Performed by: NURSE PRACTITIONER

## 2020-07-13 PROCEDURE — 3077F SYST BP >= 140 MM HG: CPT | Performed by: NURSE PRACTITIONER

## 2020-07-13 PROCEDURE — 3078F DIAST BP <80 MM HG: CPT | Performed by: NURSE PRACTITIONER

## 2020-07-13 PROCEDURE — 1170F FXNL STATUS ASSESSED: CPT | Performed by: NURSE PRACTITIONER

## 2020-07-13 PROCEDURE — 1125F AMNT PAIN NOTED PAIN PRSNT: CPT | Performed by: NURSE PRACTITIONER

## 2020-07-13 PROCEDURE — 1123F ACP DISCUSS/DSCN MKR DOCD: CPT | Performed by: NURSE PRACTITIONER

## 2020-07-13 PROCEDURE — 1160F RVW MEDS BY RX/DR IN RCRD: CPT | Performed by: NURSE PRACTITIONER

## 2020-07-13 PROCEDURE — 3008F BODY MASS INDEX DOCD: CPT | Performed by: NURSE PRACTITIONER

## 2020-07-13 PROCEDURE — G0439 PPPS, SUBSEQ VISIT: HCPCS | Performed by: NURSE PRACTITIONER

## 2020-07-13 PROCEDURE — 4004F PT TOBACCO SCREEN RCVD TLK: CPT | Performed by: NURSE PRACTITIONER

## 2020-07-13 RX ORDER — ASPIRIN 81 MG/1
81 TABLET ORAL DAILY
Start: 2020-07-13

## 2020-07-13 NOTE — PROGRESS NOTES
Assessment/Plan:     Diagnoses and all orders for this visit:    Medicare annual wellness visit, subsequent    Atherosclerosis of native arteries of extremities with intermittent claudication, bilateral legs (HCC)  -     aspirin (ECOTRIN LOW STRENGTH) 81 mg EC tablet; Take 1 tablet (81 mg total) by mouth daily    Essential hypertension    Moderate COPD (chronic obstructive pulmonary disease) (HCC)    Eczema, unspecified type    Other orders  -     Cancel: Comprehensive metabolic panel; Future  -     Cancel: Lipid panel; Future  -     Cancel: PSA, Total Screen; Future  -     Cancel: CBC and differential; Future    #1 Medicare annual wellness visit, subsequent  #2 Atherosclerosis of native arteries of extremities with intermittent claudication , bilateral legs  Stable - Patient is being followed by Vascular surgery annually  #3 Essential hypertension  Stable - continue on current medications and recheck in 6 months  Discussed with patient that he as some lab work previously ordered waiting to be performed  #4 Moderate COPD  Stable - patient as not been using inhalers lately without adverse effects noted - recheck in 6 months  #5 Eczema  Discussed with patient plan to use a moistizering body soap and continue to use over the counter body cream to manage  Patient instructed to return in 6 months for next follow up or sooner if needed  Patient instructed to call for problems or concerns in the interim    Subjective:      Patient ID: Asim Granados is a 72 y o  male  72 y o male presenting for Medicare annual Wellness visit and his 6 month routine follow up for chronic medical conditions  Patient continues to smoke but as been attempting to cut back on daily cigarette use  He is up-to-date with vascular surgery follow up and diagnostics  He denies chest pain, palpitations, shortness of breath on exertion or other CV symptoms  He reports stopping use of daily inhalers and denies increased symptoms          Family History   Problem Relation Age of Onset    Heart disease Mother     Cancer Mother         breast    No Known Problems Father      Social History     Socioeconomic History    Marital status: /Civil Union     Spouse name: Not on file    Number of children: Not on file    Years of education: Not on file    Highest education level: Not on file   Occupational History    Not on file   Social Needs    Financial resource strain: Not on file    Food insecurity:     Worry: Not on file     Inability: Not on file    Transportation needs:     Medical: Not on file     Non-medical: Not on file   Tobacco Use    Smoking status: Current Some Day Smoker     Packs/day: 0 25     Years: 50 00     Pack years: 12 50    Smokeless tobacco: Never Used   Substance and Sexual Activity    Alcohol use: No    Drug use: No    Sexual activity: Not on file   Lifestyle    Physical activity:     Days per week: Not on file     Minutes per session: Not on file    Stress: Not on file   Relationships    Social connections:     Talks on phone: Not on file     Gets together: Not on file     Attends [employer] service: Not on file     Active member of club or organization: Not on file     Attends meetings of clubs or organizations: Not on file     Relationship status: Not on file    Intimate partner violence:     Fear of current or ex partner: Not on file     Emotionally abused: Not on file     Physically abused: Not on file     Forced sexual activity: Not on file   Other Topics Concern    Not on file   Social History Narrative    Not on file     E-Cigarette/Vaping     E-Cigarette/Vaping Substances     Past Medical History:   Diagnosis Date    Colon polyp     COPD (chronic obstructive pulmonary disease) ([place] [address] )     Coronary artery disease     GERD (gastroesophageal reflux disease)     Hyperlipidemia     Hypertension     PVD (peripheral vascular disease) ([place] [address] )     Seasonal allergies      Past Surgical History:   Procedure Laterality Date    APPENDECTOMY  2016   [facility] CARDIAC SURGERY  2012    stents x3    COLONOSCOPY  2008    boonswang    KNEE ARTHROSCOPY Bilateral     post work injury    UT COLONOSCOPY FLX DX W/COLLJ SPEC WHEN PFRMD N/A 12/20/2018    Procedure: COLONOSCOPY;  Surgeon: Erick Hall MD;  Location: [place] [address] GI LAB; Service: Gastroenterology    SHOULDER SURGERY Right     cheese tumor removed    TONSILLECTOMY      age 25     Allergies   Allergen Reactions    Naproxen Swelling and Rash       Current Outpatient Medications:     amLODIPine (NORVASC) 2 5 mg tablet, TAKE 1 TABLET(2 5 MG) BY MOUTH DAILY, Disp: 90 tablet, Rfl: 3    atorvastatin (LIPITOR) 40 mg tablet, TAKE 1 TABLET(40 MG) BY MOUTH DAILY, Disp: 90 tablet, Rfl: 5    fluticasone (FLONASE) 50 mcg/act nasal spray, INSTILL 2 SPRAYS INTO EACH NOSTRIL DAILY, Disp: 16 mL, Rfl: 0    loratadine (CLARITIN) 10 mg tablet, Take 1 tablet (10 mg total) by mouth daily, Disp: 30 tablet, Rfl: 1    Multiple Vitamins-Minerals (CENTRUM ULTRA MENS) TABS, Take by mouth, Disp: , Rfl:     aspirin (ECOTRIN LOW STRENGTH) 81 mg EC tablet, Take 1 tablet (81 mg total) by mouth daily, Disp: , Rfl:     Review of Systems   Constitutional: Negative for activity change, appetite change, fatigue and unexpected weight change  HENT: Negative for dental problem, hearing loss, postnasal drip, sore throat and trouble swallowing  Eyes: Negative for pain, discharge and visual disturbance  Respiratory: Negative for cough, chest tightness and shortness of breath  Cardiovascular: Negative for chest pain, palpitations and leg swelling  Gastrointestinal: Negative for abdominal pain, constipation, diarrhea and nausea  Endocrine: Negative for polydipsia and polyuria  Genitourinary: Negative for decreased urine volume and difficulty urinating  Musculoskeletal: Negative  Skin: Positive for rash ( buttocks and right medial ankle)     Allergic/Immunologic: Positive for environmental allergies  Neurological: Negative for dizziness, weakness, light-headedness and headaches  Hematological: Negative  Psychiatric/Behavioral: Negative  Objective:    /78   Pulse 76   Temp 98 4 °F (36 9 °C)   Resp 18   Ht 5' 10" (1 778 m)   Wt 86 6 kg (191 lb)   BMI 27 41 kg/m² (Reviewed)     Physical Exam   Constitutional: He is oriented to person, place, and time  He appears well-developed and well-nourished  No distress  HENT:   Head: Normocephalic and atraumatic  Right Ear: External ear normal    Left Ear: External ear normal    Patient as face covering in place per office protocol   Eyes: Pupils are equal, round, and reactive to light  Conjunctivae and EOM are normal    Neck: Neck supple  No thyromegaly present  Cardiovascular: Normal rate and regular rhythm  Murmur heard  Pulses:       Posterior tibial pulses are 1+ on the right side, and 0 on the left side  Pulmonary/Chest: Effort normal and breath sounds normal    Abdominal: Soft  Bowel sounds are normal  There is no tenderness  Neurological: He is alert and oriented to person, place, and time  Skin: Skin is warm and dry  Capillary refill takes less than 2 seconds  Psychiatric: He has a normal mood and affect   His behavior is normal

## 2020-07-13 NOTE — PROGRESS NOTES
Assessment and Plan:     Problem List Items Addressed This Visit        Respiratory    Moderate COPD (chronic obstructive pulmonary disease) ([facility] )       Cardiovascular and Mediastinum    Atherosclerosis of native arteries of extremities with intermittent claudication, bilateral legs (HCC)    Relevant Medications    aspirin (ECOTRIN LOW STRENGTH) 81 mg EC tablet    Hypertension      Other Visit Diagnoses     Medicare annual wellness visit, subsequent    -  Primary    Eczema, unspecified type            BMI Counseling: Body mass index is 27 41 kg/m²  The BMI is above normal  Nutrition recommendations include decreasing portion sizes, encouraging healthy choices of fruits and vegetables, consuming healthier snacks, moderation in carbohydrate intake and increasing intake of lean protein  Exercise recommendations include exercising 3-5 times per week and strength training exercises  Preventive health issues were discussed with patient, and age appropriate screening tests were ordered as noted in patient's After Visit Summary  Personalized health advice and appropriate referrals for health education or preventive services given if needed, as noted in patient's After Visit Summary       History of Present Illness:     Patient presents for Medicare Annual Wellness visit    Patient Care Team:  Robert Harry MD as PCP - Paulo Howard MD Damien Gates, MD Jonnie Lamer, MD (Gastroenterology)  Ryder Koehler MD as Endoscopist     Problem List:     Patient Active Problem List   Diagnosis    Atherosclerosis of native arteries of extremities with intermittent claudication, bilateral legs ([facility] )    Fatigue    Hypertension    Bilateral iliac artery stenosis (HCC)    Abdominal aortic stenosis    [facility]    Benign paroxysmal positional vertigo    Allergic rhinitis    Dyslipidemia    Stenosis of left renal artery ([place] [address] )    Chronic pain of right knee    Skin cancer, basal cell    Lung nodule, solitary    Aortic sclerosis      Past Medical and Surgical History:     Past Medical History:   Diagnosis Date    Colon polyp     COPD (chronic obstructive pulmonary disease) ([place] [address] )     Coronary artery disease     GERD (gastroesophageal reflux disease)     Hyperlipidemia     Hypertension     PVD (peripheral vascular disease) ([place] [address] )     Seasonal allergies      Past Surgical History:   Procedure Laterality Date    APPENDECTOMY  2016    CARDIAC SURGERY  2012    stents x3    COLONOSCOPY  2008    boonswang    KNEE ARTHROSCOPY Bilateral     post work injury    DE COLONOSCOPY FLX DX W/COLLJ SPEC WHEN PFRMD N/A 12/20/2018    Procedure: COLONOSCOPY;  Surgeon: Mariana Llamas MD;  Location: [facility] [place] [facility];   Service: Gastroenterology    SHOULDER SURGERY Right     cheese tumor removed    TONSILLECTOMY      age 25      Family History:     Family History   Problem Relation Age of Onset    Heart disease Mother     Cancer Mother         breast    No Known Problems Father       Social History:        Social History     Socioeconomic History    Marital status: /Civil Union     Spouse name: None    Number of children: None    Years of education: None    Highest education level: None   Occupational History    None   Social Needs    Financial resource strain: None    Food insecurity:     Worry: None     Inability: None    Transportation needs:     Medical: None     Non-medical: None   Tobacco Use    Smoking status: Current Some Day Smoker     Packs/day: 0 25     Years: 50 00     Pack years: 12 50    Smokeless tobacco: Never Used   Substance and Sexual Activity    Alcohol use: No    Drug use: No    Sexual activity: None   Lifestyle    Physical activity:     Days per week: None     Minutes per session: None    Stress: None   Relationships    Social connections:     Talks on phone: None     Gets together: None     Attends [employer] service: None     Active member of club or organization: None     Attends meetings of clubs or organizations: None     Relationship status: None    Intimate partner violence:     Fear of current or ex partner: None     Emotionally abused: None     Physically abused: None     Forced sexual activity: None   Other Topics Concern    None   Social History Narrative    None      Medications and Allergies:     Current Outpatient Medications   Medication Sig Dispense Refill    amLODIPine (NORVASC) 2 5 mg tablet TAKE 1 TABLET(2 5 MG) BY MOUTH DAILY 90 tablet 3    atorvastatin (LIPITOR) 40 mg tablet TAKE 1 TABLET(40 MG) BY MOUTH DAILY 90 tablet 5    fluticasone (FLONASE) 50 mcg/act nasal spray INSTILL 2 SPRAYS INTO EACH NOSTRIL DAILY 16 mL 0    loratadine (CLARITIN) 10 mg tablet Take 1 tablet (10 mg total) by mouth daily 30 tablet 1    Multiple Vitamins-Minerals (CENTRUM ULTRA MENS) TABS Take by mouth      aspirin (ECOTRIN LOW STRENGTH) 81 mg EC tablet Take 1 tablet (81 mg total) by mouth daily       No current facility-administered medications for this visit  Allergies   Allergen Reactions    Naproxen Swelling and Rash      Immunizations: There is no immunization history on file for this patient  Health Maintenance:         Topic Date Due    Hepatitis C Screening  1950    CRC Screening: Colonoscopy  12/20/2021         Topic Date Due    DTaP,Tdap,and Td Vaccines (1 - Tdap) 02[birth year]01/1962    Pneumococcal Vaccine: 65+ Years (1 of 2 - PCV13) 02/01/2016    Influenza Vaccine  07/01/2020      Medicare Health Risk Assessment:     /78   Pulse 76   Temp 98 4 °F (36 9 °C)   Resp 18   Ht 5' 10" (1 778 m)   Wt 86 6 kg (191 lb)   BMI 27 41 kg/m²      Naa Bustos is here for his Subsequent Wellness visit  Last Medicare Wellness visit information reviewed, patient interviewed, no change since last AWV  Health Risk Assessment:   Patient rates overall health as very good   Patient feels that their physical health rating is same  Hearing was rated as same  Patient feels that their emotional and mental health rating is same  Pain experienced in the last 7 days has been some  Patient's pain rating has been 1/10  Patient states that he has experienced no weight loss or gain in last 6 months  Depression Screening:   PHQ-2 Score: 0      Fall Risk Screening: In the past year, patient has experienced: no history of falling in past year      Home Safety:  Patient does not have trouble with stairs inside or outside of their home  Patient has working smoke alarms and has working carbon monoxide detector  Home safety hazards include: none  Nutrition:   Current diet is Regular and Frequent junk food  Medications:   Patient is currently taking over-the-counter supplements  OTC medications include: see medication list  Patient is able to manage medications  Activities of Daily Living (ADLs)/Instrumental Activities of Daily Living (IADLs):   Walk and transfer into and out of bed and chair?: Yes  Dress and groom yourself?: Yes    Bathe or shower yourself?: Yes    Feed yourself?  Yes  Do your laundry/housekeeping?: Yes  Manage your money, pay your bills and track your expenses?: Yes  Make your own meals?: Yes    Do your own shopping?: Yes    Previous Hospitalizations:   Any hospitalizations or ED visits within the last 12 months?: No      Advance Care Planning:   Living will: No    Durable POA for healthcare: No    Advanced directive: No      Cognitive Screening:   Provider or family/friend/caregiver concerned regarding cognition?: No    PREVENTIVE SCREENINGS      Cardiovascular Screening:    General: Screening Current      Diabetes Screening:     General: Screening Current      Colorectal Cancer Screening:     General: Screening Current      Osteoporosis Screening:    General: Screening Not Indicated      Abdominal Aortic Aneurysm (AAA) Screening:    Risk factors include: age between 73-69 yo and tobacco use        Lung Cancer Screening:     General: Screening Not Indicated    Other Counseling Topics:   Alcohol use counseling, car/seat belt/driving safety, skin self-exam and sunscreen         [address] [place]

## 2020-09-26 DIAGNOSIS — I70.212 ATHEROSCLER OF NATIVE ARTERY OF LEFT LEG WITH INTERMIT CLAUDICATION (HCC): ICD-10-CM

## 2020-09-28 RX ORDER — ATORVASTATIN CALCIUM 40 MG/1
TABLET, FILM COATED ORAL
Qty: 90 TABLET | Refills: 5 | Status: SHIPPED | OUTPATIENT
Start: 2020-09-28 | End: 2022-01-03

## 2020-11-30 ENCOUNTER — TELEMEDICINE (OUTPATIENT)
Dept: FAMILY MEDICINE CLINIC | Facility: CLINIC | Age: 70
End: 2020-11-30
Payer: MEDICARE

## 2020-11-30 ENCOUNTER — TELEPHONE (OUTPATIENT)
Dept: FAMILY MEDICINE CLINIC | Facility: CLINIC | Age: 70
End: 2020-11-30

## 2020-11-30 DIAGNOSIS — J01.10 ACUTE NON-RECURRENT FRONTAL SINUSITIS: ICD-10-CM

## 2020-11-30 PROCEDURE — 99213 OFFICE O/P EST LOW 20 MIN: CPT | Performed by: FAMILY MEDICINE

## 2020-11-30 RX ORDER — DEXTROMETHORPHAN HYDROBROMIDE AND PROMETHAZINE HYDROCHLORIDE 15; 6.25 MG/5ML; MG/5ML
5 SOLUTION ORAL 4 TIMES DAILY PRN
Qty: 150 ML | Refills: 0 | Status: SHIPPED | OUTPATIENT
Start: 2020-11-30 | End: 2021-01-19 | Stop reason: ALTCHOICE

## 2020-11-30 RX ORDER — AMOXICILLIN 875 MG/1
875 TABLET, COATED ORAL 2 TIMES DAILY
Qty: 20 TABLET | Refills: 0 | Status: SHIPPED | OUTPATIENT
Start: 2020-11-30 | End: 2020-12-10

## 2021-01-19 ENCOUNTER — OFFICE VISIT (OUTPATIENT)
Dept: FAMILY MEDICINE CLINIC | Facility: CLINIC | Age: 71
End: 2021-01-19
Payer: MEDICARE

## 2021-01-19 VITALS
DIASTOLIC BLOOD PRESSURE: 80 MMHG | HEIGHT: 70 IN | TEMPERATURE: 98.7 F | SYSTOLIC BLOOD PRESSURE: 122 MMHG | BODY MASS INDEX: 27.92 KG/M2 | HEART RATE: 90 BPM | OXYGEN SATURATION: 98 % | WEIGHT: 195 LBS

## 2021-01-19 DIAGNOSIS — I70.213 ATHEROSCLEROSIS OF NATIVE ARTERIES OF EXTREMITIES WITH INTERMITTENT CLAUDICATION, BILATERAL LEGS (HCC): ICD-10-CM

## 2021-01-19 DIAGNOSIS — M25.561 CHRONIC PAIN OF RIGHT KNEE: ICD-10-CM

## 2021-01-19 DIAGNOSIS — Z12.2 ENCOUNTER FOR SCREENING FOR LUNG CANCER: ICD-10-CM

## 2021-01-19 DIAGNOSIS — L30.9 ECZEMA, UNSPECIFIED TYPE: ICD-10-CM

## 2021-01-19 DIAGNOSIS — G89.29 CHRONIC PAIN OF RIGHT KNEE: ICD-10-CM

## 2021-01-19 DIAGNOSIS — R91.1 LUNG NODULE, SOLITARY: ICD-10-CM

## 2021-01-19 DIAGNOSIS — J44.9 MODERATE COPD (CHRONIC OBSTRUCTIVE PULMONARY DISEASE) (HCC): Primary | ICD-10-CM

## 2021-01-19 DIAGNOSIS — E78.5 DYSLIPIDEMIA: ICD-10-CM

## 2021-01-19 DIAGNOSIS — I77.1 BILATERAL ILIAC ARTERY STENOSIS (HCC): ICD-10-CM

## 2021-01-19 DIAGNOSIS — I10 ESSENTIAL HYPERTENSION: ICD-10-CM

## 2021-01-19 DIAGNOSIS — Z12.5 SCREENING FOR PROSTATE CANCER: ICD-10-CM

## 2021-01-19 PROCEDURE — 99214 OFFICE O/P EST MOD 30 MIN: CPT | Performed by: FAMILY MEDICINE

## 2021-01-19 RX ORDER — TRIAMCINOLONE ACETONIDE 1 MG/G
CREAM TOPICAL 2 TIMES DAILY
Qty: 30 G | Refills: 0 | Status: SHIPPED | OUTPATIENT
Start: 2021-01-19 | End: 2022-07-27 | Stop reason: SDUPTHER

## 2021-01-19 NOTE — PROGRESS NOTES
Assessment/Plan: Moderate COPD (chronic obstructive pulmonary disease) (HCC)  Lungs are clear  Urged smoking cessation  Refer for CT of the lungs-lung cancer screening  Recheck 6 months    Atherosclerosis of native arteries of extremities with intermittent claudication, bilateral legs (HCC)  Poor pulses on exam though patient is asymptomatic  Continue to monitor  Continue atorvastatin  Follow-up with vascular surgery  Recheck 6 months    Hypertension  Well controlled  Continue present medications  Recheck 6 months    Bilateral iliac artery stenosis (HCC)  Poor pedal pulses though patient is asymptomatic  Follow-up with vascular surgery  Continue to monitor  Recheck 6 months    Dyslipidemia  Lipids have been at goal   Continue atorvastatin  Urged diet control  Recheck 6 months    Chronic pain of right knee  Some intermittent swelling  Continue Tylenol p r n     Avoid exacerbating positions in activities  Recheck 6 months-earlier if worse    Lung nodule, solitary  Recheck CT of the chest       Diagnoses and all orders for this visit:    Moderate COPD (chronic obstructive pulmonary disease) (HCC)  -     Comprehensive metabolic panel; Future  -     CBC and differential; Future    Atherosclerosis of native arteries of extremities with intermittent claudication, bilateral legs (HCC)  -     Comprehensive metabolic panel; Future  -     CBC and differential; Future  -     Lipid panel; Future    Essential hypertension  -     Comprehensive metabolic panel; Future  -     CBC and differential; Future  -     Lipid panel; Future    Bilateral iliac artery stenosis (HCC)    Dyslipidemia    Lung nodule, solitary    Chronic pain of right knee    Eczema, unspecified type  -     triamcinolone (KENALOG) 0 1 % cream; Apply topically 2 (two) times a day    Encounter for screening for lung cancer  -     CT lung screening program; Future    Screening for prostate cancer  -     PSA, Total Screen;  Future          Subjective: Patient ID: Adalberto Medellin is a 72 y o  male  f/u multiple med issues  - has persistent rash on L lateral ankle  (+) pruritic  No change with moisturizers  - pt still smoking approx 1/2ppd, though some days he does not smoke at all  Does not feel SOB  - pt due to see Vasc Surgery  Pt denies any claudication symptoms at present  - pt does not exercise regularly but stays active  Denies CP, palpitations, lightheadedness or other CV symptoms with or without exertion  - no new Gi issues  Due for colonoscopy later this year  - still notes some increased R knee pain with kneeling, flexion  No worsening symptoms  The following portions of the patient's history were reviewed and updated as appropriate:   He  has a past medical history of Colon polyp, COPD (chronic obstructive pulmonary disease) ([facility] [address] ), Coronary artery disease, GERD (gastroesophageal reflux disease), Hyperlipidemia, Hypertension, PVD (peripheral vascular disease) ([facility] and Seasonal allergies  He   Patient Active Problem List    Diagnosis Date Noted    Lung nodule, solitary 07/13/2020    Aortic sclerosis 07/13/2020    Skin cancer, basal cell 09/23/2019    Chronic pain of right knee 07/24/2019    Dyslipidemia 06/17/2019    Stenosis of left renal artery (HCC) 06/17/2019    Benign paroxysmal positional vertigo 05/15/2019    Allergic rhinitis 05/15/2019    Moderate COPD (chronic obstructive pulmonary disease) ([facility] [address] ) 11/20/2018    Atherosclerosis of native arteries of extremities with intermittent claudication, bilateral legs ([facility] [address] ) 05/08/2017    Bilateral iliac artery stenosis (HCC) 05/08/2017    Abdominal aortic stenosis 05/08/2017    Fatigue 01/21/2013    Hypertension 01/21/2013     He  has a past surgical history that includes Colonoscopy (2008); Cardiac surgery (2012); Knee arthroscopy (Bilateral); Shoulder surgery (Right); Tonsillectomy;  Appendectomy (2016); and pr colonoscopy flx dx w/collj spec when pfrmd (N/A, 12/20/2018)  He  reports that he has been smoking  He has a 12 50 pack-year smoking history  He has never used smokeless tobacco  He reports that he does not drink alcohol or use drugs  Current Outpatient Medications   Medication Sig Dispense Refill    amLODIPine (NORVASC) 2 5 mg tablet TAKE 1 TABLET(2 5 MG) BY MOUTH DAILY 90 tablet 3    aspirin (ECOTRIN LOW STRENGTH) 81 mg EC tablet Take 1 tablet (81 mg total) by mouth daily      atorvastatin (LIPITOR) 40 mg tablet TAKE 1 TABLET(40 MG) BY MOUTH DAILY 90 tablet 5    fluticasone (FLONASE) 50 mcg/act nasal spray INSTILL 2 SPRAYS INTO EACH NOSTRIL DAILY 16 mL 0    loratadine (CLARITIN) 10 mg tablet Take 1 tablet (10 mg total) by mouth daily 30 tablet 1    Multiple Vitamins-Minerals (CENTRUM ULTRA MENS) TABS Take by mouth      triamcinolone (KENALOG) 0 1 % cream Apply topically 2 (two) times a day 30 g 0     No current facility-administered medications for this visit  He is allergic to naproxen       Review of Systems   Constitutional: Negative  HENT: Negative  Eyes: Negative  Respiratory: Negative  Cardiovascular: Negative  Gastrointestinal: Negative  Endocrine: Negative  Genitourinary: Negative  Musculoskeletal: Positive for arthralgias  Skin: Positive for rash  Allergic/Immunologic: Negative  Neurological: Negative  Hematological: Negative  Psychiatric/Behavioral: Negative  Objective:      /80   Pulse 90   Temp 98 7 °F (37 1 °C)   Ht 5' 10" (1 778 m)   Wt 88 5 kg (195 lb)   SpO2 98%   BMI 27 98 kg/m²          Physical Exam  Vitals signs reviewed  Constitutional:       Appearance: He is well-developed  HENT:      Head: Normocephalic and atraumatic  Right Ear: Tympanic membrane, ear canal and external ear normal       Left Ear: Tympanic membrane, ear canal and external ear normal    Eyes:      General: No scleral icterus  Extraocular Movements: Extraocular movements intact  Conjunctiva/sclera: Conjunctivae normal       Pupils: Pupils are equal, round, and reactive to light  Neck:      Musculoskeletal: Normal range of motion and neck supple  No muscular tenderness  Thyroid: No thyromegaly  Vascular: No carotid bruit  Cardiovascular:      Rate and Rhythm: Normal rate and regular rhythm  Heart sounds: Normal heart sounds  No murmur  Comments: Poor pedal pulses  Pulmonary:      Effort: Pulmonary effort is normal       Breath sounds: Normal breath sounds  Abdominal:      General: Bowel sounds are normal  There is no distension  Palpations: Abdomen is soft  There is no mass  Tenderness: There is no abdominal tenderness  Musculoskeletal: Normal range of motion  General: Swelling (mild R knee effusion  ) present  No tenderness or deformity  Right lower leg: No edema  Left lower leg: No edema  Lymphadenopathy:      Cervical: No cervical adenopathy  Skin:     General: Skin is warm and dry  Capillary Refill: Capillary refill takes less than 2 seconds  Findings: Rash (eczematous dermatitis over the lateral aspect of the L ankle) present  Neurological:      Mental Status: He is alert and oriented to person, place, and time  Cranial Nerves: No cranial nerve deficit  Sensory: No sensory deficit  Motor: No weakness  Psychiatric:         Mood and Affect: Mood normal          Behavior: Behavior normal          Thought Content:  Thought content normal          Judgment: Judgment normal

## 2021-01-20 ENCOUNTER — APPOINTMENT (OUTPATIENT)
Dept: LAB | Facility: CLINIC | Age: 71
End: 2021-01-20
Payer: MEDICARE

## 2021-01-20 ENCOUNTER — TRANSCRIBE ORDERS (OUTPATIENT)
Dept: LAB | Facility: CLINIC | Age: 71
End: 2021-01-20

## 2021-01-20 DIAGNOSIS — I10 ESSENTIAL HYPERTENSION: ICD-10-CM

## 2021-01-20 DIAGNOSIS — J44.9 MODERATE COPD (CHRONIC OBSTRUCTIVE PULMONARY DISEASE) (HCC): ICD-10-CM

## 2021-01-20 DIAGNOSIS — I70.213 ATHEROSCLEROSIS OF NATIVE ARTERIES OF EXTREMITIES WITH INTERMITTENT CLAUDICATION, BILATERAL LEGS (HCC): ICD-10-CM

## 2021-01-20 DIAGNOSIS — R73.9 HYPERGLYCEMIA: ICD-10-CM

## 2021-01-20 DIAGNOSIS — Z12.5 SCREENING FOR PROSTATE CANCER: ICD-10-CM

## 2021-01-20 LAB
ALBUMIN SERPL BCP-MCNC: 4 G/DL (ref 3.5–5)
ALP SERPL-CCNC: 88 U/L (ref 46–116)
ALT SERPL W P-5'-P-CCNC: 58 U/L (ref 12–78)
ANION GAP SERPL CALCULATED.3IONS-SCNC: 8 MMOL/L (ref 4–13)
AST SERPL W P-5'-P-CCNC: 38 U/L (ref 5–45)
BASOPHILS # BLD AUTO: 0.02 THOUSANDS/ΜL (ref 0–0.1)
BASOPHILS NFR BLD AUTO: 1 % (ref 0–1)
BILIRUB SERPL-MCNC: 0.79 MG/DL (ref 0.2–1)
BUN SERPL-MCNC: 12 MG/DL (ref 5–25)
CALCIUM SERPL-MCNC: 8.8 MG/DL (ref 8.3–10.1)
CHLORIDE SERPL-SCNC: 104 MMOL/L (ref 100–108)
CHOLEST SERPL-MCNC: 130 MG/DL (ref 50–200)
CO2 SERPL-SCNC: 27 MMOL/L (ref 21–32)
CREAT SERPL-MCNC: 0.89 MG/DL (ref 0.6–1.3)
EOSINOPHIL # BLD AUTO: 0.05 THOUSAND/ΜL (ref 0–0.61)
EOSINOPHIL NFR BLD AUTO: 2 % (ref 0–6)
ERYTHROCYTE [DISTWIDTH] IN BLOOD BY AUTOMATED COUNT: 14 % (ref 11.6–15.1)
GFR SERPL CREATININE-BSD FRML MDRD: 87 ML/MIN/1.73SQ M
GLUCOSE P FAST SERPL-MCNC: 161 MG/DL (ref 65–99)
HCT VFR BLD AUTO: 43.5 % (ref 36.5–49.3)
HDLC SERPL-MCNC: 43 MG/DL
HGB BLD-MCNC: 14.3 G/DL (ref 12–17)
IMM GRANULOCYTES # BLD AUTO: 0.01 THOUSAND/UL (ref 0–0.2)
IMM GRANULOCYTES NFR BLD AUTO: 0 % (ref 0–2)
LDLC SERPL CALC-MCNC: 39 MG/DL (ref 0–100)
LYMPHOCYTES # BLD AUTO: 0.67 THOUSANDS/ΜL (ref 0.6–4.47)
LYMPHOCYTES NFR BLD AUTO: 21 % (ref 14–44)
MCH RBC QN AUTO: 28.1 PG (ref 26.8–34.3)
MCHC RBC AUTO-ENTMCNC: 32.9 G/DL (ref 31.4–37.4)
MCV RBC AUTO: 86 FL (ref 82–98)
MONOCYTES # BLD AUTO: 0.66 THOUSAND/ΜL (ref 0.17–1.22)
MONOCYTES NFR BLD AUTO: 20 % (ref 4–12)
NEUTROPHILS # BLD AUTO: 1.86 THOUSANDS/ΜL (ref 1.85–7.62)
NEUTS SEG NFR BLD AUTO: 56 % (ref 43–75)
NONHDLC SERPL-MCNC: 87 MG/DL
NRBC BLD AUTO-RTO: 0 /100 WBCS
PLATELET # BLD AUTO: 167 THOUSANDS/UL (ref 149–390)
PMV BLD AUTO: 10.7 FL (ref 8.9–12.7)
POTASSIUM SERPL-SCNC: 3.8 MMOL/L (ref 3.5–5.3)
PROT SERPL-MCNC: 7.5 G/DL (ref 6.4–8.2)
PSA SERPL-MCNC: 1 NG/ML (ref 0–4)
RBC # BLD AUTO: 5.09 MILLION/UL (ref 3.88–5.62)
SODIUM SERPL-SCNC: 139 MMOL/L (ref 136–145)
TRIGL SERPL-MCNC: 238 MG/DL
WBC # BLD AUTO: 3.27 THOUSAND/UL (ref 4.31–10.16)

## 2021-01-20 PROCEDURE — 80061 LIPID PANEL: CPT

## 2021-01-20 PROCEDURE — 83036 HEMOGLOBIN GLYCOSYLATED A1C: CPT

## 2021-01-20 PROCEDURE — 36415 COLL VENOUS BLD VENIPUNCTURE: CPT

## 2021-01-20 PROCEDURE — 80053 COMPREHEN METABOLIC PANEL: CPT

## 2021-01-20 PROCEDURE — 85025 COMPLETE CBC W/AUTO DIFF WBC: CPT

## 2021-01-20 PROCEDURE — G0103 PSA SCREENING: HCPCS

## 2021-01-21 DIAGNOSIS — R73.9 HYPERGLYCEMIA: Primary | ICD-10-CM

## 2021-01-21 LAB
EST. AVERAGE GLUCOSE BLD GHB EST-MCNC: 146 MG/DL
HBA1C MFR BLD: 6.7 %

## 2021-01-21 NOTE — ASSESSMENT & PLAN NOTE
Some intermittent swelling  Continue Tylenol p r n     Avoid exacerbating positions in activities    Recheck 6 months-earlier if worse

## 2021-01-21 NOTE — ASSESSMENT & PLAN NOTE
Lungs are clear  Urged smoking cessation  Refer for CT of the lungs-lung cancer screening    Recheck 6 months

## 2021-01-21 NOTE — ASSESSMENT & PLAN NOTE
Poor pulses on exam though patient is asymptomatic  Continue to monitor  Continue atorvastatin  Follow-up with vascular surgery    Recheck 6 months

## 2021-02-10 ENCOUNTER — OFFICE VISIT (OUTPATIENT)
Dept: FAMILY MEDICINE CLINIC | Facility: CLINIC | Age: 71
End: 2021-02-10
Payer: MEDICARE

## 2021-02-10 VITALS
HEART RATE: 82 BPM | HEIGHT: 70 IN | TEMPERATURE: 98.4 F | DIASTOLIC BLOOD PRESSURE: 84 MMHG | WEIGHT: 198 LBS | BODY MASS INDEX: 28.35 KG/M2 | SYSTOLIC BLOOD PRESSURE: 138 MMHG

## 2021-02-10 DIAGNOSIS — I10 ESSENTIAL HYPERTENSION: ICD-10-CM

## 2021-02-10 DIAGNOSIS — Z79.4 TYPE 2 DIABETES MELLITUS WITHOUT COMPLICATION, WITH LONG-TERM CURRENT USE OF INSULIN (HCC): Primary | ICD-10-CM

## 2021-02-10 DIAGNOSIS — E11.9 TYPE 2 DIABETES MELLITUS WITHOUT COMPLICATION, WITH LONG-TERM CURRENT USE OF INSULIN (HCC): Primary | ICD-10-CM

## 2021-02-10 DIAGNOSIS — I77.1 BILATERAL ILIAC ARTERY STENOSIS (HCC): ICD-10-CM

## 2021-02-10 PROCEDURE — 99214 OFFICE O/P EST MOD 30 MIN: CPT | Performed by: FAMILY MEDICINE

## 2021-02-10 NOTE — PROGRESS NOTES
Assessment/Plan:    Type 2 diabetes mellitus without complication, with long-term current use of insulin (HCC)    Lab Results   Component Value Date    HGBA1C 6 7 (H) 01/20/2021   I reviewed with pt  He refused referral to DM education classes  He will check with insurance re: covering home BG monitors  Refer for vision test  BMI Counseling: Body mass index is 28 41 kg/m²  The BMI is above normal  Nutrition recommendations include reducing portion sizes, decreasing overall calorie intake, consuming healthier snacks, moderation in carbohydrate intake, increasing intake of lean protein, reducing intake of saturated fat and trans fat and reducing intake of cholesterol  Exercise recommendations include exercising 3-5 times per week  Recheck 3m    Hypertension  I would like to see this lower  Pt will work on diet and weight loss  Recheck 3m    Bilateral iliac artery stenosis (HCC)  Discussed need for BG control as part of treatment strategy for PAD  Continue to monitor  Increase exercise  Recheck 3m       Diagnoses and all orders for this visit:    Type 2 diabetes mellitus without complication, with long-term current use of insulin (HCC)  -     Hemoglobin A1C; Future  -     Basic metabolic panel; Future  -     Microalbumin / creatinine urine ratio; Future    Essential hypertension    Bilateral iliac artery stenosis (HCC)          Subjective:      Patient ID: Elfego Trujillo is a 80 y o  male  Pt here to discuss recent labs  - pt with fasting BG >160 on recent labs  Reflex A1C = 6 7  Pt notified and has been working diet since becoming aware  Wife is diabetic and pt feels he understands the diet well  He has cut back on soda (was drinking one large bottle a day) and is working on decreasing carbs (pasta)  He is not exercising regularly   He denies increased thirst or urination, and denies change in vision    - Denies CP, palpitations, lightheadedness or other CV symptoms with or without exertion  - no GI or  complaints  - denies paresthesias/numbness in extremities      The following portions of the patient's history were reviewed and updated as appropriate:   He  has a past medical history of Colon polyp, COPD (chronic obstructive pulmonary disease) ([place] [address] ), Coronary artery disease, GERD (gastroesophageal reflux disease), Hyperlipidemia, Hypertension, PVD (peripheral vascular disease) ([place] [address] ), and Seasonal allergies  He   Patient Active Problem List    Diagnosis Date Noted    Type 2 diabetes mellitus without complication, with long-term current use of insulin ([place] [address] ) 02/10/2021    Lung nodule, solitary 07/13/2020    Aortic sclerosis 07/13/2020    Skin cancer, basal cell 09/23/2019    Chronic pain of right knee 07/24/2019    Dyslipidemia 06/17/2019    Stenosis of left renal artery (HCC) 06/17/2019    Benign paroxysmal positional vertigo 05/15/2019    Allergic rhinitis 05/15/2019    Moderate COPD (chronic obstructive pulmonary disease) ([place] [address] ) 11/20/2018    Atherosclerosis of native arteries of extremities with intermittent claudication, bilateral legs ([place] [address] ) 05/08/2017    Bilateral iliac artery stenosis (HCC) 05/08/2017    Abdominal aortic stenosis 05/08/2017    Fatigue 01/21/2013    Hypertension 01/21/2013     He  has a past surgical history that includes Colonoscopy (2008); Cardiac surgery (2012); Knee arthroscopy (Bilateral); Shoulder surgery (Right); Tonsillectomy; Appendectomy (2016); and pr colonoscopy flx dx w/collj spec when pfrmd (N/A, 12/20/2018)  He  reports that he has been smoking  He has a 12 50 pack-year smoking history  He has never used smokeless tobacco  He reports that he does not drink alcohol or use drugs    Current Outpatient Medications   Medication Sig Dispense Refill    amLODIPine (NORVASC) 2 5 mg tablet TAKE 1 TABLET(2 5 MG) BY MOUTH DAILY 90 tablet 3    aspirin (ECOTRIN LOW STRENGTH) 81 mg EC tablet Take 1 tablet (81 mg total) by mouth daily      atorvastatin (LIPITOR) 40 mg tablet TAKE 1 TABLET(40 MG) BY MOUTH DAILY 90 tablet 5    fluticasone (FLONASE) 50 mcg/act nasal spray INSTILL 2 SPRAYS INTO EACH NOSTRIL DAILY 16 mL 0    loratadine (CLARITIN) 10 mg tablet Take 1 tablet (10 mg total) by mouth daily 30 tablet 1    Multiple Vitamins-Minerals (CENTRUM ULTRA MENS) TABS Take by mouth      triamcinolone (KENALOG) 0 1 % cream Apply topically 2 (two) times a day 30 g 0     No current facility-administered medications for this visit  He is allergic to naproxen       Review of Systems   Constitutional: Negative  HENT: Negative  Eyes: Negative  Respiratory: Negative  Cardiovascular: Negative  Gastrointestinal: Negative  Endocrine: Negative  Genitourinary: Negative  Musculoskeletal: Negative  Skin: Negative  Allergic/Immunologic: Negative  Neurological: Negative  Hematological: Negative  Psychiatric/Behavioral: Negative  Objective:      /84   Pulse 82   Temp 98 4 °F (36 9 °C)   Ht 5' 10" (1 778 m)   Wt 89 8 kg (198 lb)   BMI 28 41 kg/m²          Physical Exam  Vitals signs reviewed  Constitutional:       Appearance: He is well-developed  HENT:      Head: Normocephalic and atraumatic  Right Ear: Tympanic membrane, ear canal and external ear normal       Left Ear: Tympanic membrane, ear canal and external ear normal    Eyes:      General: No scleral icterus  Extraocular Movements: Extraocular movements intact  Conjunctiva/sclera: Conjunctivae normal       Pupils: Pupils are equal, round, and reactive to light  Neck:      Musculoskeletal: Normal range of motion and neck supple  No muscular tenderness  Thyroid: No thyromegaly  Vascular: No carotid bruit  Cardiovascular:      Rate and Rhythm: Normal rate and regular rhythm  Pulses: no weak pulses          Posterior tibial pulses are 1+ on the right side and 1+ on the left side  Heart sounds: Normal heart sounds  No murmur     Pulmonary:      Effort: Pulmonary effort is normal       Breath sounds: Normal breath sounds  Abdominal:      General: Bowel sounds are normal  There is no distension  Palpations: Abdomen is soft  There is no mass  Tenderness: There is no abdominal tenderness  Musculoskeletal: Normal range of motion  General: No swelling  Right lower leg: No edema  Left lower leg: No edema  Feet:      Right foot:      Skin integrity: No ulcer, skin breakdown, erythema, warmth, callus or dry skin  Left foot:      Skin integrity: No ulcer, skin breakdown, erythema, warmth, callus or dry skin  Lymphadenopathy:      Cervical: No cervical adenopathy  Skin:     General: Skin is warm and dry  Capillary Refill: Capillary refill takes less than 2 seconds  Neurological:      Mental Status: He is alert and oriented to person, place, and time  Cranial Nerves: No cranial nerve deficit  Sensory: No sensory deficit  Motor: No weakness  Psychiatric:         Mood and Affect: Mood normal       Comments:            Patient's shoes and socks removed  Right Foot/Ankle   Right Foot Inspection  Skin Exam: skin normal and skin intact no dry skin, no warmth, no callus, no erythema, no maceration, no abnormal color, no pre-ulcer, no ulcer and no callus                          Toe Exam: ROM and strength within normal limits  Sensory   Vibration: intact    Monofilament testing: intact  Vascular  Capillary refills: < 3 seconds  The right PT pulse is 1+  Left Foot/Ankle  Left Foot Inspection  Skin Exam: skin normal and skin intactno dry skin, no warmth, no erythema, no maceration, normal color, no pre-ulcer, no ulcer and no callus                         Toe Exam: ROM and strength within normal limits                   Sensory   Vibration: intact    Monofilament: intact  Vascular  Capillary refills: < 3 seconds  The left PT pulse is 1+  Assign Risk Category:  No deformity present;  No loss of protective sensation; No weak pulses       Risk: 0

## 2021-02-10 NOTE — ASSESSMENT & PLAN NOTE
Lab Results   Component Value Date    HGBA1C 6 7 (H) 01/20/2021   I reviewed with pt  He refused referral to DM education classes  He will check with insurance re: covering home BG monitors  Refer for vision test  BMI Counseling: Body mass index is 28 41 kg/m²  The BMI is above normal  Nutrition recommendations include reducing portion sizes, decreasing overall calorie intake, consuming healthier snacks, moderation in carbohydrate intake, increasing intake of lean protein, reducing intake of saturated fat and trans fat and reducing intake of cholesterol  Exercise recommendations include exercising 3-5 times per week    Recheck 3m

## 2021-02-11 NOTE — ASSESSMENT & PLAN NOTE
Discussed need for BG control as part of treatment strategy for PAD  Continue to monitor  Increase exercise   Recheck 3m

## 2021-03-27 ENCOUNTER — IMMUNIZATIONS (OUTPATIENT)
Dept: FAMILY MEDICINE CLINIC | Facility: HOSPITAL | Age: 71
End: 2021-03-27

## 2021-03-27 DIAGNOSIS — Z23 ENCOUNTER FOR IMMUNIZATION: Primary | ICD-10-CM

## 2021-03-27 PROCEDURE — 0001A SARS-COV-2 / COVID-19 MRNA VACCINE (PFIZER-BIONTECH) 30 MCG: CPT

## 2021-03-27 PROCEDURE — 91300 SARS-COV-2 / COVID-19 MRNA VACCINE (PFIZER-BIONTECH) 30 MCG: CPT

## 2021-04-17 ENCOUNTER — IMMUNIZATIONS (OUTPATIENT)
Dept: FAMILY MEDICINE CLINIC | Facility: HOSPITAL | Age: 71
End: 2021-04-17

## 2021-04-17 DIAGNOSIS — Z23 ENCOUNTER FOR IMMUNIZATION: Primary | ICD-10-CM

## 2021-04-17 PROCEDURE — 91300 SARS-COV-2 / COVID-19 MRNA VACCINE (PFIZER-BIONTECH) 30 MCG: CPT

## 2021-04-17 PROCEDURE — 0002A SARS-COV-2 / COVID-19 MRNA VACCINE (PFIZER-BIONTECH) 30 MCG: CPT

## 2021-05-22 ENCOUNTER — APPOINTMENT (OUTPATIENT)
Dept: LAB | Facility: HOSPITAL | Age: 71
End: 2021-05-22
Payer: MEDICARE

## 2021-05-22 ENCOUNTER — TRANSCRIBE ORDERS (OUTPATIENT)
Dept: LAB | Facility: HOSPITAL | Age: 71
End: 2021-05-22

## 2021-05-22 DIAGNOSIS — E11.9 TYPE 2 DIABETES MELLITUS WITHOUT COMPLICATION, WITH LONG-TERM CURRENT USE OF INSULIN (HCC): ICD-10-CM

## 2021-05-22 DIAGNOSIS — Z79.4 TYPE 2 DIABETES MELLITUS WITHOUT COMPLICATION, WITH LONG-TERM CURRENT USE OF INSULIN (HCC): ICD-10-CM

## 2021-05-22 LAB
ANION GAP SERPL CALCULATED.3IONS-SCNC: 6 MMOL/L (ref 4–13)
BUN SERPL-MCNC: 14 MG/DL (ref 6–20)
CALCIUM SERPL-MCNC: 9.2 MG/DL (ref 8.4–10.2)
CHLORIDE SERPL-SCNC: 105 MMOL/L (ref 96–108)
CO2 SERPL-SCNC: 29 MMOL/L (ref 22–33)
CREAT SERPL-MCNC: 0.8 MG/DL (ref 0.5–1.2)
CREAT UR-MCNC: 170 MG/DL
EST. AVERAGE GLUCOSE BLD GHB EST-MCNC: 120 MG/DL
GFR SERPL CREATININE-BSD FRML MDRD: 91 ML/MIN/1.73SQ M
GLUCOSE P FAST SERPL-MCNC: 152 MG/DL (ref 70–105)
HBA1C MFR BLD: 5.8 %
MICROALBUMIN UR-MCNC: 17.1 MG/L (ref 0–20)
MICROALBUMIN/CREAT 24H UR: 10 MG/G CREATININE (ref 0–30)
POTASSIUM SERPL-SCNC: 4 MMOL/L (ref 3.5–5)
SODIUM SERPL-SCNC: 140 MMOL/L (ref 133–145)

## 2021-05-22 PROCEDURE — 80048 BASIC METABOLIC PNL TOTAL CA: CPT

## 2021-05-22 PROCEDURE — 36415 COLL VENOUS BLD VENIPUNCTURE: CPT

## 2021-05-22 PROCEDURE — 82043 UR ALBUMIN QUANTITATIVE: CPT

## 2021-05-22 PROCEDURE — 83036 HEMOGLOBIN GLYCOSYLATED A1C: CPT

## 2021-05-22 PROCEDURE — 82570 ASSAY OF URINE CREATININE: CPT

## 2021-05-25 ENCOUNTER — OFFICE VISIT (OUTPATIENT)
Dept: FAMILY MEDICINE CLINIC | Facility: CLINIC | Age: 71
End: 2021-05-25
Payer: MEDICARE

## 2021-05-25 VITALS
HEIGHT: 70 IN | SYSTOLIC BLOOD PRESSURE: 120 MMHG | HEART RATE: 78 BPM | OXYGEN SATURATION: 95 % | TEMPERATURE: 97.5 F | DIASTOLIC BLOOD PRESSURE: 70 MMHG | WEIGHT: 181.6 LBS | BODY MASS INDEX: 26 KG/M2

## 2021-05-25 DIAGNOSIS — E78.5 DYSLIPIDEMIA: ICD-10-CM

## 2021-05-25 DIAGNOSIS — Z79.4 TYPE 2 DIABETES MELLITUS WITHOUT COMPLICATION, WITH LONG-TERM CURRENT USE OF INSULIN (HCC): Primary | ICD-10-CM

## 2021-05-25 DIAGNOSIS — J44.9 MODERATE COPD (CHRONIC OBSTRUCTIVE PULMONARY DISEASE) (HCC): ICD-10-CM

## 2021-05-25 DIAGNOSIS — E11.9 TYPE 2 DIABETES MELLITUS WITHOUT COMPLICATION, WITH LONG-TERM CURRENT USE OF INSULIN (HCC): Primary | ICD-10-CM

## 2021-05-25 DIAGNOSIS — I10 ESSENTIAL HYPERTENSION: ICD-10-CM

## 2021-05-25 PROCEDURE — 99214 OFFICE O/P EST MOD 30 MIN: CPT | Performed by: FAMILY MEDICINE

## 2021-05-25 NOTE — PROGRESS NOTES
Assessment/Plan:    Type 2 diabetes mellitus without complication, with long-term current use of insulin ([facility])    Lab Results   Component Value Date    HGBA1C 5 8 (H) 05/22/2021     Improved control  Continue present care  Urged diet control  Recheck 6m    Moderate COPD (chronic obstructive pulmonary disease) ([facility])  Stable  Continue to monitor  Recheck 6m    Hypertension  Well controlled  Cont present treatment  Monitor labs  Recheck 6m      Dyslipidemia  Has been at goal  Check labs  Continue present care  Recheck labs  F/u 6m       Diagnoses and all orders for this visit:    Type 2 diabetes mellitus without complication, with long-term current use of insulin ([facility])  -     Comprehensive metabolic panel; Future  -     Hemoglobin A1C; Future  -     Lipid panel; Future    Essential hypertension  -     CBC and differential; Future    Moderate COPD (chronic obstructive pulmonary disease) ([facility])    Dyslipidemia          Subjective:      Patient ID: Khalida Stokes is a 80 y o  male  f/u multiple med issues  - pt is feeling well  - pt has improved his diet and achieve d some weight loss  BGs have improved with recent A1C = 5 8  Knees and back feel betterwith weight loss  - some increased stress recently  Pt's wife recently underwent cardiac cath and stent placement  She is doing well s/p procedure and pt notes stress has decreased  - pt is not exercising regularly but is active  Denies CP, palpitations, lightheadedness or other CV symptoms with or without exertion  - breathing is stable despite higher pollen counts   No worsening wheeze or SOB  - no GI or  complaints        The following portions of the patient's history were reviewed and updated as appropriate:   He  has a past medical history of Colon polyp, COPD (chronic obstructive pulmonary disease) ([facility] [address] ), Coronary artery disease, GERD (gastroesophageal reflux disease), Hyperlipidemia, Hypertension, PVD (peripheral vascular disease) ([facility] [address] ), and Seasonal allergies  He   Patient Active Problem List    Diagnosis Date Noted    Type 2 diabetes mellitus without complication, with long-term current use of insulin ([place] [address] ) 02/10/2021    Aortic sclerosis 07/13/2020    Skin cancer, basal cell 09/23/2019    Chronic pain of right knee 07/24/2019    Dyslipidemia 06/17/2019    Stenosis of left renal artery (HCC) 06/17/2019    Benign paroxysmal positional vertigo 05/15/2019    Allergic rhinitis 05/15/2019    Moderate COPD (chronic obstructive pulmonary disease) ([place] [address] ) 11/20/2018    Atherosclerosis of native arteries of extremities with intermittent claudication, bilateral legs ([place] [address] ) 05/08/2017    Bilateral iliac artery stenosis (HCC) 05/08/2017    Abdominal aortic stenosis 05/08/2017    Fatigue 01/21/2013    Hypertension 01/21/2013     He  has a past surgical history that includes Colonoscopy (2008); Cardiac surgery (2012); Knee arthroscopy (Bilateral); Shoulder surgery (Right); Tonsillectomy; Appendectomy (2016); and pr colonoscopy flx dx w/collj spec when pfrmd (N/A, 12/20/2018)  He  reports that he has been smoking  He has a 12 50 pack-year smoking history  He has never used smokeless tobacco  He reports that he does not drink alcohol or use drugs    Current Outpatient Medications   Medication Sig Dispense Refill    amLODIPine (NORVASC) 2 5 mg tablet TAKE 1 TABLET(2 5 MG) BY MOUTH DAILY 90 tablet 3    aspirin (ECOTRIN LOW STRENGTH) 81 mg EC tablet Take 1 tablet (81 mg total) by mouth daily      atorvastatin (LIPITOR) 40 mg tablet TAKE 1 TABLET(40 MG) BY MOUTH DAILY 90 tablet 5    fluticasone (FLONASE) 50 mcg/act nasal spray INSTILL 2 SPRAYS INTO EACH NOSTRIL DAILY 16 mL 0    loratadine (CLARITIN) 10 mg tablet Take 1 tablet (10 mg total) by mouth daily 30 tablet 1    Multiple Vitamins-Minerals (CENTRUM ULTRA MENS) TABS Take by mouth      triamcinolone (KENALOG) 0 1 % cream Apply topically 2 (two) times a day 30 g 0     No current facility-administered medications for this visit  He is allergic to naproxen       Review of Systems   Constitutional: Negative  HENT: Negative  Eyes: Negative  Respiratory: Negative  Cardiovascular: Negative  Gastrointestinal: Negative  Endocrine: Negative  Genitourinary: Negative  Musculoskeletal: Negative  Skin: Negative  Allergic/Immunologic: Negative  Neurological: Negative  Hematological: Negative  Psychiatric/Behavioral: Negative  Objective:      /70   Pulse 78   Temp 97 5 °F (36 4 °C)   Ht 5' 10" (1 778 m)   Wt 82 4 kg (181 lb 9 6 oz)   SpO2 95%   BMI 26 06 kg/m²          Physical Exam  Vitals signs reviewed  Constitutional:       Appearance: He is well-developed  HENT:      Head: Normocephalic and atraumatic  Right Ear: Tympanic membrane, ear canal and external ear normal       Left Ear: Tympanic membrane, ear canal and external ear normal    Eyes:      General: No scleral icterus  Extraocular Movements: Extraocular movements intact  Conjunctiva/sclera: Conjunctivae normal       Pupils: Pupils are equal, round, and reactive to light  Neck:      Musculoskeletal: Normal range of motion and neck supple  No muscular tenderness  Thyroid: No thyromegaly  Vascular: No carotid bruit  Cardiovascular:      Rate and Rhythm: Normal rate and regular rhythm  Pulses: Normal pulses  Heart sounds: Normal heart sounds  No murmur  Pulmonary:      Effort: Pulmonary effort is normal       Breath sounds: Normal breath sounds  Abdominal:      General: Bowel sounds are normal  There is no distension  Palpations: Abdomen is soft  There is no mass  Tenderness: There is no abdominal tenderness  Musculoskeletal: Normal range of motion  General: Deformity (mildly arthritic changes in hands) present  No swelling or tenderness  Right lower leg: No edema  Left lower leg: No edema     Lymphadenopathy:      Cervical: No cervical adenopathy  Skin:     General: Skin is warm and dry  Capillary Refill: Capillary refill takes less than 2 seconds  Neurological:      Mental Status: He is alert and oriented to person, place, and time  Cranial Nerves: No cranial nerve deficit  Sensory: No sensory deficit  Motor: No weakness  Gait: Gait normal    Psychiatric:         Mood and Affect: Mood normal          Behavior: Behavior normal          Thought Content:  Thought content normal          Judgment: Judgment normal

## 2021-05-26 PROBLEM — R91.1 LUNG NODULE, SOLITARY: Status: RESOLVED | Noted: 2020-07-13 | Resolved: 2021-05-26

## 2021-05-26 NOTE — ASSESSMENT & PLAN NOTE
Lab Results   Component Value Date    HGBA1C 5 8 (H) 05/22/2021     Improved control  Continue present care  Urged diet control   Recheck 6m

## 2021-07-26 ENCOUNTER — OFFICE VISIT (OUTPATIENT)
Dept: FAMILY MEDICINE CLINIC | Facility: CLINIC | Age: 71
End: 2021-07-26
Payer: MEDICARE

## 2021-07-26 VITALS
WEIGHT: 182 LBS | BODY MASS INDEX: 26.05 KG/M2 | SYSTOLIC BLOOD PRESSURE: 126 MMHG | HEART RATE: 80 BPM | HEIGHT: 70 IN | DIASTOLIC BLOOD PRESSURE: 76 MMHG | TEMPERATURE: 97.9 F

## 2021-07-26 DIAGNOSIS — Z79.4 TYPE 2 DIABETES MELLITUS WITHOUT COMPLICATION, WITH LONG-TERM CURRENT USE OF INSULIN (HCC): ICD-10-CM

## 2021-07-26 DIAGNOSIS — E11.9 TYPE 2 DIABETES MELLITUS WITHOUT COMPLICATION, WITH LONG-TERM CURRENT USE OF INSULIN (HCC): ICD-10-CM

## 2021-07-26 DIAGNOSIS — J44.9 MODERATE COPD (CHRONIC OBSTRUCTIVE PULMONARY DISEASE) (HCC): ICD-10-CM

## 2021-07-26 DIAGNOSIS — I77.1 BILATERAL ILIAC ARTERY STENOSIS (HCC): ICD-10-CM

## 2021-07-26 DIAGNOSIS — Z00.00 MEDICARE ANNUAL WELLNESS VISIT, SUBSEQUENT: Primary | ICD-10-CM

## 2021-07-26 DIAGNOSIS — I10 ESSENTIAL HYPERTENSION: ICD-10-CM

## 2021-07-26 PROCEDURE — 99214 OFFICE O/P EST MOD 30 MIN: CPT | Performed by: FAMILY MEDICINE

## 2021-07-26 PROCEDURE — 1123F ACP DISCUSS/DSCN MKR DOCD: CPT | Performed by: FAMILY MEDICINE

## 2021-07-26 PROCEDURE — G0439 PPPS, SUBSEQ VISIT: HCPCS | Performed by: FAMILY MEDICINE

## 2021-07-26 NOTE — PATIENT INSTRUCTIONS
Medicare Preventive Visit Patient Instructions  Thank you for completing your Welcome to Medicare Visit or Medicare Annual Wellness Visit today  Your next wellness visit will be due in one year (7/27/2022)  The screening/preventive services that you may require over the next 5-10 years are detailed below  Some tests may not apply to you based off risk factors and/or age  Screening tests ordered at today's visit but not completed yet may show as past due  Also, please note that scanned in results may not display below  Preventive Screenings:  Service Recommendations Previous Testing/Comments   Colorectal Cancer Screening  · Colonoscopy    · Fecal Occult Blood Test (FOBT)/Fecal Immunochemical Test (FIT)  · Fecal DNA/Cologuard Test  · Flexible Sigmoidoscopy Age: 54-65 years old   Colonoscopy: every 10 years (May be performed more frequently if at higher risk)  OR  FOBT/FIT: every 1 year  OR  Cologuard: every 3 years  OR  Sigmoidoscopy: every 5 years  Screening may be recommended earlier than age 48 if at higher risk for colorectal cancer  Also, an individualized decision between you and your healthcare provider will decide whether screening between the ages of 74-80 would be appropriate   Colonoscopy: 12/20/2018  FOBT/FIT: Not on file  Cologuard: Not on file  Sigmoidoscopy: Not on file    Screening Current     Prostate Cancer Screening Individualized decision between patient and health care provider in men between ages of 53-78   Medicare will cover every 12 months beginning on the day after your 50th birthday PSA: 1 0 ng/mL     Screening Current     Hepatitis C Screening Once for adults born between 1945 and 1965  More frequently in patients at high risk for Hepatitis C Hep C Antibody: Not on file        Diabetes Screening 1-2 times per year if you're at risk for diabetes or have pre-diabetes Fasting glucose: 152 mg/dL   A1C: 5 8 %    Screening Not Indicated  History Diabetes   Cholesterol Screening Once every 5 years if you don't have a lipid disorder  May order more often based on risk factors  Lipid panel: 01/20/2021    Screening Current      Other Preventive Screenings Covered by Medicare:  1  Abdominal Aortic Aneurysm (AAA) Screening: covered once if your at risk  You're considered to be at risk if you have a family history of AAA or a male between the age of 73-68 who smoking at least 100 cigarettes in your lifetime  2  Lung Cancer Screening: covers low dose CT scan once per year if you meet all of the following conditions: (1) Age 50-69; (2) No signs or symptoms of lung cancer; (3) Current smoker or have quit smoking within the last 15 years; (4) You have a tobacco smoking history of at least 30 pack years (packs per day x number of years you smoked); (5) You get a written order from a healthcare provider  3  Glaucoma Screening: covered annually if you're considered high risk: (1) You have diabetes OR (2) Family history of glaucoma OR (3)  aged 48 and older OR (3)  American aged 72 and older  3  Osteoporosis Screening: covered every 2 years if you meet one of the following conditions: (1) Have a vertebral abnormality; (2) On glucocorticoid therapy for more than 3 months; (3) Have primary hyperparathyroidism; (4) On osteoporosis medications and need to assess response to drug therapy  5  HIV Screening: covered annually if you're between the age of 12-76  Also covered annually if you are younger than 13 and older than 72 with risk factors for HIV infection  For pregnant patients, it is covered up to 3 times per pregnancy      Immunizations:  Immunization Recommendations   Influenza Vaccine Annual influenza vaccination during flu season is recommended for all persons aged >= 6 months who do not have contraindications   Pneumococcal Vaccine (Prevnar and Pneumovax)  * Prevnar = PCV13  * Pneumovax = PPSV23 Adults 25-60 years old: 1-3 doses may be recommended based on certain risk factors  Adults 72 years old: Prevnar (PCV13) vaccine recommended followed by Pneumovax (PPSV23) vaccine  If already received PPSV23 since turning 65, then PCV13 recommended at least one year after PPSV23 dose  Hepatitis B Vaccine 3 dose series if at intermediate or high risk (ex: diabetes, end stage renal disease, liver disease)   Tetanus (Td) Vaccine - COST NOT COVERED BY MEDICARE PART B Following completion of primary series, a booster dose should be given every 10 years to maintain immunity against tetanus  Td may also be given as tetanus wound prophylaxis  Tdap Vaccine - COST NOT COVERED BY MEDICARE PART B Recommended at least once for all adults  For pregnant patients, recommended with each pregnancy  Shingles Vaccine (Shingrix) - COST NOT COVERED BY MEDICARE PART B  2 shot series recommended in those aged 48 and above     Health Maintenance Due:      Topic Date Due    Hepatitis C Screening  Never done    Colorectal Cancer Screening  12/20/2021     Immunizations Due:      Topic Date Due    Pneumococcal Vaccine: 65+ Years (1 of 2 - PPSV23) Never done    DTaP,Tdap,and Td Vaccines (1 - Tdap) Never done     Advance Directives   What are advance directives? Advance directives are legal documents that state your wishes and plans for medical care  These plans are made ahead of time in case you lose your ability to make decisions for yourself  Advance directives can apply to any medical decision, such as the treatments you want, and if you want to donate organs  What are the types of advance directives? There are many types of advance directives, and each state has rules about how to use them  You may choose a combination of any of the following:  · Living will: This is a written record of the treatment you want  You can also choose which treatments you do not want, which to limit, and which to stop at a certain time  This includes surgery, medicine, IV fluid, and tube feedings     · Durable power of  for [place] [facility]): This is a written record that states who you want to make healthcare choices for you when you are unable to make them for yourself  This person, called a proxy, is usually a family member or a friend  You may choose more than 1 proxy  · Do not resuscitate (DNR) order:  A DNR order is used in case your heart stops beating or you stop breathing  It is a request not to have certain forms of treatment, such as CPR  A DNR order may be included in other types of advance directives  · Medical directive: This covers the care that you want if you are in a coma, near death, or unable to make decisions for yourself  You can list the treatments you want for each condition  Treatment may include pain medicine, surgery, blood transfusions, dialysis, IV or tube feedings, and a ventilator (breathing machine)  · Values history: This document has questions about your views, beliefs, and how you feel and think about life  This information can help others choose the care that you would choose  Why are advance directives important? An advance directive helps you control your care  Although spoken wishes may be used, it is better to have your wishes written down  Spoken wishes can be misunderstood, or not followed  Treatments may be given even if you do not want them  An advance directive may make it easier for your family to make difficult choices about your care  Cigarette Smoking and Your Health   Risks to your health if you smoke:  Nicotine and other chemicals found in tobacco damage every cell in your body  Even if you are a light smoker, you have an increased risk for cancer, heart disease, and lung disease  If you are pregnant or have diabetes, smoking increases your risk for complications  Benefits to your health if you stop smoking:   · You decrease respiratory symptoms such as coughing, wheezing, and shortness of breath     · You reduce your risk for cancers of the lung, mouth, throat, kidney, bladder, pancreas, stomach, and cervix  If you already have cancer, you increase the benefits of chemotherapy  You also reduce your risk for cancer returning or a second cancer from developing  · You reduce your risk for heart disease, blood clots, heart attack, and stroke  · You reduce your risk for lung infections, and diseases such as pneumonia, asthma, chronic bronchitis, and emphysema  · Your circulation improves  More oxygen can be delivered to your body  If you have diabetes, you lower your risk for complications, such as kidney, artery, and eye diseases  You also lower your risk for nerve damage  Nerve damage can lead to amputations, poor vision, and blindness  · You improve your body's ability to heal and to fight infections  For more information and support to stop smoking:   · [employer]  Phone: 8- 733 - 101-6018  Web Address: [place]  Weight Management   Why it is important to manage your weight:  Being overweight increases your risk of health conditions such as heart disease, high blood pressure, type 2 diabetes, and certain types of cancer  It can also increase your risk for osteoarthritis, sleep apnea, and other respiratory problems  Aim for a slow, steady weight loss  Even a small amount of weight loss can lower your risk of health problems  How to lose weight safely:  A safe and healthy way to lose weight is to eat fewer calories and get regular exercise  You can lose up about 1 pound a week by decreasing the number of calories you eat by 500 calories each day  Healthy meal plan for weight management:  A healthy meal plan includes a variety of foods, contains fewer calories, and helps you stay healthy  A healthy meal plan includes the following:  · Eat whole-grain foods more often  A healthy meal plan should contain fiber  Fiber is the part of grains, fruits, and vegetables that is not broken down by your body   Whole-grain foods are healthy and provide extra fiber in your diet  Some examples of whole-grain foods are whole-wheat breads and pastas, oatmeal, brown rice, and bulgur  · Eat a variety of vegetables every day  Include dark, leafy greens such as spinach, kale, caity greens, and mustard greens  Eat yellow and orange vegetables such as carrots, sweet potatoes, and winter squash  · Eat a variety of fruits every day  Choose fresh or canned fruit (canned in its own juice or light syrup) instead of juice  Fruit juice has very little or no fiber  · Eat low-fat dairy foods  Drink fat-free (skim) milk or 1% milk  Eat fat-free yogurt and low-fat cottage cheese  Try low-fat cheeses such as mozzarella and other reduced-fat cheeses  · Choose meat and other protein foods that are low in fat  Choose beans or other legumes such as split peas or lentils  Choose fish, skinless poultry (chicken or turkey), or lean cuts of red meat (beef or pork)  Before you cook meat or poultry, cut off any visible fat  · Use less fat and oil  Try baking foods instead of frying them  Add less fat, such as margarine, sour cream, regular salad dressing and mayonnaise to foods  Eat fewer high-fat foods  Some examples of high-fat foods include french fries, doughnuts, ice cream, and cakes  · Eat fewer sweets  Limit foods and drinks that are high in sugar  This includes candy, cookies, regular soda, and sweetened drinks  Exercise:  Exercise at least 30 minutes per day on most days of the week  Some examples of exercise include walking, biking, dancing, and swimming  You can also fit in more physical activity by taking the stairs instead of the elevator or parking farther away from stores  Ask your healthcare provider about the best exercise plan for you  © Copyright [employer] 2018 Information is for End User's use only and may not be sold, redistributed or otherwise used for commercial purposes   All illustrations and images included in CareNotes® are the copyrighted property of [employer]  or [facility] Preventive Visit Patient Instructions  Thank you for completing your Welcome to Medicare Visit or Medicare Annual Wellness Visit today  Your next wellness visit will be due in one year (7/27/2022)  The screening/preventive services that you may require over the next 5-10 years are detailed below  Some tests may not apply to you based off risk factors and/or age  Screening tests ordered at today's visit but not completed yet may show as past due  Also, please note that scanned in results may not display below  Preventive Screenings:  Service Recommendations Previous Testing/Comments   Colorectal Cancer Screening  · Colonoscopy    · Fecal Occult Blood Test (FOBT)/Fecal Immunochemical Test (FIT)  · Fecal DNA/Cologuard Test  · Flexible Sigmoidoscopy Age: 54-65 years old   Colonoscopy: every 10 years (May be performed more frequently if at higher risk)  OR  FOBT/FIT: every 1 year  OR  Cologuard: every 3 years  OR  Sigmoidoscopy: every 5 years  Screening may be recommended earlier than age 48 if at higher risk for colorectal cancer  Also, an individualized decision between you and your healthcare provider will decide whether screening between the ages of 74-80 would be appropriate   Colonoscopy: 12/20/2018  FOBT/FIT: Not on file  Cologuard: Not on file  Sigmoidoscopy: Not on file    Screening Current     Prostate Cancer Screening Individualized decision between patient and health care provider in men between ages of 53-78   Medicare will cover every 12 months beginning on the day after your 50th birthday PSA: 1 0 ng/mL     Screening Current     Hepatitis C Screening Once for adults born between 1945 and 1965  More frequently in patients at high risk for Hepatitis C Hep C Antibody: Not on file        Diabetes Screening 1-2 times per year if you're at risk for diabetes or have pre-diabetes Fasting glucose: 152 mg/dL   A1C: 5 8 %    Screening Not Indicated  History Diabetes   Cholesterol Screening Once every 5 years if you don't have a lipid disorder  May order more often based on risk factors  Lipid panel: 01/20/2021    Screening Current      Other Preventive Screenings Covered by Medicare:  6  Abdominal Aortic Aneurysm (AAA) Screening: covered once if your at risk  You're considered to be at risk if you have a family history of AAA or a male between the age of 73-68 who smoking at least 100 cigarettes in your lifetime  7  Lung Cancer Screening: covers low dose CT scan once per year if you meet all of the following conditions: (1) Age 50-69; (2) No signs or symptoms of lung cancer; (3) Current smoker or have quit smoking within the last 15 years; (4) You have a tobacco smoking history of at least 30 pack years (packs per day x number of years you smoked); (5) You get a written order from a healthcare provider  8  Glaucoma Screening: covered annually if you're considered high risk: (1) You have diabetes OR (2) Family history of glaucoma OR (3)  aged 48 and older OR (3)  American aged 72 and older  5  Osteoporosis Screening: covered every 2 years if you meet one of the following conditions: (1) Have a vertebral abnormality; (2) On glucocorticoid therapy for more than 3 months; (3) Have primary hyperparathyroidism; (4) On osteoporosis medications and need to assess response to drug therapy  10  HIV Screening: covered annually if you're between the age of 12-76  Also covered annually if you are younger than 13 and older than 72 with risk factors for HIV infection  For pregnant patients, it is covered up to 3 times per pregnancy      Immunizations:  Immunization Recommendations   Influenza Vaccine Annual influenza vaccination during flu season is recommended for all persons aged >= 6 months who do not have contraindications   Pneumococcal Vaccine (Prevnar and Pneumovax)  * Prevnar = PCV13  * Pneumovax = PPSV23 Adults 25-60 years old: 1-3 doses may be recommended based on certain risk factors  Adults 72 years old: Prevnar (PCV13) vaccine recommended followed by Pneumovax (PPSV23) vaccine  If already received PPSV23 since turning 65, then PCV13 recommended at least one year after PPSV23 dose  Hepatitis B Vaccine 3 dose series if at intermediate or high risk (ex: diabetes, end stage renal disease, liver disease)   Tetanus (Td) Vaccine - COST NOT COVERED BY MEDICARE PART B Following completion of primary series, a booster dose should be given every 10 years to maintain immunity against tetanus  Td may also be given as tetanus wound prophylaxis  Tdap Vaccine - COST NOT COVERED BY MEDICARE PART B Recommended at least once for all adults  For pregnant patients, recommended with each pregnancy  Shingles Vaccine (Shingrix) - COST NOT COVERED BY MEDICARE PART B  2 shot series recommended in those aged 48 and above     Health Maintenance Due:      Topic Date Due    Hepatitis C Screening  Never done    Colorectal Cancer Screening  12/20/2021     Immunizations Due:      Topic Date Due    Pneumococcal Vaccine: 65+ Years (1 of 2 - PPSV23) Never done    DTaP,Tdap,and Td Vaccines (1 - Tdap) Never done     Advance Directives   What are advance directives? Advance directives are legal documents that state your wishes and plans for medical care  These plans are made ahead of time in case you lose your ability to make decisions for yourself  Advance directives can apply to any medical decision, such as the treatments you want, and if you want to donate organs  What are the types of advance directives? There are many types of advance directives, and each state has rules about how to use them  You may choose a combination of any of the following:  · Living will: This is a written record of the treatment you want  You can also choose which treatments you do not want, which to limit, and which to stop at a certain time  This includes surgery, medicine, IV fluid, and tube feedings  · Durable power of  for healthcare [facility]): This is a written record that states who you want to make healthcare choices for you when you are unable to make them for yourself  This person, called a proxy, is usually a family member or a friend  You may choose more than 1 proxy  · Do not resuscitate (DNR) order:  A DNR order is used in case your heart stops beating or you stop breathing  It is a request not to have certain forms of treatment, such as CPR  A DNR order may be included in other types of advance directives  · Medical directive: This covers the care that you want if you are in a coma, near death, or unable to make decisions for yourself  You can list the treatments you want for each condition  Treatment may include pain medicine, surgery, blood transfusions, dialysis, IV or tube feedings, and a ventilator (breathing machine)  · Values history: This document has questions about your views, beliefs, and how you feel and think about life  This information can help others choose the care that you would choose  Why are advance directives important? An advance directive helps you control your care  Although spoken wishes may be used, it is better to have your wishes written down  Spoken wishes can be misunderstood, or not followed  Treatments may be given even if you do not want them  An advance directive may make it easier for your family to make difficult choices about your care  Cigarette Smoking and Your Health   Risks to your health if you smoke:  Nicotine and other chemicals found in tobacco damage every cell in your body  Even if you are a light smoker, you have an increased risk for cancer, heart disease, and lung disease  If you are pregnant or have diabetes, smoking increases your risk for complications  Benefits to your health if you stop smoking:   · You decrease respiratory symptoms such as coughing, wheezing, and shortness of breath     · You reduce your risk for cancers of the lung, mouth, throat, kidney, bladder, pancreas, stomach, and cervix  If you already have cancer, you increase the benefits of chemotherapy  You also reduce your risk for cancer returning or a second cancer from developing  · You reduce your risk for heart disease, blood clots, heart attack, and stroke  · You reduce your risk for lung infections, and diseases such as pneumonia, asthma, chronic bronchitis, and emphysema  · Your circulation improves  More oxygen can be delivered to your body  If you have diabetes, you lower your risk for complications, such as kidney, artery, and eye diseases  You also lower your risk for nerve damage  Nerve damage can lead to amputations, poor vision, and blindness  · You improve your body's ability to heal and to fight infections  For more information and support to stop smoking:   · [employer]  Phone: 6- 164 - 200-8678  Web Address: [employer]  Weight Management   Why it is important to manage your weight:  Being overweight increases your risk of health conditions such as heart disease, high blood pressure, type 2 diabetes, and certain types of cancer  It can also increase your risk for osteoarthritis, sleep apnea, and other respiratory problems  Aim for a slow, steady weight loss  Even a small amount of weight loss can lower your risk of health problems  How to lose weight safely:  A safe and healthy way to lose weight is to eat fewer calories and get regular exercise  You can lose up about 1 pound a week by decreasing the number of calories you eat by 500 calories each day  Healthy meal plan for weight management:  A healthy meal plan includes a variety of foods, contains fewer calories, and helps you stay healthy  A healthy meal plan includes the following:  · Eat whole-grain foods more often  A healthy meal plan should contain fiber  Fiber is the part of grains, fruits, and vegetables that is not broken down by your body   Whole-grain foods are healthy and provide extra fiber in your diet  Some examples of whole-grain foods are whole-wheat breads and pastas, oatmeal, brown rice, and bulgur  · Eat a variety of vegetables every day  Include dark, leafy greens such as spinach, kale, caity greens, and mustard greens  Eat yellow and orange vegetables such as carrots, sweet potatoes, and winter squash  · Eat a variety of fruits every day  Choose fresh or canned fruit (canned in its own juice or light syrup) instead of juice  Fruit juice has very little or no fiber  · Eat low-fat dairy foods  Drink fat-free (skim) milk or 1% milk  Eat fat-free yogurt and low-fat cottage cheese  Try low-fat cheeses such as mozzarella and other reduced-fat cheeses  · Choose meat and other protein foods that are low in fat  Choose beans or other legumes such as split peas or lentils  Choose fish, skinless poultry (chicken or turkey), or lean cuts of red meat (beef or pork)  Before you cook meat or poultry, cut off any visible fat  · Use less fat and oil  Try baking foods instead of frying them  Add less fat, such as margarine, sour cream, regular salad dressing and mayonnaise to foods  Eat fewer high-fat foods  Some examples of high-fat foods include french fries, doughnuts, ice cream, and cakes  · Eat fewer sweets  Limit foods and drinks that are high in sugar  This includes candy, cookies, regular soda, and sweetened drinks  Exercise:  Exercise at least 30 minutes per day on most days of the week  Some examples of exercise include walking, biking, dancing, and swimming  You can also fit in more physical activity by taking the stairs instead of the elevator or parking farther away from stores  Ask your healthcare provider about the best exercise plan for you  © Copyright [employer] 2018 Information is for End User's use only and may not be sold, redistributed or otherwise used for commercial purposes   All illustrations and images included in CareNotes® are the copyrighted property of [employer]  or [address] [place], [place] [address]

## 2021-07-26 NOTE — ASSESSMENT & PLAN NOTE
Patient still smoking 1/2 pack a day  Counseled patient  He is scheduled for a CT lung cancer screen within next 2-3 weeks    Follow-up 6 months-earlier if worse

## 2021-07-26 NOTE — PROGRESS NOTES
Assessment/Plan:    Type 2 diabetes mellitus without complication, with long-term current use of insulin (HCC)    Lab Results   Component Value Date    HGBA1C 5 8 (H) 05/22/2021   Patient has been well controlled  Weight stable  Continue present treatment  Recheck 6 months    Moderate COPD (chronic obstructive pulmonary disease) (HCC)   Patient still smoking 1/2 pack a day  Counseled patient  He is scheduled for a CT lung cancer screen within next 2-3 weeks  Follow-up 6 months-earlier if worse    Bilateral iliac artery stenosis [facility])   Patient has been stable  No claudication symptoms with exertion  Due for repeat vascular study next year  Urged smoking cessation  Monitor lipids  Recheck 6 months    Hypertension  Well controlled  Cont present treatment  Monitor labs  Recheck 6m         Diagnoses and all orders for this visit:    Medicare annual wellness visit, subsequent    Type 2 diabetes mellitus without complication, with long-term current use of insulin ([facility])    Moderate COPD (chronic obstructive pulmonary disease) ([facility] [address] )    Bilateral iliac artery stenosis (HCC)    Essential hypertension          Subjective:      Patient ID: Chico Duque is a 80 y o  male  f/u multiple med issues and AWV  - pt is feeling well  - pt notes some intermittent hand tremor over the last year  No rigidity  Does not appear to be worsening  Can be intentional or at rest  - pt compliant with diet  Pt has lost 16lb since Feb  Does not have a formal exercise regimen but is active around the house  Denies CP, palpitations, lightheadedness or other CV symptoms with or without exertion  Due for repeat A1C in November  - pt still smoking 1/2ppd  Breathing remains stable - pt with occ productive cough  No worsening wheeze or SOB  - no GI or  complaints  - patient denies any claudication symptoms    He he is down to every other year for his vascular studies-due in 2022  - AWV done        The following portions of the patient's history were reviewed and updated as appropriate:   He  has a past medical history of Colon polyp, COPD (chronic obstructive pulmonary disease) ([place] [address] ), Coronary artery disease, GERD (gastroesophageal reflux disease), Hyperlipidemia, Hypertension, PVD (peripheral vascular disease) ([place] [address] ), and Seasonal allergies  He   Patient Active Problem List    Diagnosis Date Noted    Type 2 diabetes mellitus without complication, with long-term current use of insulin ([place] [address] ) 02/10/2021    Aortic sclerosis 07/13/2020    Skin cancer, basal cell 09/23/2019    Chronic pain of right knee 07/24/2019    Dyslipidemia 06/17/2019    Stenosis of left renal artery (HCC) 06/17/2019    Benign paroxysmal positional vertigo 05/15/2019    Allergic rhinitis 05/15/2019    Moderate COPD (chronic obstructive pulmonary disease) ([place] [address] ) 11/20/2018    Atherosclerosis of native arteries of extremities with intermittent claudication, bilateral legs ([place] [address] ) 05/08/2017    Bilateral iliac artery stenosis (HCC) 05/08/2017    Abdominal aortic stenosis 05/08/2017    Fatigue 01/21/2013    Hypertension 01/21/2013     He  has a past surgical history that includes Colonoscopy (2008); Cardiac surgery (2012); Knee arthroscopy (Bilateral); Shoulder surgery (Right); Tonsillectomy; Appendectomy (2016); and pr colonoscopy flx dx w/collj spec when pfrmd (N/A, 12/20/2018)  He  reports that he has been smoking  He has a 12 50 pack-year smoking history  He has never used smokeless tobacco  He reports that he does not drink alcohol and does not use drugs    Current Outpatient Medications   Medication Sig Dispense Refill    amLODIPine (NORVASC) 2 5 mg tablet TAKE 1 TABLET(2 5 MG) BY MOUTH DAILY 90 tablet 3    aspirin (ECOTRIN LOW STRENGTH) 81 mg EC tablet Take 1 tablet (81 mg total) by mouth daily      atorvastatin (LIPITOR) 40 mg tablet TAKE 1 TABLET(40 MG) BY MOUTH DAILY 90 tablet 5    loratadine (CLARITIN) 10 mg tablet Take 1 tablet (10 mg total) by mouth daily 30 tablet 1    Multiple Vitamins-Minerals (CENTRUM ULTRA MENS) TABS Take by mouth      triamcinolone (KENALOG) 0 1 % cream Apply topically 2 (two) times a day 30 g 0     No current facility-administered medications for this visit  He is allergic to naproxen       Review of Systems   Constitutional: Negative  HENT: Negative  Eyes: Negative  Respiratory: Negative  Cardiovascular: Negative  Gastrointestinal: Negative  Endocrine: Negative  Genitourinary: Negative  Musculoskeletal: Negative  Skin: Negative  Allergic/Immunologic: Negative  Neurological: Negative  Hematological: Negative  Psychiatric/Behavioral: Negative  Objective:      /76   Pulse 80   Temp 97 9 °F (36 6 °C)   Ht 5' 10" (1 778 m)   Wt 82 6 kg (182 lb)   BMI 26 11 kg/m²          Physical Exam  Vitals reviewed  Constitutional:       Appearance: He is well-developed  HENT:      Head: Normocephalic and atraumatic  Right Ear: Tympanic membrane, ear canal and external ear normal       Left Ear: Tympanic membrane, ear canal and external ear normal    Eyes:      General: No scleral icterus  Conjunctiva/sclera: Conjunctivae normal       Pupils: Pupils are equal, round, and reactive to light  Neck:      Thyroid: No thyromegaly  Vascular: No carotid bruit  Cardiovascular:      Rate and Rhythm: Normal rate and regular rhythm  Pulses: Pulses are weak  Dorsalis pedis pulses are 1+ (trace-1+) on the right side and 1+ (trace-1+) on the left side  Heart sounds: Normal heart sounds  No murmur heard  Comments: Poor pedal pulses  Pulmonary:      Effort: Pulmonary effort is normal       Breath sounds: Normal breath sounds  Abdominal:      General: Bowel sounds are normal  There is no distension  Palpations: Abdomen is soft  There is no mass  Tenderness: There is no abdominal tenderness     Musculoskeletal:         General: No swelling, tenderness or deformity  Normal range of motion  Cervical back: Normal range of motion and neck supple  No muscular tenderness  Right lower leg: No edema  Left lower leg: No edema  Feet:      Right foot:      Skin integrity: No ulcer, skin breakdown, erythema, warmth, callus or dry skin  Left foot:      Skin integrity: No ulcer, skin breakdown, erythema, warmth, callus or dry skin  Lymphadenopathy:      Cervical: No cervical adenopathy  Skin:     General: Skin is warm and dry  Capillary Refill: Capillary refill takes less than 2 seconds  Findings: No rash  Neurological:      Mental Status: He is alert and oriented to person, place, and time  Cranial Nerves: No cranial nerve deficit  Sensory: No sensory deficit  Motor: No weakness  Coordination: Coordination normal       Gait: Gait normal       Deep Tendon Reflexes: Reflexes normal       Comments: minicog 5/5     Psychiatric:         Mood and Affect: Mood normal          Behavior: Behavior normal          Thought Content: Thought content normal          Judgment: Judgment normal       Comments: PHQ-9 Depression Screening    PHQ-9:   Frequency of the following problems over the past two weeks:      Little interest or pleasure in doing things: 0 - not at all  Feeling down, depressed, or hopeless: 0 - not at all  PHQ-2 Score: 0              Patient's shoes and socks removed  Right Foot/Ankle   Right Foot Inspection  Skin Exam: skin normal and skin intact no dry skin, no warmth, no callus, no erythema, no maceration, no abnormal color, no pre-ulcer, no ulcer and no callus                          Toe Exam: ROM and strength within normal limits  Sensory   Vibration: intact    Monofilament testing: intact  Vascular  Capillary refills: < 3 seconds  The right DP pulse is 1+ (trace-1+)       Left Foot/Ankle  Left Foot Inspection  Skin Exam: skin normal and skin intactno dry skin, no warmth, no erythema, no maceration, normal color, no pre-ulcer, no ulcer and no callus                         Toe Exam: ROM and strength within normal limits                   Sensory   Vibration: intact    Monofilament: intact  Vascular  Capillary refills: < 3 seconds  The left DP pulse is 1+ (trace-1+)  Assign Risk Category:  No deformity present;  No loss of protective sensation; Weak pulses       Risk: 1

## 2021-07-26 NOTE — PROGRESS NOTES
Assessment and Plan:     Problem List Items Addressed This Visit        Endocrine    Type 2 diabetes mellitus without complication, with long-term current use of insulin ([facility] [address] )       Lab Results   Component Value Date    HGBA1C 5 8 (H) 05/22/2021   Patient has been well controlled  Weight stable  Continue present treatment  Recheck 6 months            Respiratory    Moderate COPD (chronic obstructive pulmonary disease) ([facility] [address] )      Patient still smoking 1/2 pack a day  Counseled patient  He is scheduled for a CT lung cancer screen within next 2-3 weeks  Follow-up 6 months-earlier if worse            Cardiovascular and Mediastinum    Bilateral iliac artery stenosis [facility])      Patient has been stable  No claudication symptoms with exertion  Due for repeat vascular study next year  Urged smoking cessation  Monitor lipids  Recheck 6 months         Hypertension     Well controlled  Cont present treatment  Monitor labs  Recheck 6m             Other Visit Diagnoses     Medicare annual wellness visit, subsequent    -  Primary           Preventive health issues were discussed with patient, and age appropriate screening tests were ordered as noted in patient's After Visit Summary  Personalized health advice and appropriate referrals for health education or preventive services given if needed, as noted in patient's After Visit Summary       History of Present Illness:     Patient presents for Medicare Annual Wellness visit    Patient Care Team:  Monik Holder MD as PCP - Danilo Collins MD Harvest Pique, MD Cleda Leeks, MD (Gastroenterology)  Candi Smart MD as Endoscopist     Problem List:     Patient Active Problem List   Diagnosis    Atherosclerosis of native arteries of extremities with intermittent claudication, bilateral legs ([facility] [address] )    Fatigue    Hypertension    Bilateral iliac artery stenosis ([facility] [address] )    Abdominal aortic stenosis    Moderate COPD (chronic obstructive pulmonary disease) (HCC)    Benign paroxysmal positional vertigo    Allergic rhinitis    Dyslipidemia    Stenosis of left renal artery (HCC)    Chronic pain of right knee    Skin cancer, basal cell    Aortic sclerosis    Type 2 diabetes mellitus without complication, with long-term current use of insulin ([place] [address] )      Past Medical and Surgical History:     Past Medical History:   Diagnosis Date    Colon polyp     [facility]chronic obstructive pulmonary disease) ([facility] )     Coronary artery disease     GERD (gastroesophageal reflux disease)     Hyperlipidemia     Hypertension     PVD (peripheral vascular disease) ([place] [address] )     Seasonal allergies      Past Surgical History:   Procedure Laterality Date    APPENDECTOMY  2016    CARDIAC SURGERY  2012    stents x3    COLONOSCOPY  2008    boonswang    KNEE ARTHROSCOPY Bilateral     post work injury    NV COLONOSCOPY FLX DX W/COLLJ SPEC WHEN PFRMD N/A 12/20/2018    Procedure: COLONOSCOPY;  Surgeon: Moe Bullock MD;  Location: [facility];   Service: Gastroenterology    SHOULDER SURGERY Right     cheese tumor removed    TONSILLECTOMY      age 25      Family History:     Family History   Problem Relation Age of Onset    Heart disease Mother     Cancer Mother         breast    No Known Problems Father       Social History:     Social History     Socioeconomic History    Marital status: /Civil Union     Spouse name: None    Number of children: None    Years of education: None    Highest education level: None   Occupational History    None   Tobacco Use    Smoking status: Current Some Day Smoker     Packs/day: 0 25     Years: 50 00     Pack years: 12 50    Smokeless tobacco: Never Used   Vaping Use    Vaping Use: Never used   Substance and Sexual Activity    Alcohol use: No    Drug use: No    Sexual activity: None   Other Topics Concern    None   Social History Narrative    None     Social Determinants of Health     Financial Resource Strain:     Difficulty of Paying Living Expenses:    Food Insecurity:     Worried About Running Out of Food in the Last Year:     [address] in the Last Year:    Transportation Needs:     Lack of Transportation (Medical):  Lack of Transportation (Non-Medical):    Physical Activity:     Days of Exercise per Week:     Minutes of Exercise per Session:    Stress:     Feeling of Stress :    Social Connections:     Frequency of Communication with Friends and Family:     Frequency of Social Gatherings with Friends and Family:     Attends [employer] Services:     Active Member of Clubs or Organizations:     Attends Club or Organization Meetings:     Marital Status:    Intimate Partner Violence:     Fear of Current or Ex-Partner:     Emotionally Abused:     Physically Abused:     Sexually Abused:       Medications and Allergies:     Current Outpatient Medications   Medication Sig Dispense Refill    amLODIPine (NORVASC) 2 5 mg tablet TAKE 1 TABLET(2 5 MG) BY MOUTH DAILY 90 tablet 3    aspirin (ECOTRIN LOW STRENGTH) 81 mg EC tablet Take 1 tablet (81 mg total) by mouth daily      atorvastatin (LIPITOR) 40 mg tablet TAKE 1 TABLET(40 MG) BY MOUTH DAILY 90 tablet 5    loratadine (CLARITIN) 10 mg tablet Take 1 tablet (10 mg total) by mouth daily 30 tablet 1    Multiple Vitamins-Minerals (CENTRUM ULTRA MENS) TABS Take by mouth      triamcinolone (KENALOG) 0 1 % cream Apply topically 2 (two) times a day 30 g 0     No current facility-administered medications for this visit       Allergies   Allergen Reactions    Naproxen Swelling and Rash      Immunizations:     Immunization History   Administered Date(s) Administered    SARS-CoV-2 / COVID-19 mRNA IM (Pf[employer]) 03/27/2021, 04/17/2021      Health Maintenance:         Topic Date Due    Hepatitis C Screening  Never done    Colorectal Cancer Screening  12/20/2021         Topic Date Due    Pneumococcal Vaccine: 65+ Years (1 of 2 - PPSV23) Never done    DTaP,Tdap,and Td Vaccines (1 - Tdap) Never done      Medicare Health Risk Assessment:     /76   Pulse 80   Temp 97 9 °F (36 6 °C)   Ht 5' 10" (1 778 m)   Wt 82 6 kg (182 lb)   BMI 26 11 kg/m²      Phuc Hollis is here for his Subsequent Wellness visit  Health Risk Assessment:   Patient rates overall health as very good  Patient feels that their physical health rating is same  Patient is very satisfied with their life  Eyesight was rated as same  Hearing was rated as same  Patient feels that their emotional and mental health rating is same  Patients states they are never, rarely angry  Patient states they are never, rarely unusually tired/fatigued  Pain experienced in the last 7 days has been none  Patient states that he has experienced no weight loss or gain in last 6 months  Depression Screening:   PHQ-2 Score: 0      Fall Risk Screening: In the past year, patient has experienced: no history of falling in past year      Home Safety:  Patient does not have trouble with stairs inside or outside of their home  Patient has working smoke alarms and has working carbon monoxide detector  Home safety hazards include: none  Nutrition:   Current diet is Regular and Diabetic  Medications:   Patient is currently taking over-the-counter supplements  OTC medications include: see medication list  Patient is able to manage medications  Activities of Daily Living (ADLs)/Instrumental Activities of Daily Living (IADLs):   Walk and transfer into and out of bed and chair?: Yes  Dress and groom yourself?: Yes    Bathe or shower yourself?: Yes    Feed yourself? Yes  Do your laundry/housekeeping?: Yes  Manage your money, pay your bills and track your expenses?: Yes  Make your own meals?: Yes    Do your own shopping?: Yes    Previous Hospitalizations:   Any hospitalizations or ED visits within the last 12 months?: No      Advance Care Planning:   Living will: No    Durable POA for healthcare:  No Advanced directive: No    Five wishes given: Yes      Cognitive Screening:   Provider or family/friend/caregiver concerned regarding cognition?: No    PREVENTIVE SCREENINGS      Cardiovascular Screening:    General: Screening Current      Diabetes Screening:     General: Screening Not Indicated and History Diabetes      Colorectal Cancer Screening:     General: Screening Current      Prostate Cancer Screening:    General: Screening Current      Osteoporosis Screening:    General: Screening Not Indicated      Abdominal Aortic Aneurysm (AAA) Screening:    Risk factors include: age between 73-67 yo and tobacco use        Lung Cancer Screening:     General: Screening Not Indicated      Hepatitis C Screening:    General: Risks and Benefits Discussed    Hep C Screening Accepted: Yes      Screening, Brief Intervention, and Referral to Treatment (SBIRT)    Screening  Typical number of drinks in a day: 0  Typical number of drinks in a week: 0  Interpretation: Low risk drinking behavior  AUDIT-C Screenin) How often did you have a drink containing alcohol in the past year? never  2) How many drinks did you have on a typical day when you were drinking in the past year? 0  3) How often did you have 6 or more drinks on one occasion in the past year? never    AUDIT-C Score: 0  Interpretation: Score 0-3 (male): Negative screen for alcohol misuse    Single Item Drug Screening:  How often have you used an illegal drug (including marijuana) or a prescription medication for non-medical reasons in the past year? never    Single Item Drug Screen Score: 0  Interpretation: Negative screen for possible drug use disorder    Other Counseling Topics:   Calcium and vitamin D intake and regular weightbearing exercise         Barry Arevalo MD

## 2021-07-26 NOTE — ASSESSMENT & PLAN NOTE
Patient has been stable  No claudication symptoms with exertion  Due for repeat vascular study next year  Urged smoking cessation  Monitor lipids    Recheck 6 months

## 2021-07-26 NOTE — ASSESSMENT & PLAN NOTE
Lab Results   Component Value Date    HGBA1C 5 8 (H) 05/22/2021   Patient has been well controlled  Weight stable  Continue present treatment    Recheck 6 months

## 2021-07-29 ENCOUNTER — HOSPITAL ENCOUNTER (OUTPATIENT)
Dept: CT IMAGING | Facility: HOSPITAL | Age: 71
Discharge: HOME | End: 2021-07-29
Payer: MEDICARE

## 2021-07-29 DIAGNOSIS — Z12.2 ENCOUNTER FOR SCREENING FOR LUNG CANCER: ICD-10-CM

## 2021-07-29 PROCEDURE — 71271 CT THORAX LUNG CANCER SCR C-: CPT

## 2021-09-01 DIAGNOSIS — I10 ESSENTIAL HYPERTENSION: ICD-10-CM

## 2021-09-02 RX ORDER — AMLODIPINE BESYLATE 2.5 MG/1
2.5 TABLET ORAL DAILY
Qty: 90 TABLET | Refills: 3 | Status: SHIPPED | OUTPATIENT
Start: 2021-09-02

## 2021-11-27 ENCOUNTER — IMMUNIZATIONS (OUTPATIENT)
Dept: FAMILY MEDICINE CLINIC | Facility: HOSPITAL | Age: 71
End: 2021-11-27

## 2021-11-27 DIAGNOSIS — Z23 ENCOUNTER FOR IMMUNIZATION: Primary | ICD-10-CM

## 2021-11-27 PROCEDURE — 91300 COVID-19 PFIZER VACC 0.3 ML: CPT

## 2021-11-27 PROCEDURE — 0001A COVID-19 PFIZER VACC 0.3 ML: CPT

## 2021-12-31 ENCOUNTER — APPOINTMENT (EMERGENCY)
Dept: RADIOLOGY | Facility: HOSPITAL | Age: 71
End: 2021-12-31
Payer: MEDICARE

## 2021-12-31 ENCOUNTER — HOSPITAL ENCOUNTER (EMERGENCY)
Facility: HOSPITAL | Age: 71
Discharge: HOME | End: 2021-12-31
Attending: EMERGENCY MEDICINE | Admitting: EMERGENCY MEDICINE
Payer: MEDICARE

## 2021-12-31 VITALS
TEMPERATURE: 98.4 F | OXYGEN SATURATION: 96 % | SYSTOLIC BLOOD PRESSURE: 171 MMHG | RESPIRATION RATE: 18 BRPM | HEART RATE: 86 BPM | DIASTOLIC BLOOD PRESSURE: 98 MMHG

## 2021-12-31 DIAGNOSIS — S61.011A LACERATION OF RIGHT THUMB WITHOUT FOREIGN BODY WITHOUT DAMAGE TO NAIL, INITIAL ENCOUNTER: Primary | ICD-10-CM

## 2021-12-31 DIAGNOSIS — I70.212 ATHEROSCLER OF NATIVE ARTERY OF LEFT LEG WITH INTERMIT CLAUDICATION (HCC): ICD-10-CM

## 2021-12-31 PROCEDURE — 90471 IMMUNIZATION ADMIN: CPT

## 2021-12-31 PROCEDURE — 99283 EMERGENCY DEPT VISIT LOW MDM: CPT

## 2021-12-31 PROCEDURE — 99282 EMERGENCY DEPT VISIT SF MDM: CPT | Performed by: PHYSICIAN ASSISTANT

## 2021-12-31 PROCEDURE — 73140 X-RAY EXAM OF FINGER(S): CPT

## 2021-12-31 PROCEDURE — 90715 TDAP VACCINE 7 YRS/> IM: CPT | Performed by: PHYSICIAN ASSISTANT

## 2021-12-31 PROCEDURE — G0168 WOUND CLOSURE BY ADHESIVE: HCPCS | Performed by: EMERGENCY MEDICINE

## 2021-12-31 RX ORDER — LIDOCAINE HYDROCHLORIDE 10 MG/ML
5 INJECTION, SOLUTION EPIDURAL; INFILTRATION; INTRACAUDAL; PERINEURAL ONCE
Status: DISCONTINUED | OUTPATIENT
Start: 2021-12-31 | End: 2021-12-31

## 2021-12-31 RX ADMIN — TETANUS TOXOID, REDUCED DIPHTHERIA TOXOID AND ACELLULAR PERTUSSIS VACCINE, ADSORBED 0.5 ML: 5; 2.5; 8; 8; 2.5 SUSPENSION INTRAMUSCULAR at 16:19

## 2021-12-31 NOTE — ED PROVIDER NOTES
History  Chief Complaint   Patient presents with    Thumb Laceration     Lacerated right thumb while using tablesaw around 1400  +ASA     Collin Taveras  is a 80 y o  male who presents to the ED with complaints of laceration to the right thumb which was sustained approximately 1 5 hours ago while using a power saw  Patient states he was able to control bleeding with pressure  Patient does take daily aspirin  Unsure of last TDaP  History provided by:  Patient  Laceration  Location:  Finger  Finger laceration location:  R thumb  Length:  5 cm  Depth: Through dermis  Time since incident:  2 hours  Tetanus status:  Unknown  Associated symptoms: no fever, no focal weakness, no numbness, no rash, no redness, no swelling and no streaking        Prior to Admission Medications   Prescriptions Last Dose Informant Patient Reported? Taking?    Multiple Vitamins-Minerals (CENTRUM ULTRA MENS) TABS  Self Yes No   Sig: Take by mouth   amLODIPine (NORVASC) 2 5 mg tablet   No No   Sig: Take 1 tablet (2 5 mg total) by mouth daily   aspirin (ECOTRIN LOW STRENGTH) 81 mg EC tablet  Self No No   Sig: Take 1 tablet (81 mg total) by mouth daily   atorvastatin (LIPITOR) 40 mg tablet  Self No No   Sig: TAKE 1 TABLET(40 MG) BY MOUTH DAILY   loratadine (CLARITIN) 10 mg tablet  Self No No   Sig: Take 1 tablet (10 mg total) by mouth daily   triamcinolone (KENALOG) 0 1 % cream  Self No No   Sig: Apply topically 2 (two) times a day      Facility-Administered Medications: None       Past Medical History:   Diagnosis Date    Colon polyp     COPD (chronic obstructive pulmonary disease) (HCC)     Coronary artery disease     GERD (gastroesophageal reflux disease)     Hyperlipidemia     Hypertension     PVD [facility]peripheral vascular disease) ([facility] [address] )     Seasonal allergies        Past Surgical History:   Procedure Laterality Date    APPENDECTOMY  2016    CARDIAC SURGERY  2012    stents x3    [facility]    KNEE ARTHROSCOPY Bilateral     post work injury    RI COLONOSCOPY FLX DX W/COLLJ Raquelfaustotaiwo 1978 PFRMD N/A 12/20/2018    Procedure: COLONOSCOPY;  Surgeon: Dirk Chandler MD;  Location: [facility]; Service: Gastroenterology    SHOULDER SURGERY Right     cheese tumor removed    TONSILLECTOMY      age 25       Family History   Problem Relation Age of Onset    Heart disease Mother     Cancer Mother         breast    No Known Problems Father      I have reviewed and agree with the history as documented  E-Cigarette/Vaping    E-Cigarette Use Never User      E-Cigarette/Vaping Substances     Social History     Tobacco Use    Smoking status: Current Some Day Smoker     Packs/day: 0 25     Years: 50 00     Pack years: 12 50    Smokeless tobacco: Never Used   Vaping Use    Vaping Use: Never used   Substance Use Topics    Alcohol use: No    Drug use: No       Review of Systems   Constitutional: Negative for appetite change, chills, fever and unexpected weight change  HENT: Negative for congestion, drooling, ear pain, rhinorrhea, sore throat, trouble swallowing and voice change  Eyes: Negative for pain, discharge, redness and visual disturbance  Respiratory: Negative for cough, shortness of breath, wheezing and stridor  Cardiovascular: Negative for chest pain, palpitations and leg swelling  Gastrointestinal: Negative for abdominal pain, blood in stool, constipation, diarrhea, nausea and vomiting  Genitourinary: Negative for dysuria, flank pain, frequency, hematuria and urgency  Musculoskeletal: Negative for gait problem, joint swelling, neck pain and neck stiffness  Skin: Positive for wound  Negative for color change and rash  Neurological: Negative for dizziness, focal weakness, seizures, light-headedness and headaches  Physical Exam  Physical Exam  Vitals and nursing note reviewed  Constitutional:       Appearance: He is well-developed  HENT:      Head: Normocephalic and atraumatic  Nose: Nose normal    Eyes:      Conjunctiva/sclera: Conjunctivae normal       Pupils: Pupils are equal, round, and reactive to light  Cardiovascular:      Rate and Rhythm: Normal rate and regular rhythm  Pulmonary:      Effort: Pulmonary effort is normal       Breath sounds: Normal breath sounds  Musculoskeletal:         General: Normal range of motion  Cervical back: Normal range of motion and neck supple  Comments: 5 cm laceration to the palmar aspect of the right thumb with overlying skin avulsion, bleeding controlled, no FB visualized  NVI  Skin:     General: Skin is warm and dry  Capillary Refill: Capillary refill takes less than 2 seconds  Neurological:      Mental Status: He is alert and oriented to person, place, and time  Vital Signs  ED Triage Vitals [12/31/21 1526]   Temperature Pulse Respirations Blood Pressure SpO2   98 4 °F (36 9 °C) 86 18 (!) 171/98 96 %      Temp Source Heart Rate Source Patient Position - Orthostatic VS BP Location FiO2 (%)   Oral -- Sitting Left arm --      Pain Score       --           Vitals:    12/31/21 1526   BP: (!) 171/98   Pulse: 86   Patient Position - Orthostatic VS: Sitting         Visual Acuity      ED Medications  Medications   tetanus-diphtheria-acellular pertussis (BOOSTRIX) IM injection 0 5 mL (has no administration in time range)       Diagnostic Studies  Results Reviewed     None                 XR thumb 2 views RIGHT   Final Result by Renu Hollins MD (12/31 1537)      No acute osseous abnormality  Degenerative changes as described  Workstation performed: [id number]                    Procedures  Procedures         ED Course  ED Course as of 12/31/21 1606   Fri Dec 31, 2021   1527 Dressing applied - will plan x-ray, wound re-evaluation and wound closure                                                MDM    Disposition  Final diagnoses:   None     ED Disposition     None      Follow-up Information    None Patient's Medications   Discharge Prescriptions    No medications on file       No discharge procedures on file      PDMP Review     None          ED Provider  Electronically Signed by           Nguyễn Esquivel PA-C  12/31/21 8203

## 2021-12-31 NOTE — ED PROVIDER NOTES
History  Chief Complaint   Patient presents with    Thumb Laceration     Lacerated right thumb while using tablesaw around 1400  +ASA     Patient presents after sustaining a right thumb laceration while he was using a power saw at home approx 1 5 hours ago  He has retained full ROM of the thumb  He denies any excessive blood loss and was able to control bleeding before presenting to the ED  The laceration was approx 4 cm in length across the pad of his thumb  No foreign bodies were observed within the wound bed  A flap of superficial skin down to the subcutaneous tissue was avulsed from the thumb with exposed subcutaneous tissue on the proximal portion of the laceration  He is not sure when he last had a tetanus shot  Prior to Admission Medications   Prescriptions Last Dose Informant Patient Reported? Taking?    Multiple Vitamins-Minerals (CENTRUM ULTRA MENS) TABS  Self Yes No   Sig: Take by mouth   amLODIPine (NORVASC) 2 5 mg tablet   No No   Sig: Take 1 tablet (2 5 mg total) by mouth daily   aspirin (ECOTRIN LOW STRENGTH) 81 mg EC tablet  Self No No   Sig: Take 1 tablet (81 mg total) by mouth daily   atorvastatin (LIPITOR) 40 mg tablet  Self No No   Sig: TAKE 1 TABLET(40 MG) BY MOUTH DAILY   loratadine (CLARITIN) 10 mg tablet  Self No No   Sig: Take 1 tablet (10 mg total) by mouth daily   triamcinolone (KENALOG) 0 1 % cream  Self No No   Sig: Apply topically 2 (two) times a day      Facility-Administered Medications: None       Past Medical History:   Diagnosis Date    Colon polyp     COPD (chronic obstructive pulmonary disease) (HCC)     Coronary artery disease     GERD (gastroesophageal reflux disease)     Hyperlipidemia     Hypertension     PVD (peripheral vascular disease) ([address] )     Seasonal allergies        Past Surgical History:   Procedure Laterality Date    APPENDECTOMY  2016    CARDIAC SURGERY  2012    stents x3    COLONOSCOPY  2008    boonswa    KNEE ARTHROSCOPY Bilateral     post work injury    [place] COLONOSCOPY FLX DX W/COLLJ Raquelfaustotaiwo 1978 PFRMD N/A 12/20/2018    Procedure: COLONOSCOPY;  Surgeon: Rand Prado MD;  Location: [facility]; Service: Gastroenterology    SHOULDER SURGERY Right     cheese tumor removed    TONSILLECTOMY      age 25       Family History   Problem Relation Age of Onset    Heart disease Mother     Cancer Mother         breast    No Known Problems Father      I have reviewed and agree with the history as documented  E-Cigarette/Vaping    E-Cigarette Use Never User      E-Cigarette/Vaping Substances     Social History     Tobacco Use    Smoking status: Current Some Day Smoker     Packs/day: 0 25     Years: 50 00     Pack years: 12 50    Smokeless tobacco: Never Used   Vaping Use    Vaping Use: Never used   Substance Use Topics    Alcohol use: No    Drug use: No        Review of Systems   Skin: Positive for wound (4 cm right thumb laceration over the pad of the thumb)  Negative for pallor  Neurological: Negative for weakness and numbness  Hematological: Does not bruise/bleed easily  All other systems reviewed and are negative  Physical Exam  ED Triage Vitals [12/31/21 1526]   Temperature Pulse Respirations Blood Pressure SpO2   98 4 °F (36 9 °C) 86 18 (!) 171/98 96 %      Temp Source Heart Rate Source Patient Position - Orthostatic VS BP Location FiO2 (%)   Oral -- Sitting Left arm --      Pain Score       --             Orthostatic Vital Signs  Vitals:    12/31/21 1526   BP: (!) 171/98   Pulse: 86   Patient Position - Orthostatic VS: Sitting       Physical Exam  Constitutional:       General: He is not in acute distress  Appearance: Normal appearance  He is not ill-appearing  HENT:      Head: Normocephalic  Nose: Nose normal       Mouth/Throat:      Mouth: Mucous membranes are moist       Pharynx: Oropharynx is clear  Eyes:      Extraocular Movements: Extraocular movements intact        Conjunctiva/sclera: Conjunctivae normal  Cardiovascular:      Rate and Rhythm: Normal rate and regular rhythm  Pulses: Normal pulses  Heart sounds: Normal heart sounds  Pulmonary:      Effort: Pulmonary effort is normal       Breath sounds: Normal breath sounds  Abdominal:      General: Abdomen is flat  Palpations: Abdomen is soft  Musculoskeletal:         General: Normal range of motion  Skin:     Capillary Refill: Capillary refill takes less than 2 seconds  Findings: Lesion present  Bruisincm R thumb laceration over the dorsal side  Neurological:      General: No focal deficit present  Mental Status: He is alert and oriented to person, place, and time  Psychiatric:         Mood and Affect: Mood normal          Behavior: Behavior normal          Thought Content: Thought content normal          Judgment: Judgment normal          ED Medications  Medications   tetanus-diphtheria-acellular pertussis (BOOSTRIX) IM injection 0 5 mL (0 5 mL Intramuscular Given 21 1619)       Diagnostic Studies  Results Reviewed     None                 XR thumb 2 views RIGHT   Final Result by Luias Flores MD ( 153)      No acute osseous abnormality  Degenerative changes as described  Workstation performed: [id number]               Procedures  Laceration repair    Date/Time: 2021 5:00 PM  Performed by: Shani Tyler DO  Authorized by: Shani Tyler DO   Consent: Verbal consent obtained  Written consent not obtained    Consent given by: patient  Patient understanding: patient states understanding of the procedure being performed  Patient consent: the patient's understanding of the procedure matches consent given  Body area: upper extremity  Location details: right thumb  Laceration length: 4 cm  Foreign bodies: no foreign bodies  Tendon involvement: none  Nerve involvement: none  Vascular damage: yes    Sedation:  Patient sedated: no        Procedure Details:  Irrigation solution: tap water  Irrigation method: tap  Amount of cleaning: standard  Debridement: none  Skin closure: glue  Dressing: gauze roll and 4x4 sterile gauze  Comments: Wound was approximated with dermabond skin adhesive and overlayed with tegaderm strips  The thumb was then wrapped in non adhesive gauze  ED Course                                       MDM  Number of Diagnoses or Management Options  Laceration of right thumb without foreign body without damage to nail, initial encounter: minor     Amount and/or Complexity of Data Reviewed  Tests in the radiology section of CPT®: ordered and reviewed    Risk of Complications, Morbidity, and/or Mortality  Presenting problems: minimal  Diagnostic procedures: minimal  Management options: minimal    Patient Progress  Patient progress: improved      Disposition  Final diagnoses:   Laceration of right thumb without foreign body without damage to nail, initial encounter     Time reflects when diagnosis was documented in both MDM as applicable and the Disposition within this note     Time User Action Codes Description Comment    12/31/2021  5:02  [facility], [address] [place] [address] Laceration of right thumb without foreign body without damage to nail, initial encounter       ED Disposition     ED Disposition Condition Date/Time Comment    Discharge Stable Fri Dec 31, 2021  5:02 PM Charmayne Siva  discharge to home/self care  Follow-up Information     Follow up With Specialties Details Why Eloisa Bruno MD Family Medicine Call  As needed [address] [address]  [phone]            Patient's Medications   Discharge Prescriptions    No medications on file     No discharge procedures on file  PDMP Review     None           ED Provider  Attending physically available and evaluated Charmayne Siva I managed the patient along with the ED Attending      Electronically Signed by         Lilly Quinones Joaquin Vasquez,   12/31/21 173

## 2021-12-31 NOTE — ED ATTENDING ATTESTATION
12/31/2021  JOSE ELIAS [address] [facility], saw and evaluated the patient  I have discussed the patient with the resident/non-physician practitioner and agree with the resident's/non-physician practitioner's findings, Plan of Care, and MDM as documented in the resident's/non-physician practitioner's note, except where noted  All available labs and Radiology studies were reviewed  I was present for key portions of any procedure(s) performed by the resident/non-physician practitioner and I was immediately available to provide assistance  At this point I agree with the current assessment done in the Emergency Department  I have conducted an independent evaluation of this patient a history and physical is as follows:        52-year-old male presents with table saw injury  , patient was cutting a piece alignment araseli, slipped, cut his thumb , right palmar aspect  , over the distal phalanx  , no nail involvement  , patient has a flap of epidermis, no distal numbness or weakness  , no other areas of injury  , moderate amount of bleeding at the scene  Not currently bleeding now  This occurred about an hour prior to arrival       Review of Systems   Constitutional: Negative for fever  Respiratory: Negative for chest tightness and shortness of breath  Cardiovascular: Negative for chest pain  Skin: Negative for rash  Neurological: Negative for dizziness, light-headedness and headaches  Physical Exam  Vitals reviewed  Constitutional:       Appearance: He is well-developed  HENT:      Head: Atraumatic  Eyes:      General: No scleral icterus  Right eye: No discharge  Left eye: No discharge  Conjunctiva/sclera: Conjunctivae normal    Neck:      Trachea: No tracheal deviation  Pulmonary:      Effort: Pulmonary effort is normal  No respiratory distress  Breath sounds: No stridor  Musculoskeletal:         General: No deformity  Cervical back: Neck supple     Skin:     General: Skin is warm and dry  Coloration: Skin is not pale  Findings: No erythema or rash  Comments: 4 cm laceration to right thumb, palmar aspect overlying distal phalanx   Neurological:      Mental Status: He is alert  Motor: No abnormal muscle tone  Coordination: Coordination normal               ED Course         Critical Care Time  Laceration repair    Date/Time: 12/31/2021 4:59 PM  Performed by: Mina Palumbo DO  Authorized by: Mina Palumbo DO   Consent: Verbal consent obtained  Risks and benefits: risks, benefits and alternatives were discussed  Body area: upper extremity  Location details: right thumb  Laceration length: 4 cm  Tendon involvement: none  Nerve involvement: none  Vascular damage: no    Sedation:  Patient sedated: no        Procedure Details:  Irrigation solution: tap water  Skin closure: glue  Approximation: close  Dressing: 4x4 sterile gauze (steri strip)          MDM  Number of Diagnoses or Management Options  Laceration of right thumb without foreign body without damage to nail, initial encounter: new, needed workup     Amount and/or Complexity of Data Reviewed  Tests in the radiology section of CPT®: ordered and reviewed  Independent visualization of images, tracings, or specimens: yes            Time reflects when diagnosis was documented in both MDM as applicable and the Disposition within this note     Time User Action Codes Description Comment    12/31/2021  5:02  [facility], [address] Laceration of right thumb without foreign body without damage to nail, initial encounter       ED Disposition     ED Disposition Condition Date/Time Comment    Discharge Stable Fri Dec 31, 2021  5:02 PM Jenna Fox  discharge to home/self care  Follow-up Information     Follow up With Specialties Details Why Nevaeh Christina MD Family Medicine Call  As needed [address]    [facility] [phone] ,[address]

## 2022-01-03 RX ORDER — ATORVASTATIN CALCIUM 40 MG/1
TABLET, FILM COATED ORAL
Qty: 90 TABLET | Refills: 3 | Status: SHIPPED | OUTPATIENT
Start: 2022-01-03

## 2022-01-31 ENCOUNTER — OFFICE VISIT (OUTPATIENT)
Dept: FAMILY MEDICINE CLINIC | Facility: CLINIC | Age: 72
End: 2022-01-31
Payer: MEDICARE

## 2022-01-31 VITALS
OXYGEN SATURATION: 99 % | HEART RATE: 83 BPM | BODY MASS INDEX: 26.63 KG/M2 | TEMPERATURE: 98.6 F | WEIGHT: 186 LBS | HEIGHT: 70 IN | DIASTOLIC BLOOD PRESSURE: 78 MMHG | SYSTOLIC BLOOD PRESSURE: 136 MMHG

## 2022-01-31 DIAGNOSIS — Z79.4 TYPE 2 DIABETES MELLITUS WITHOUT COMPLICATION, WITH LONG-TERM CURRENT USE OF INSULIN (HCC): Primary | ICD-10-CM

## 2022-01-31 DIAGNOSIS — Z12.5 SCREENING FOR MALIGNANT NEOPLASM OF PROSTATE: ICD-10-CM

## 2022-01-31 DIAGNOSIS — E11.9 TYPE 2 DIABETES MELLITUS WITHOUT COMPLICATION, WITH LONG-TERM CURRENT USE OF INSULIN (HCC): Primary | ICD-10-CM

## 2022-01-31 DIAGNOSIS — I77.1 BILATERAL ILIAC ARTERY STENOSIS (HCC): ICD-10-CM

## 2022-01-31 DIAGNOSIS — J44.9 MODERATE COPD (CHRONIC OBSTRUCTIVE PULMONARY DISEASE) (HCC): ICD-10-CM

## 2022-01-31 DIAGNOSIS — I70.213 ATHEROSCLEROSIS OF NATIVE ARTERIES OF EXTREMITIES WITH INTERMITTENT CLAUDICATION, BILATERAL LEGS (HCC): ICD-10-CM

## 2022-01-31 DIAGNOSIS — I10 PRIMARY HYPERTENSION: ICD-10-CM

## 2022-01-31 PROBLEM — R94.2 PULMONARY FUNCTION STUDIES ABNORMAL: Status: ACTIVE | Noted: 2022-01-31

## 2022-01-31 PROBLEM — R91.8 LUNG FIELD ABNORMAL: Status: ACTIVE | Noted: 2022-01-31

## 2022-01-31 PROBLEM — F17.200 TOBACCO USE DISORDER: Status: ACTIVE | Noted: 2022-01-31

## 2022-01-31 PROCEDURE — 99214 OFFICE O/P EST MOD 30 MIN: CPT | Performed by: FAMILY MEDICINE

## 2022-01-31 RX ORDER — METAPROTERENOL SULFATE 10 MG
TABLET ORAL EVERY 12 HOURS
COMMUNITY

## 2022-01-31 NOTE — ASSESSMENT & PLAN NOTE
Pt still smoking  He understands that he is at risk for worsening PAD if he does not stop  Continue to monitor diet  Encouraged ambulation  F/u with vasc surgery   Recheck 6m

## 2022-01-31 NOTE — PROGRESS NOTES
Assessment/Plan:    Type 2 diabetes mellitus without complication, with long-term current use of insulin (HCC)    Lab Results   Component Value Date    HGBA1C 5 8 (H) 05/22/2021   Pt due for repeat labs  Continue to monitor diet  Encouraged weight loss  Recheck 6m    Moderate COPD (chronic obstructive pulmonary disease) (HCC)  Breathing stable  Still smoking - counseled  Continue to monitor  Recheck 6m    Atherosclerosis of native arteries of extremities with intermittent claudication, bilateral legs (HCC)  Pt still smoking  He understands that he is at risk for worsening PAD if he does not stop  Continue to monitor diet  Encouraged ambulation  F/u with vasc surgery  Recheck 6m    Bilateral iliac artery stenosis (HCC)  Pt still smoking  He understands that he is at risk for worsening PAD if he does not stop  Continue to monitor diet  Encouraged ambulation  F/u with vasc surgery  Recheck 6m    Hypertension  Well controlled  Cont present treatment  Monitor labs  Recheck 6m         Diagnoses and all orders for this visit:    Type 2 diabetes mellitus without complication, with long-term current use of insulin (HCC)  -     Hemoglobin A1C; Future  -     Comprehensive metabolic panel; Future  -     Microalbumin / creatinine urine ratio; Future    Moderate COPD (chronic obstructive pulmonary disease) (HCC)    Atherosclerosis of native arteries of extremities with intermittent claudication, bilateral legs (HCC)    Primary hypertension    Bilateral iliac artery stenosis (HCC)    Screening for malignant neoplasm of prostate  -     PSA, Total Screen; Future    Other orders  -     Omega-3 Fatty Acids (Omega-3 Fish Oil) 500 MG CAPS; Every 12 hours          Subjective:      Patient ID: Luisa Salinas  is a 80 y o  male  f/u multiple med issues  - pt is feeling well  - still smoking  abd and legs feel good  No claudication  Presently seeing vasc surgery every 2 years  - pt continues to work on diet   Overdue for labs and ophth eval    - pt denies CP, palpitations, lightheadedness or other CV symptoms with or without exertion  - pt states that his breathing remains stable despite his smoking  No worsening wheeze or SOB  - no GI or  complaints        The following portions of the patient's history were reviewed and updated as appropriate:   He  has a past medical history of Colon polyp, COPD (chronic obstructive pulmonary disease) ([facility] ), Coronary artery disease, GERD (gastroesophageal reflux disease), Hyperlipidemia, Hypertension, PVD (peripheral vascular disease) ([facility] ), and Seasonal allergies  He   Patient Active Problem List    Diagnosis Date Noted    Pulmonary function studies abnormal 01/31/2022    Tobacco use disorder 01/31/2022    Lung field abnormal 01/31/2022    Type 2 diabetes mellitus without complication, with long-term current use of insulin ([place] [address] ) 02/10/2021    Aortic sclerosis 07/13/2020    Skin cancer, basal cell 09/23/2019    Chronic pain of right knee 07/24/2019    Dyslipidemia 06/17/2019    Stenosis of left renal artery (HCC) 06/17/2019    Benign paroxysmal positional vertigo 05/15/2019    Allergic rhinitis 05/15/2019    Moderate COPD (chronic obstructive pulmonary disease) ([place] [address] ) 11/20/2018    Atherosclerosis of native arteries of extremities with intermittent claudication, bilateral legs ([place] [address] ) 05/08/2017    Bilateral iliac artery stenosis (HCC) 05/08/2017    Abdominal aortic stenosis 05/08/2017    Fatigue 01/21/2013    Hypertension 01/21/2013     He  has a past surgical history that includes Colonoscopy (2008); Cardiac surgery (2012); Knee arthroscopy (Bilateral); Shoulder surgery (Right); Tonsillectomy; Appendectomy (2016); and pr colonoscopy flx dx w/collj spec when pfrmd (N/A, 12/20/2018)  He  reports that he has been smoking  He has a 12 50 pack-year smoking history  He has never used smokeless tobacco  He reports that he does not drink alcohol and does not use drugs    Current Outpatient Medications   Medication Sig Dispense Refill    amLODIPine (NORVASC) 2 5 mg tablet Take 1 tablet (2 5 mg total) by mouth daily 90 tablet 3    aspirin (ECOTRIN LOW STRENGTH) 81 mg EC tablet Take 1 tablet (81 mg total) by mouth daily      atorvastatin (LIPITOR) 40 mg tablet TAKE ONE TABLET BY MOUTH EVERY DAY 90 tablet 3    loratadine (CLARITIN) 10 mg tablet Take 1 tablet (10 mg total) by mouth daily 30 tablet 1    Multiple Vitamins-Minerals (CENTRUM ULTRA MENS) TABS Take by mouth      triamcinolone (KENALOG) 0 1 % cream Apply topically 2 (two) times a day 30 g 0    Omega-3 Fatty Acids (Omega-3 Fish Oil) 500 MG CAPS Every 12 hours       No current facility-administered medications for this visit  He is allergic to naproxen       Review of Systems   Constitutional: Negative  HENT: Negative  Eyes: Negative  Respiratory: Negative  Cardiovascular: Negative  Gastrointestinal: Negative  Endocrine: Negative  Genitourinary: Negative  Musculoskeletal: Negative  Skin: Negative  Allergic/Immunologic: Negative  Neurological: Negative  Hematological: Negative  Psychiatric/Behavioral: Negative  Objective:      /78 (BP Location: Left arm, Patient Position: Sitting)   Pulse 83   Temp 98 6 °F (37 °C)   Ht 5' 10" (1 778 m)   Wt 84 4 kg (186 lb)   SpO2 99%   BMI 26 69 kg/m²          Physical Exam  Vitals reviewed  Constitutional:       Appearance: He is well-developed  HENT:      Head: Normocephalic and atraumatic  Right Ear: Tympanic membrane, ear canal and external ear normal       Left Ear: Tympanic membrane, ear canal and external ear normal    Eyes:      General: No scleral icterus  Extraocular Movements: Extraocular movements intact  Conjunctiva/sclera: Conjunctivae normal       Pupils: Pupils are equal, round, and reactive to light  Neck:      Thyroid: No thyromegaly  Vascular: No carotid bruit     Cardiovascular:      Rate and Rhythm: Normal rate and regular rhythm  Pulses:           Dorsalis pedis pulses are 1+ on the right side and 1+ on the left side  Heart sounds: Normal heart sounds  No murmur heard  Comments: Poor pedal pulses  Pulmonary:      Effort: Pulmonary effort is normal       Breath sounds: Normal breath sounds  Abdominal:      General: Bowel sounds are normal  There is no distension  Palpations: Abdomen is soft  There is no mass  Tenderness: There is no abdominal tenderness  Musculoskeletal:         General: No swelling or tenderness  Normal range of motion  Cervical back: Normal range of motion and neck supple  No muscular tenderness  Right lower leg: No edema  Left lower leg: No edema  Feet:      Right foot:      Skin integrity: No ulcer, skin breakdown, erythema, warmth, callus or dry skin  Left foot:      Skin integrity: No ulcer, skin breakdown, erythema, warmth, callus or dry skin  Lymphadenopathy:      Cervical: No cervical adenopathy  Skin:     General: Skin is warm and dry  Capillary Refill: Capillary refill takes less than 2 seconds  Findings: No rash  Neurological:      Mental Status: He is alert and oriented to person, place, and time  Cranial Nerves: No cranial nerve deficit  Sensory: No sensory deficit  Motor: No weakness        Gait: Gait normal    Psychiatric:         Mood and Affect: Mood normal

## 2022-01-31 NOTE — ASSESSMENT & PLAN NOTE
Lab Results   Component Value Date    HGBA1C 5 8 (H) 05/22/2021   Pt due for repeat labs  Continue to monitor diet  Encouraged weight loss   Recheck 6m

## 2022-01-31 NOTE — PATIENT INSTRUCTIONS
10% - bad control"> 10% - bad control,Hemoglobin A1c (HbA1c) greater than 10% indicating poor diabetic control,Haemoglobin A1c greater than 10% indicating poor diabetic control">   Diabetes Mellitus Type 2 in Adults, Ambulatory Care   GENERAL INFORMATION:   Diabetes mellitus type 2  is a disease that affects how your body uses glucose (sugar)  Insulin helps move sugar out of the blood so it can be used for energy  Normally, when the blood sugar level increases, the pancreas makes more insulin  Type 2 diabetes develops because either the body cannot make enough insulin, or it cannot use the insulin correctly  After many years, your pancreas may stop making insulin  Common symptoms include the following:   · More hunger or thirst than usual     · Frequent urination     · Weight loss without trying     · Blurred vision  Seek immediate care for the following symptoms:   · Severe abdominal pain, or pain that spreads to your back  You may also be vomiting  · Trouble staying awake or focusing    · Shaking or sweating    · Blurred or double vision    · Breath has a fruity, sweet smell    · Breathing is deep and labored, or rapid and shallow    · Heartbeat is fast and weak  Treatment for diabetes mellitus type 2  includes keeping your blood sugar at a normal level  You must eat the right foods, and exercise regularly  You may also need medicine if you cannot control your blood sugar level with nutrition and exercise  Manage diabetes mellitus type 2:   · Check your blood sugar level  You will be taught how to check a small drop of blood in a glucose monitor  Ask your healthcare provider when and how often to check during the day  Ask your healthcare provider what your blood sugar levels should be when you check them  · Keep track of carbohydrates (sugar and starchy foods)  Your blood sugar level can get too high if you eat too many carbohydrates   Your dietitian will help you plan meals and snacks that have the right amount of carbohydrates  · Eat low-fat foods  Some examples are skinless chicken and low-fat milk  · Eat less sodium (salt)  Some examples of high-sodium foods to limit are soy sauce, potato chips, and soup  Do not add salt to food you cook  Limit your use of table salt  · Eat high-fiber foods  Foods that are a good source of fiber include vegetables, whole grain bread, and beans  · Limit alcohol  Alcohol affects your blood sugar level and can make it harder to manage your diabetes  Women should limit alcohol to 1 drink a day  Men should limit alcohol to 2 drinks a day  A drink of alcohol is 12 ounces of beer, 5 ounces of wine, or 1½ ounces of liquor  · Get regular exercise  Exercise can help keep your blood sugar level steady, decrease your risk of heart disease, and help you lose weight  Exercise for at least 30 minutes, 5 days a week  Include muscle strengthening activities 2 days each week  Work with your healthcare provider to create an exercise plan  · Check your feet each day  for injuries or open sores  Ask your healthcare provider for activities you can do if you have an open sore  · Quit smoking  If you smoke, it is never too late to quit  Smoking can worsen the problems that may occur with diabetes  Ask your healthcare provider for information about how to stop smoking if you are having trouble quitting  · Ask about your weight:  Ask healthcare providers if you need to lose weight, and how much to lose  Ask them to help you with a weight loss program  Even a 10 to 15 pound weight loss can help you manage your blood sugar level  · Carry medical alert identification  Wear medical alert jewelry or carry a card that says you have diabetes  Ask your healthcare provider where to get these items  · Ask about vaccines  Diabetes puts you at risk of serious illness if you get the flu, pneumonia, or hepatitis   Ask your healthcare provider if you should get a flu, pneumonia, or hepatitis B vaccine, and when to get the vaccine  Follow up with your healthcare provider as directed:  Write down your questions so you remember to ask them during your visits  CARE AGREEMENT:   You have the right to help plan your care  Learn about your health condition and how it may be treated  Discuss treatment options with your caregivers to decide what care you want to receive  You always have the right to refuse treatment  The above information is an  only  It is not intended as medical advice for individual conditions or treatments  Talk to your doctor, nurse or pharmacist before following any medical regimen to see if it is safe and effective for you  © 2014 [address] is for End User's use only and may not be sold, redistributed or otherwise used for commercial purposes  All illustrations and images included in CareNotes® are the copyrighted property of [employer]  or Dar Chinchilla

## 2022-03-04 ENCOUNTER — APPOINTMENT (OUTPATIENT)
Dept: LAB | Facility: CLINIC | Age: 72
End: 2022-03-04
Payer: MEDICARE

## 2022-03-04 DIAGNOSIS — Z79.4 TYPE 2 DIABETES MELLITUS WITHOUT COMPLICATION, WITH LONG-TERM CURRENT USE OF INSULIN (HCC): ICD-10-CM

## 2022-03-04 DIAGNOSIS — E11.9 TYPE 2 DIABETES MELLITUS WITHOUT COMPLICATION, WITH LONG-TERM CURRENT USE OF INSULIN (HCC): ICD-10-CM

## 2022-03-04 DIAGNOSIS — I10 ESSENTIAL HYPERTENSION: ICD-10-CM

## 2022-03-04 DIAGNOSIS — Z12.5 SCREENING FOR MALIGNANT NEOPLASM OF PROSTATE: ICD-10-CM

## 2022-03-04 LAB
ALBUMIN SERPL BCP-MCNC: 4.2 G/DL (ref 3.5–5)
ALP SERPL-CCNC: 90 U/L (ref 46–116)
ALT SERPL W P-5'-P-CCNC: 55 U/L (ref 12–78)
ANION GAP SERPL CALCULATED.3IONS-SCNC: 1 MMOL/L (ref 4–13)
AST SERPL W P-5'-P-CCNC: 29 U/L (ref 5–45)
BASOPHILS # BLD AUTO: 0.02 THOUSANDS/ΜL (ref 0–0.1)
BASOPHILS NFR BLD AUTO: 0 % (ref 0–1)
BILIRUB SERPL-MCNC: 0.78 MG/DL (ref 0.2–1)
BUN SERPL-MCNC: 15 MG/DL (ref 5–25)
CALCIUM SERPL-MCNC: 9.4 MG/DL (ref 8.3–10.1)
CHLORIDE SERPL-SCNC: 108 MMOL/L (ref 100–108)
CHOLEST SERPL-MCNC: 123 MG/DL
CO2 SERPL-SCNC: 30 MMOL/L (ref 21–32)
CREAT SERPL-MCNC: 0.8 MG/DL (ref 0.6–1.3)
EOSINOPHIL # BLD AUTO: 0.12 THOUSAND/ΜL (ref 0–0.61)
EOSINOPHIL NFR BLD AUTO: 3 % (ref 0–6)
ERYTHROCYTE [DISTWIDTH] IN BLOOD BY AUTOMATED COUNT: 14.6 % (ref 11.6–15.1)
EST. AVERAGE GLUCOSE BLD GHB EST-MCNC: 143 MG/DL
GFR SERPL CREATININE-BSD FRML MDRD: 89 ML/MIN/1.73SQ M
GLUCOSE P FAST SERPL-MCNC: 155 MG/DL (ref 65–99)
HBA1C MFR BLD: 6.6 %
HCT VFR BLD AUTO: 46.2 % (ref 36.5–49.3)
HDLC SERPL-MCNC: 54 MG/DL
HGB BLD-MCNC: 15.4 G/DL (ref 12–17)
IMM GRANULOCYTES # BLD AUTO: 0.01 THOUSAND/UL (ref 0–0.2)
IMM GRANULOCYTES NFR BLD AUTO: 0 % (ref 0–2)
LDLC SERPL CALC-MCNC: 45 MG/DL (ref 0–100)
LYMPHOCYTES # BLD AUTO: 0.88 THOUSANDS/ΜL (ref 0.6–4.47)
LYMPHOCYTES NFR BLD AUTO: 19 % (ref 14–44)
MCH RBC QN AUTO: 28.4 PG (ref 26.8–34.3)
MCHC RBC AUTO-ENTMCNC: 33.3 G/DL (ref 31.4–37.4)
MCV RBC AUTO: 85 FL (ref 82–98)
MONOCYTES # BLD AUTO: 0.62 THOUSAND/ΜL (ref 0.17–1.22)
MONOCYTES NFR BLD AUTO: 13 % (ref 4–12)
NEUTROPHILS # BLD AUTO: 3.01 THOUSANDS/ΜL (ref 1.85–7.62)
NEUTS SEG NFR BLD AUTO: 65 % (ref 43–75)
NONHDLC SERPL-MCNC: 69 MG/DL
NRBC BLD AUTO-RTO: 0 /100 WBCS
PLATELET # BLD AUTO: 186 THOUSANDS/UL (ref 149–390)
PMV BLD AUTO: 10.9 FL (ref 8.9–12.7)
POTASSIUM SERPL-SCNC: 4.5 MMOL/L (ref 3.5–5.3)
PROT SERPL-MCNC: 8.5 G/DL (ref 6.4–8.2)
PSA SERPL-MCNC: 1.2 NG/ML (ref 0–4)
RBC # BLD AUTO: 5.42 MILLION/UL (ref 3.88–5.62)
SODIUM SERPL-SCNC: 139 MMOL/L (ref 136–145)
TRIGL SERPL-MCNC: 120 MG/DL
WBC # BLD AUTO: 4.66 THOUSAND/UL (ref 4.31–10.16)

## 2022-03-04 PROCEDURE — 80061 LIPID PANEL: CPT

## 2022-03-04 PROCEDURE — 83036 HEMOGLOBIN GLYCOSYLATED A1C: CPT

## 2022-03-04 PROCEDURE — G0103 PSA SCREENING: HCPCS

## 2022-03-04 PROCEDURE — 85025 COMPLETE CBC W/AUTO DIFF WBC: CPT

## 2022-03-04 PROCEDURE — 36415 COLL VENOUS BLD VENIPUNCTURE: CPT

## 2022-03-04 PROCEDURE — 80053 COMPREHEN METABOLIC PANEL: CPT

## 2022-07-21 ENCOUNTER — RA CDI HCC (OUTPATIENT)
Dept: OTHER | Facility: HOSPITAL | Age: 72
End: 2022-07-21

## 2022-07-21 NOTE — PROGRESS NOTES
[address]  coding opportunities       Chart reviewed, no opportunity found: CHART REVIEWED, NO OPPORTUNITY FOUND        Patients Insurance     Medicare Insurance: Medicare

## 2022-07-27 ENCOUNTER — OFFICE VISIT (OUTPATIENT)
Dept: FAMILY MEDICINE CLINIC | Facility: CLINIC | Age: 72
End: 2022-07-27
Payer: MEDICARE

## 2022-07-27 VITALS
TEMPERATURE: 98 F | DIASTOLIC BLOOD PRESSURE: 82 MMHG | WEIGHT: 182 LBS | BODY MASS INDEX: 26.05 KG/M2 | HEIGHT: 70 IN | HEART RATE: 80 BPM | SYSTOLIC BLOOD PRESSURE: 130 MMHG

## 2022-07-27 DIAGNOSIS — Z12.2 ENCOUNTER FOR SCREENING FOR LUNG CANCER: ICD-10-CM

## 2022-07-27 DIAGNOSIS — L30.9 ECZEMA, UNSPECIFIED TYPE: ICD-10-CM

## 2022-07-27 DIAGNOSIS — D12.6 ADENOMATOUS POLYP OF COLON, UNSPECIFIED PART OF COLON: ICD-10-CM

## 2022-07-27 DIAGNOSIS — E11.9 TYPE 2 DIABETES MELLITUS WITHOUT COMPLICATION, WITH LONG-TERM CURRENT USE OF INSULIN (HCC): ICD-10-CM

## 2022-07-27 DIAGNOSIS — Q25.1 ABDOMINAL AORTIC STENOSIS: ICD-10-CM

## 2022-07-27 DIAGNOSIS — I70.213 ATHEROSCLEROSIS OF NATIVE ARTERIES OF EXTREMITIES WITH INTERMITTENT CLAUDICATION, BILATERAL LEGS (HCC): ICD-10-CM

## 2022-07-27 DIAGNOSIS — J44.9 MODERATE COPD (CHRONIC OBSTRUCTIVE PULMONARY DISEASE) (HCC): ICD-10-CM

## 2022-07-27 DIAGNOSIS — Z00.00 MEDICARE ANNUAL WELLNESS VISIT, SUBSEQUENT: Primary | ICD-10-CM

## 2022-07-27 DIAGNOSIS — Z79.4 TYPE 2 DIABETES MELLITUS WITHOUT COMPLICATION, WITH LONG-TERM CURRENT USE OF INSULIN (HCC): ICD-10-CM

## 2022-07-27 DIAGNOSIS — I10 PRIMARY HYPERTENSION: ICD-10-CM

## 2022-07-27 DIAGNOSIS — I77.1 BILATERAL ILIAC ARTERY STENOSIS (HCC): ICD-10-CM

## 2022-07-27 DIAGNOSIS — L30.9 PERIANAL DERMATITIS: ICD-10-CM

## 2022-07-27 DIAGNOSIS — Z87.891 PERSONAL HISTORY OF NICOTINE DEPENDENCE: ICD-10-CM

## 2022-07-27 LAB
CREAT UR-MCNC: 25.1 MG/DL
MICROALBUMIN UR-MCNC: 9.7 MG/L (ref 0–20)
MICROALBUMIN/CREAT 24H UR: 39 MG/G CREATININE (ref 0–30)
SL AMB POCT HEMOGLOBIN AIC: 6.7 (ref ?–6.5)

## 2022-07-27 PROCEDURE — 82570 ASSAY OF URINE CREATININE: CPT | Performed by: FAMILY MEDICINE

## 2022-07-27 PROCEDURE — 83036 HEMOGLOBIN GLYCOSYLATED A1C: CPT | Performed by: FAMILY MEDICINE

## 2022-07-27 PROCEDURE — 99215 OFFICE O/P EST HI 40 MIN: CPT | Performed by: FAMILY MEDICINE

## 2022-07-27 PROCEDURE — G0439 PPPS, SUBSEQ VISIT: HCPCS | Performed by: FAMILY MEDICINE

## 2022-07-27 PROCEDURE — 82043 UR ALBUMIN QUANTITATIVE: CPT | Performed by: FAMILY MEDICINE

## 2022-07-27 RX ORDER — TRIAMCINOLONE ACETONIDE 1 MG/G
CREAM TOPICAL 2 TIMES DAILY
Qty: 30 G | Refills: 3 | Status: SHIPPED | OUTPATIENT
Start: 2022-07-27

## 2022-07-27 NOTE — ASSESSMENT & PLAN NOTE
Pt without claudication symptoms at present  Pt still smoking  Urged smoking cessation  Continue present meds  Recheck vasc studies of aorta and lower extremities   Recheck 6m

## 2022-07-27 NOTE — PATIENT INSTRUCTIONS
Medicare Preventive Visit Patient Instructions  Thank you for completing your Welcome to Medicare Visit or Medicare Annual Wellness Visit today  Your next wellness visit will be due in one year (7/28/2023)  The screening/preventive services that you may require over the next 5-10 years are detailed below  Some tests may not apply to you based off risk factors and/or age  Screening tests ordered at today's visit but not completed yet may show as past due  Also, please note that scanned in results may not display below  Preventive Screenings:  Service Recommendations Previous Testing/Comments   Colorectal Cancer Screening  · Colonoscopy    · Fecal Occult Blood Test (FOBT)/Fecal Immunochemical Test (FIT)  · Fecal DNA/Cologuard Test  · Flexible Sigmoidoscopy Age: 54-65 years old   Colonoscopy: every 10 years (May be performed more frequently if at higher risk)  OR  FOBT/FIT: every 1 year  OR  Cologuard: every 3 years  OR  Sigmoidoscopy: every 5 years  Screening may be recommended earlier than age 48 if at higher risk for colorectal cancer  Also, an individualized decision between you and your healthcare provider will decide whether screening between the ages of 74-80 would be appropriate   Colonoscopy: 12/20/2018  FOBT/FIT: Not on file  Cologuard: Not on file  Sigmoidoscopy: Not on file    Screening Current     Prostate Cancer Screening Individualized decision between patient and health care provider in men between ages of 53-78   Medicare will cover every 12 months beginning on the day after your 50th birthday PSA: 1 2 ng/mL     Screening Current     Hepatitis C Screening Once for adults born between 1945 and 1965  More frequently in patients at high risk for Hepatitis C Hep C Antibody: Not on file        Diabetes Screening 1-2 times per year if you're at risk for diabetes or have pre-diabetes Fasting glucose: 155 mg/dL   A1C: 6 6 %    Screening Not Indicated  History Diabetes   Cholesterol Screening Once every 5 years if you don't have a lipid disorder  May order more often based on risk factors  Lipid panel: 03/04/2022    Screening Current      Other Preventive Screenings Covered by Medicare:  1  Abdominal Aortic Aneurysm (AAA) Screening: covered once if your at risk  You're considered to be at risk if you have a family history of AAA or a male between the age of 73-68 who smoking at least 100 cigarettes in your lifetime  2  Lung Cancer Screening: covers low dose CT scan once per year if you meet all of the following conditions: (1) Age 50-69; (2) No signs or symptoms of lung cancer; (3) Current smoker or have quit smoking within the last 15 years; (4) You have a tobacco smoking history of at least 30 pack years (packs per day x number of years you smoked); (5) You get a written order from a healthcare provider  3  Glaucoma Screening: covered annually if you're considered high risk: (1) You have diabetes OR (2) Family history of glaucoma OR (3)  aged 48 and older OR (3)  American aged 72 and older  3  Osteoporosis Screening: covered every 2 years if you meet one of the following conditions: (1) Have a vertebral abnormality; (2) On glucocorticoid therapy for more than 3 months; (3) Have primary hyperparathyroidism; (4) On osteoporosis medications and need to assess response to drug therapy  5  HIV Screening: covered annually if you're between the age of 12-76  Also covered annually if you are younger than 13 and older than 72 with risk factors for HIV infection  For pregnant patients, it is covered up to 3 times per pregnancy      Immunizations:  Immunization Recommendations   Influenza Vaccine Annual influenza vaccination during flu season is recommended for all persons aged >= 6 months who do not have contraindications   Pneumococcal Vaccine (Prevnar and Pneumovax)  * Prevnar = PCV13  * Pneumovax = PPSV23 Adults 25-60 years old: 1-3 doses may be recommended based on certain risk factors  Adults 72 years old: Prevnar (PCV13) vaccine recommended followed by Pneumovax (PPSV23) vaccine  If already received PPSV23 since turning 65, then PCV13 recommended at least one year after PPSV23 dose  Hepatitis B Vaccine 3 dose series if at intermediate or high risk (ex: diabetes, end stage renal disease, liver disease)   Tetanus (Td) Vaccine - COST NOT COVERED BY MEDICARE PART B Following completion of primary series, a booster dose should be given every 10 years to maintain immunity against tetanus  Td may also be given as tetanus wound prophylaxis  Tdap Vaccine - COST NOT COVERED BY MEDICARE PART B Recommended at least once for all adults  For pregnant patients, recommended with each pregnancy  Shingles Vaccine (Shingrix) - COST NOT COVERED BY MEDICARE PART B  2 shot series recommended in those aged 48 and above     Health Maintenance Due:      Topic Date Due    Hepatitis C Screening  Never done    Colorectal Cancer Screening  12/20/2021     Immunizations Due:      Topic Date Due    Pneumococcal Vaccine: 65+ Years (1 - PCV) Never done    COVID-19 Vaccine (4 - Booster for Quintana Peter series) 03/27/2022     Advance Directives   What are advance directives? Advance directives are legal documents that state your wishes and plans for medical care  These plans are made ahead of time in case you lose your ability to make decisions for yourself  Advance directives can apply to any medical decision, such as the treatments you want, and if you want to donate organs  What are the types of advance directives? There are many types of advance directives, and each state has rules about how to use them  You may choose a combination of any of the following:  · Living will: This is a written record of the treatment you want  You can also choose which treatments you do not want, which to limit, and which to stop at a certain time  This includes surgery, medicine, IV fluid, and tube feedings     · Durable power of  for [facility]): This is a written record that states who you want to make healthcare choices for you when you are unable to make them for yourself  This person, called a proxy, is usually a family member or a friend  You may choose more than 1 proxy  · Do not resuscitate (DNR) order:  A DNR order is used in case your heart stops beating or you stop breathing  It is a request not to have certain forms of treatment, such as CPR  A DNR order may be included in other types of advance directives  · Medical directive: This covers the care that you want if you are in a coma, near death, or unable to make decisions for yourself  You can list the treatments you want for each condition  Treatment may include pain medicine, surgery, blood transfusions, dialysis, IV or tube feedings, and a ventilator (breathing machine)  · Values history: This document has questions about your views, beliefs, and how you feel and think about life  This information can help others choose the care that you would choose  Why are advance directives important? An advance directive helps you control your care  Although spoken wishes may be used, it is better to have your wishes written down  Spoken wishes can be misunderstood, or not followed  Treatments may be given even if you do not want them  An advance directive may make it easier for your family to make difficult choices about your care  Cigarette Smoking and Your Health   Risks to your health if you smoke:  Nicotine and other chemicals found in tobacco damage every cell in your body  Even if you are a light smoker, you have an increased risk for cancer, heart disease, and lung disease  If you are pregnant or have diabetes, smoking increases your risk for complications  Benefits to your health if you stop smoking:   · You decrease respiratory symptoms such as coughing, wheezing, and shortness of breath     · You reduce your risk for cancers of the lung, mouth, throat, kidney, bladder, pancreas, stomach, and cervix  If you already have cancer, you increase the benefits of chemotherapy  You also reduce your risk for cancer returning or a second cancer from developing  · You reduce your risk for heart disease, blood clots, heart attack, and stroke  · You reduce your risk for lung infections, and diseases such as pneumonia, asthma, chronic bronchitis, and emphysema  · Your circulation improves  More oxygen can be delivered to your body  If you have diabetes, you lower your risk for complications, such as kidney, artery, and eye diseases  You also lower your risk for nerve damage  Nerve damage can lead to amputations, poor vision, and blindness  · You improve your body's ability to heal and to fight infections  For more information and support to stop smoking:   · [employer]  Phone: 3- 840 - [phone]  Web Address: [employer]  Weight Management   Why it is important to manage your weight:  Being overweight increases your risk of health conditions such as heart disease, high blood pressure, type 2 diabetes, and certain types of cancer  It can also increase your risk for osteoarthritis, sleep apnea, and other respiratory problems  Aim for a slow, steady weight loss  Even a small amount of weight loss can lower your risk of health problems  How to lose weight safely:  A safe and healthy way to lose weight is to eat fewer calories and get regular exercise  You can lose up about 1 pound a week by decreasing the number of calories you eat by 500 calories each day  Healthy meal plan for weight management:  A healthy meal plan includes a variety of foods, contains fewer calories, and helps you stay healthy  A healthy meal plan includes the following:  · Eat whole-grain foods more often  A healthy meal plan should contain fiber  Fiber is the part of grains, fruits, and vegetables that is not broken down by your body   Whole-grain foods are healthy and provide extra fiber in your diet  Some examples of whole-grain foods are whole-wheat breads and pastas, oatmeal, brown rice, and bulgur  · Eat a variety of vegetables every day  Include dark, leafy greens such as spinach, kale, caity greens, and mustard greens  Eat yellow and orange vegetables such as carrots, sweet potatoes, and winter squash  · Eat a variety of fruits every day  Choose fresh or canned fruit (canned in its own juice or light syrup) instead of juice  Fruit juice has very little or no fiber  · Eat low-fat dairy foods  Drink fat-free (skim) milk or 1% milk  Eat fat-free yogurt and low-fat cottage cheese  Try low-fat cheeses such as mozzarella and other reduced-fat cheeses  · Choose meat and other protein foods that are low in fat  Choose beans or other legumes such as split peas or lentils  Choose fish, skinless poultry (chicken or turkey), or lean cuts of red meat (beef or pork)  Before you cook meat or poultry, cut off any visible fat  · Use less fat and oil  Try baking foods instead of frying them  Add less fat, such as margarine, sour cream, regular salad dressing and mayonnaise to foods  Eat fewer high-fat foods  Some examples of high-fat foods include french fries, doughnuts, ice cream, and cakes  · Eat fewer sweets  Limit foods and drinks that are high in sugar  This includes candy, cookies, regular soda, and sweetened drinks  Exercise:  Exercise at least 30 minutes per day on most days of the week  Some examples of exercise include walking, biking, dancing, and swimming  You can also fit in more physical activity by taking the stairs instead of the elevator or parking farther away from stores  Ask your healthcare provider about the best exercise plan for you  © Copyright [employer] 2018 Information is for End User's use only and may not be sold, redistributed or otherwise used for commercial purposes   All illustrations and images included in CareNotes® are the copyrighted property of [employer]  or [facility] Preventive Visit Patient Instructions  Thank you for completing your Welcome to Medicare Visit or Medicare Annual Wellness Visit today  Your next wellness visit will be due in one year (7/28/2023)  The screening/preventive services that you may require over the next 5-10 years are detailed below  Some tests may not apply to you based off risk factors and/or age  Screening tests ordered at today's visit but not completed yet may show as past due  Also, please note that scanned in results may not display below  Preventive Screenings:  Service Recommendations Previous Testing/Comments   Colorectal Cancer Screening  · Colonoscopy    · Fecal Occult Blood Test (FOBT)/Fecal Immunochemical Test (FIT)  · Fecal DNA/Cologuard Test  · Flexible Sigmoidoscopy Age: 54-65 years old   Colonoscopy: every 10 years (May be performed more frequently if at higher risk)  OR  FOBT/FIT: every 1 year  OR  Cologuard: every 3 years  OR  Sigmoidoscopy: every 5 years  Screening may be recommended earlier than age 48 if at higher risk for colorectal cancer  Also, an individualized decision between you and your healthcare provider will decide whether screening between the ages of 74-80 would be appropriate   Colonoscopy: 12/20/2018  FOBT/FIT: Not on file  Cologuard: Not on file  Sigmoidoscopy: Not on file    Screening Current     Prostate Cancer Screening Individualized decision between patient and health care provider in men between ages of 53-78   Medicare will cover every 12 months beginning on the day after your 50th birthday PSA: 1 2 ng/mL     Screening Current     Hepatitis C Screening Once for adults born between 1945 and 1965  More frequently in patients at high risk for Hepatitis C Hep C Antibody: Not on file        Diabetes Screening 1-2 times per year if you're at risk for diabetes or have pre-diabetes Fasting glucose: 155 mg/dL   A1C: 6 6 %    Screening Not Indicated  History Diabetes   Cholesterol Screening Once every 5 years if you don't have a lipid disorder  May order more often based on risk factors  Lipid panel: 03/04/2022    Screening Current      Other Preventive Screenings Covered by Medicare:  6  Abdominal Aortic Aneurysm (AAA) Screening: covered once if your at risk  You're considered to be at risk if you have a family history of AAA or a male between the age of 73-68 who smoking at least 100 cigarettes in your lifetime  7  Lung Cancer Screening: covers low dose CT scan once per year if you meet all of the following conditions: (1) Age 50-69; (2) No signs or symptoms of lung cancer; (3) Current smoker or have quit smoking within the last 15 years; (4) You have a tobacco smoking history of at least 30 pack years (packs per day x number of years you smoked); (5) You get a written order from a healthcare provider  8  Glaucoma Screening: covered annually if you're considered high risk: (1) You have diabetes OR (2) Family history of glaucoma OR (3)  aged 48 and older OR (3)  American aged 72 and older  5  Osteoporosis Screening: covered every 2 years if you meet one of the following conditions: (1) Have a vertebral abnormality; (2) On glucocorticoid therapy for more than 3 months; (3) Have primary hyperparathyroidism; (4) On osteoporosis medications and need to assess response to drug therapy  10  HIV Screening: covered annually if you're between the age of 12-76  Also covered annually if you are younger than 13 and older than 72 with risk factors for HIV infection  For pregnant patients, it is covered up to 3 times per pregnancy      Immunizations:  Immunization Recommendations   Influenza Vaccine Annual influenza vaccination during flu season is recommended for all persons aged >= 6 months who do not have contraindications   Pneumococcal Vaccine (Prevnar and Pneumovax)  * Prevnar = PCV13  * Pneumovax = PPSV23 Adults 25-60 years old: 1-3 doses may be recommended based on certain risk factors  Adults 72 years old: Prevnar (PCV13) vaccine recommended followed by Pneumovax (PPSV23) vaccine  If already received PPSV23 since turning 65, then PCV13 recommended at least one year after PPSV23 dose  Hepatitis B Vaccine 3 dose series if at intermediate or high risk (ex: diabetes, end stage renal disease, liver disease)   Tetanus (Td) Vaccine - COST NOT COVERED BY MEDICARE PART B Following completion of primary series, a booster dose should be given every 10 years to maintain immunity against tetanus  Td may also be given as tetanus wound prophylaxis  Tdap Vaccine - COST NOT COVERED BY MEDICARE PART B Recommended at least once for all adults  For pregnant patients, recommended with each pregnancy  Shingles Vaccine (Shingrix) - COST NOT COVERED BY MEDICARE PART B  2 shot series recommended in those aged 48 and above     Health Maintenance Due:      Topic Date Due    Hepatitis C Screening  Never done    Colorectal Cancer Screening  12/20/2021     Immunizations Due:      Topic Date Due    Pneumococcal Vaccine: 65+ Years (1 - PCV) Never done    COVID-19 Vaccine (4 - Booster for Quintana Peter series) 03/27/2022     Advance Directives   What are advance directives? Advance directives are legal documents that state your wishes and plans for medical care  These plans are made ahead of time in case you lose your ability to make decisions for yourself  Advance directives can apply to any medical decision, such as the treatments you want, and if you want to donate organs  What are the types of advance directives? There are many types of advance directives, and each state has rules about how to use them  You may choose a combination of any of the following:  · Living will: This is a written record of the treatment you want  You can also choose which treatments you do not want, which to limit, and which to stop at a certain time  This includes surgery, medicine, IV fluid, and tube feedings  · Durable power of  for healthcare [facility]): This is a written record that states who you want to make healthcare choices for you when you are unable to make them for yourself  This person, called a proxy, is usually a family member or a friend  You may choose more than 1 proxy  · Do not resuscitate (DNR) order:  A DNR order is used in case your heart stops beating or you stop breathing  It is a request not to have certain forms of treatment, such as CPR  A DNR order may be included in other types of advance directives  · Medical directive: This covers the care that you want if you are in a coma, near death, or unable to make decisions for yourself  You can list the treatments you want for each condition  Treatment may include pain medicine, surgery, blood transfusions, dialysis, IV or tube feedings, and a ventilator (breathing machine)  · Values history: This document has questions about your views, beliefs, and how you feel and think about life  This information can help others choose the care that you would choose  Why are advance directives important? An advance directive helps you control your care  Although spoken wishes may be used, it is better to have your wishes written down  Spoken wishes can be misunderstood, or not followed  Treatments may be given even if you do not want them  An advance directive may make it easier for your family to make difficult choices about your care  Cigarette Smoking and Your Health   Risks to your health if you smoke:  Nicotine and other chemicals found in tobacco damage every cell in your body  Even if you are a light smoker, you have an increased risk for cancer, heart disease, and lung disease  If you are pregnant or have diabetes, smoking increases your risk for complications  Benefits to your health if you stop smoking:   · You decrease respiratory symptoms such as coughing, wheezing, and shortness of breath     · You reduce your risk for cancers of the lung, mouth, throat, kidney, bladder, pancreas, stomach, and cervix  If you already have cancer, you increase the benefits of chemotherapy  You also reduce your risk for cancer returning or a second cancer from developing  · You reduce your risk for heart disease, blood clots, heart attack, and stroke  · You reduce your risk for lung infections, and diseases such as pneumonia, asthma, chronic bronchitis, and emphysema  · Your circulation improves  More oxygen can be delivered to your body  If you have diabetes, you lower your risk for complications, such as kidney, artery, and eye diseases  You also lower your risk for nerve damage  Nerve damage can lead to amputations, poor vision, and blindness  · You improve your body's ability to heal and to fight infections  For more information and support to stop smoking:   · [employer]  Phone: 1- 337 - [phone]  Web Address: [employer]  Weight Management   Why it is important to manage your weight:  Being overweight increases your risk of health conditions such as heart disease, high blood pressure, type 2 diabetes, and certain types of cancer  It can also increase your risk for osteoarthritis, sleep apnea, and other respiratory problems  Aim for a slow, steady weight loss  Even a small amount of weight loss can lower your risk of health problems  How to lose weight safely:  A safe and healthy way to lose weight is to eat fewer calories and get regular exercise  You can lose up about 1 pound a week by decreasing the number of calories you eat by 500 calories each day  Healthy meal plan for weight management:  A healthy meal plan includes a variety of foods, contains fewer calories, and helps you stay healthy  A healthy meal plan includes the following:  · Eat whole-grain foods more often  A healthy meal plan should contain fiber  Fiber is the part of grains, fruits, and vegetables that is not broken down by your body   Whole-grain foods are healthy and provide extra fiber in your diet  Some examples of whole-grain foods are whole-wheat breads and pastas, oatmeal, brown rice, and bulgur  · Eat a variety of vegetables every day  Include dark, leafy greens such as spinach, kale, caity greens, and mustard greens  Eat yellow and orange vegetables such as carrots, sweet potatoes, and winter squash  · Eat a variety of fruits every day  Choose fresh or canned fruit (canned in its own juice or light syrup) instead of juice  Fruit juice has very little or no fiber  · Eat low-fat dairy foods  Drink fat-free (skim) milk or 1% milk  Eat fat-free yogurt and low-fat cottage cheese  Try low-fat cheeses such as mozzarella and other reduced-fat cheeses  · Choose meat and other protein foods that are low in fat  Choose beans or other legumes such as split peas or lentils  Choose fish, skinless poultry (chicken or turkey), or lean cuts of red meat (beef or pork)  Before you cook meat or poultry, cut off any visible fat  · Use less fat and oil  Try baking foods instead of frying them  Add less fat, such as margarine, sour cream, regular salad dressing and mayonnaise to foods  Eat fewer high-fat foods  Some examples of high-fat foods include french fries, doughnuts, ice cream, and cakes  · Eat fewer sweets  Limit foods and drinks that are high in sugar  This includes candy, cookies, regular soda, and sweetened drinks  Exercise:  Exercise at least 30 minutes per day on most days of the week  Some examples of exercise include walking, biking, dancing, and swimming  You can also fit in more physical activity by taking the stairs instead of the elevator or parking farther away from stores  Ask your healthcare provider about the best exercise plan for you  © Copyright [employer] 2018 Information is for End User's use only and may not be sold, redistributed or otherwise used for commercial purposes   All illustrations and images included in CareNotes® are the copyrighted property of [employer]  or [address] [place], [place] [address]esthela [facility]

## 2022-07-27 NOTE — ASSESSMENT & PLAN NOTE
I reviewed with pt  Start triamcinolone cream as directed x 2 weeks   Recheck 2w if not improved - earlier if worse

## 2022-07-27 NOTE — PROGRESS NOTES
Assessment and Plan:     Problem List Items Addressed This Visit        Digestive    Adenomatous polyp of colon     Refer back to GI for repeat colonoscopy         Relevant Orders    Ambulatory referral for colonoscopy       Endocrine    Type 2 diabetes mellitus without complication, with long-term current use of insulin ([facility] [address] )       Lab Results   Component Value Date    HGBA1C 6 7 (A) 07/27/2022   Remains well controlled  Continue present care  Urged diet control and weight loss  Recheck 6m         Relevant Orders    POCT hemoglobin A1c (Completed)    Microalbumin / creatinine urine ratio       Respiratory    Moderate COPD (chronic obstructive pulmonary disease) (HCC)     Still smoking  Discussed cessation  Continue to monitor  Recheck 6m - earlier if breathing worsens            Cardiovascular and Mediastinum    Abdominal aortic stenosis     Pt without claudication symptoms at present  Pt still smoking  Urged smoking cessation  Continue present meds  Recheck vasc studies of aorta and lower extremities  Recheck 6m         Relevant Orders    VAS lower limb arterial duplex, complete bilateral    VAS abdominal aorta/iliacs; complete study    Atherosclerosis of native arteries of extremities with intermittent claudication, bilateral legs (HCC)     Pt without claudication symptoms at present  Pt still smoking  Urged smoking cessation  Continue present meds  Recheck vasc studies of aorta and lower extremities  Recheck 6m         Bilateral iliac artery stenosis (HCC)     Pt without claudication symptoms at present  Pt still smoking  Urged smoking cessation  Continue present meds  Recheck vasc studies of aorta and lower extremities  Recheck 6m         Relevant Orders    VAS lower limb arterial duplex, complete bilateral    VAS abdominal aorta/iliacs; complete study    Hypertension     Well controlled  Cont present treatment  Monitor labs   Recheck 6m              Musculoskeletal and Integument    Eczema    Relevant Medications    triamcinolone (KENALOG) 0 1 % cream    Perianal dermatitis     I reviewed with pt  Start triamcinolone cream as directed x 2 weeks  Recheck 2w if not improved - earlier if worse         Relevant Medications    triamcinolone (KENALOG) 0 1 % cream      Other Visit Diagnoses     Medicare annual wellness visit, subsequent    -  Primary    Encounter for screening for lung cancer        Relevant Orders    CT lung screening program    Personal history of nicotine dependence        Relevant Orders    CT lung screening program           Preventive health issues were discussed with patient, and age appropriate screening tests were ordered as noted in patient's After Visit Summary  Personalized health advice and appropriate referrals for health education or preventive services given if needed, as noted in patient's After Visit Summary  History of Present Illness:     Patient presents for a Medicare Wellness Visit    f/u multiple med issues and AWV  - pt is feeling well  - pt has apart time job working at [employer]  Does some lifting and digging  Pt denies CP, palpitations, lightheadedness or other CV symptoms with or without exertion  - not consistent with diet  Overdue for eye exam   A1C = 6 7 today in office  - pt is still smoking approx 1ppd  No worsening SOB  - pt denies any worsening claudication symptoms  Up to date with vasc surgery  Due for repeat scan  - has rectal  lesion he would like checked  (+) pruritic  - pt continues to work on diet  Overdue for labs and ophth eval    - no Gu issues  - AWV done  - recent labs reviewed  Patient Care Team:  Clau Bryan MD as PCP - Regino Cota, MD Clau Johnson MD Clyda Naval, MD (Gastroenterology)  Randolph Varner MD as Endoscopist     Review of Systems:     Review of Systems   Constitutional: Negative  HENT: Negative  Eyes: Negative  Respiratory: Negative      Cardiovascular: Negative  Gastrointestinal: Negative  Endocrine: Negative  Genitourinary: Negative  Skin: Positive for rash (R perirectal dermatitis)  Allergic/Immunologic: Negative  Neurological: Negative  Hematological: Negative  Psychiatric/Behavioral: Negative  Problem List:     Patient Active Problem List   Diagnosis    Atherosclerosis of native arteries of extremities with intermittent claudication, bilateral legs (HCC)    Fatigue    Hypertension    Bilateral iliac artery stenosis (HCC)    Abdominal aortic stenosis    Moderate COPD (chronic obstructive pulmonary disease) (HCC)    Benign paroxysmal positional vertigo    Allergic rhinitis    Dyslipidemia    Stenosis of left renal artery (HCC)    Chronic pain of right knee    Skin cancer, basal cell    Aortic sclerosis    Type 2 diabetes mellitus without complication, with long-term current use of insulin ([facility])    Pulmonary function studies abnormal    Tobacco use disorder    Lung field abnormal    Eczema    Perianal dermatitis    Adenomatous polyp of colon      Past Medical and Surgical History:     Past Medical History:   Diagnosis Date    Colon polyp     COPD [facility]chronic obstructive pulmonary disease) ([facility] [address] )     Coronary artery disease     GERD (gastroesophageal reflux disease)     Hyperlipidemia     Hypertension     PVD (peripheral vascular disease) ([facility] )     Seasonal allergies      Past Surgical History:   Procedure Laterality Date    APPENDECTOMY  2016    CARDIAC SURGERY  2012    stents x3    COLONOSCOPY  2008    boonswang    KNEE ARTHROSCOPY Bilateral     post work injury    [place] COLONOSCOPY FLX DX W/COLLJ SPEC WHEN PFRMD N/A 12/20/2018    Procedure: COLONOSCOPY;  Surgeon: Ana Manrique MD;  Location: [place] [address] GI LAB;   Service: Gastroenterology    SHOULDER SURGERY Right     cheese tumor removed    TONSILLECTOMY      age 25      Family History:     Family History   Problem Relation Age of Onset    Heart disease Mother     Cancer Mother         breast    No Known Problems Father       Social History:     Social History     Socioeconomic History    Marital status: /Civil Union     Spouse name: None    Number of children: None    Years of education: None    Highest education level: None   Occupational History    None   Tobacco Use    Smoking status: Current Some Day Smoker     Packs/day: 0 25     Years: 50 00     Pack years: 12 50    Smokeless tobacco: Never Used   Vaping Use    Vaping Use: Never used   Substance and Sexual Activity    Alcohol use: No    Drug use: No    Sexual activity: None   Other Topics Concern    None   Social History Narrative    None     Social Determinants of Health     Financial Resource Strain: Not on file   Food Insecurity: Not on file   Transportation Needs: Not on file   Physical Activity: Not on file   Stress: Not on file   Social Connections: Not on file   Intimate Partner Violence: Not on file   Housing Stability: Not on file      Medications and Allergies:     Current Outpatient Medications   Medication Sig Dispense Refill    amLODIPine (NORVASC) 2 5 mg tablet Take 1 tablet (2 5 mg total) by mouth daily 90 tablet 3    aspirin (ECOTRIN LOW STRENGTH) 81 mg EC tablet Take 1 tablet (81 mg total) by mouth daily      atorvastatin (LIPITOR) 40 mg tablet TAKE ONE TABLET BY MOUTH EVERY DAY 90 tablet 3    loratadine (CLARITIN) 10 mg tablet Take 1 tablet (10 mg total) by mouth daily 30 tablet 1    Multiple Vitamins-Minerals (CENTRUM ULTRA MENS) TABS Take by mouth      Omega-3 Fatty Acids (Omega-3 Fish Oil) 500 MG CAPS Every 12 hours      triamcinolone (KENALOG) 0 1 % cream Apply topically 2 (two) times a day 30 g 3     No current facility-administered medications for this visit       Allergies   Allergen Reactions    Naproxen Swelling and Rash      Immunizations:     Immunization History   Administered Date(s) Administered    COVID-19 PFIZER VACCINE 0 3 ML IM 03/27/2021, 04/17/2021, 11/27/2021    Tdap 12/31/2021      Health Maintenance:         Topic Date Due    Hepatitis C Screening  Never done    Colorectal Cancer Screening  12/20/2021         Topic Date Due    Pneumococcal Vaccine: 65+ Years (1 - PCV) Never done    COVID-19 Vaccine (4 - Booster for [employer] series) 03/27/2022      Medicare Screening Tests and Risk Assessments:     Chela Perez is here for his Subsequent Wellness visit  Health Risk Assessment:   Patient rates overall health as good  Patient feels that their physical health rating is same  Patient is very satisfied with their life  Eyesight was rated as same  Hearing was rated as same  Patient feels that their emotional and mental health rating is same  Patients states they are never, rarely angry  Patient states they are never, rarely unusually tired/fatigued  Pain experienced in the last 7 days has been none  Patient states that he has experienced no weight loss or gain in last 6 months  Depression Screening:   PHQ-2 Score: 0      Fall Risk Screening: In the past year, patient has experienced: no history of falling in past year      Home Safety:  Patient does not have trouble with stairs inside or outside of their home  Patient has working smoke alarms and has working carbon monoxide detector  Home safety hazards include: none  Nutrition:   Current diet is Regular  Medications:   Patient is currently taking over-the-counter supplements  OTC medications include: see medication list  Patient is able to manage medications  Activities of Daily Living (ADLs)/Instrumental Activities of Daily Living (IADLs):   Walk and transfer into and out of bed and chair?: Yes  Dress and groom yourself?: Yes    Bathe or shower yourself?: Yes    Feed yourself?  Yes  Do your laundry/housekeeping?: Yes  Manage your money, pay your bills and track your expenses?: Yes  Make your own meals?: Yes    Do your own shopping?: Yes    Previous Hospitalizations:   Any hospitalizations or ED visits within the last 12 months?: No      Advance Care Planning:   Living will: No    Durable POA for healthcare: No    Advanced directive: No    Advanced directive counseling given: Yes      Cognitive Screening:   Provider or family/friend/caregiver concerned regarding cognition?: No    PREVENTIVE SCREENINGS      Cardiovascular Screening:    General: Screening Current      Diabetes Screening:     General: Screening Not Indicated and History Diabetes      Colorectal Cancer Screening:     General: Screening Current      Prostate Cancer Screening:    General: Screening Current      Osteoporosis Screening:    General: Screening Not Indicated      Abdominal Aortic Aneurysm (AAA) Screening:    Risk factors include: age between 73-69 yo and tobacco use        Lung Cancer Screening:     General: Screening Not Indicated      Hepatitis C Screening:    General: Risks and Benefits Discussed    Hep C Screening Accepted: Yes      Screening, Brief Intervention, and Referral to Treatment (SBIRT)    Screening    Typical number of drinks in a week: 0    AUDIT-C Screenin) How often did you have a drink containing alcohol in the past year? monthly or less  2) How many drinks did you have on a typical day when you were drinking in the past year? 1 to 2  3) How often did you have 6 or more drinks on one occasion in the past year? never    AUDIT-C Score: 1  Interpretation: Score 0-3 (male): Negative screen for alcohol misuse    Single Item Drug Screening:  How often have you used an illegal drug (including marijuana) or a prescription medication for non-medical reasons in the past year? never    Single Item Drug Screen Score: 0  Interpretation: Negative screen for possible drug use disorder    Other Counseling Topics:   Calcium and vitamin D intake and regular weightbearing exercise       No exam data present     Physical Exam:     /82   Pulse 80   Temp 98 °F (36 7 °C)   Ht 5' 10" (1 778 m)   Wt 82 6 kg (182 lb)   BMI 26 11 kg/m²     Physical Exam  Vitals reviewed  Constitutional:       Appearance: He is well-developed  HENT:      Head: Normocephalic and atraumatic  Right Ear: Tympanic membrane, ear canal and external ear normal       Left Ear: Tympanic membrane, ear canal and external ear normal       Mouth/Throat:      Mouth: Mucous membranes are moist    Eyes:      Extraocular Movements: Extraocular movements intact  Conjunctiva/sclera: Conjunctivae normal       Pupils: Pupils are equal, round, and reactive to light  Neck:      Thyroid: No thyromegaly  Vascular: No JVD  Cardiovascular:      Rate and Rhythm: Normal rate and regular rhythm  Pulses: Pulses are weak  Dorsalis pedis pulses are 1+ on the right side and 0 on the left side  Heart sounds: Normal heart sounds  No murmur heard  Pulmonary:      Effort: Pulmonary effort is normal  No respiratory distress  Breath sounds: Normal breath sounds  No wheezing or rales  Abdominal:      General: Bowel sounds are normal  There is no distension  Palpations: Abdomen is soft  There is no mass  Tenderness: There is no abdominal tenderness  Musculoskeletal:         General: No swelling, tenderness or deformity  Normal range of motion  Cervical back: Normal range of motion and neck supple  No muscular tenderness  Right lower leg: No edema  Left lower leg: No edema  Feet:      Right foot:      Skin integrity: No ulcer, skin breakdown, erythema, warmth, callus or dry skin  Left foot:      Skin integrity: No ulcer, skin breakdown, erythema, warmth, callus or dry skin  Lymphadenopathy:      Cervical: No cervical adenopathy  Skin:     Capillary Refill: Capillary refill takes less than 2 seconds  Findings: Rash (eczematous like area on the L lateral perianal area  no skin opening) present  Neurological:      Mental Status: He is alert and oriented to person, place, and time  Cranial Nerves: No cranial nerve deficit  Sensory: No sensory deficit  Motor: No weakness or abnormal muscle tone  Gait: Gait normal       Deep Tendon Reflexes: Reflexes normal       Comments: Minicog 5/5   Psychiatric:         Mood and Affect: Mood normal          Behavior: Behavior normal          Thought Content: Thought content normal          Judgment: Judgment normal       Comments: PHQ-2/9 Depression Screening    Little interest or pleasure in doing things: 0 - not at all  Feeling down, depressed, or hopeless: 0 - not at all  PHQ-2 Score: 0  PHQ-2 Interpretation: Negative depression screen             Patient's shoes and socks removed  Right Foot/Ankle   Right Foot Inspection  Skin Exam: skin normal and skin intact  No dry skin, no warmth, no callus, no erythema, no maceration, no abnormal color, no pre-ulcer, no ulcer and no callus  Toe Exam: ROM and strength within normal limits  Sensory   Vibration: intact  Monofilament testing: intact    Vascular  Capillary refills: < 3 seconds  The right DP pulse is 1+  Left Foot/Ankle  Left Foot Inspection  Skin Exam: skin normal and skin intact  No dry skin, no warmth, no erythema, no maceration, normal color, no pre-ulcer, no ulcer and no callus  Toe Exam: ROM and strength within normal limits  Sensory   Vibration: intact  Monofilament testing: intact    Vascular  Capillary refills: < 3 seconds  The left DP pulse is 0       Assign Risk Category  No deformity present  No loss of protective sensation  Weak pulses  Risk: 0      Cherylene Spear, MD

## 2022-07-27 NOTE — ASSESSMENT & PLAN NOTE
Lab Results   Component Value Date    HGBA1C 6 7 (A) 07/27/2022   Remains well controlled  Continue present care  Urged diet control and weight loss   Recheck 6m

## 2022-09-03 DIAGNOSIS — I10 ESSENTIAL HYPERTENSION: ICD-10-CM

## 2022-09-06 RX ORDER — AMLODIPINE BESYLATE 2.5 MG/1
TABLET ORAL
Qty: 90 TABLET | Refills: 3 | Status: SHIPPED | OUTPATIENT
Start: 2022-09-06

## 2022-10-25 ENCOUNTER — TELEPHONE (OUTPATIENT)
Dept: FAMILY MEDICINE CLINIC | Facility: CLINIC | Age: 72
End: 2022-10-25

## 2022-10-25 NOTE — TELEPHONE ENCOUNTER
Patient states he is concerned about his urine being discolored    He said on Saturday it was an orange in color   Since then it's become more of a yellow, but it's still not back to normal color    He denies any pain or swelling and he states there is no odor from his urine    He is asking for an appointment with you some time this week and he states he does not mind waiting until towards the end of the week if you can't get him in sooner    I offered an appointment with Nanda Barnard but he would prefer to see you    Please advise

## 2022-10-27 ENCOUNTER — OFFICE VISIT (OUTPATIENT)
Dept: FAMILY MEDICINE CLINIC | Facility: CLINIC | Age: 72
End: 2022-10-27

## 2022-10-27 ENCOUNTER — APPOINTMENT (OUTPATIENT)
Dept: LAB | Facility: CLINIC | Age: 72
End: 2022-10-27
Payer: MEDICARE

## 2022-10-27 VITALS
TEMPERATURE: 98.2 F | HEART RATE: 62 BPM | RESPIRATION RATE: 16 BRPM | SYSTOLIC BLOOD PRESSURE: 152 MMHG | DIASTOLIC BLOOD PRESSURE: 70 MMHG | BODY MASS INDEX: 26.2 KG/M2 | WEIGHT: 183 LBS | HEIGHT: 70 IN | OXYGEN SATURATION: 98 %

## 2022-10-27 DIAGNOSIS — E11.9 TYPE 2 DIABETES MELLITUS WITHOUT COMPLICATION, WITH LONG-TERM CURRENT USE OF INSULIN (HCC): ICD-10-CM

## 2022-10-27 DIAGNOSIS — Z79.4 TYPE 2 DIABETES MELLITUS WITHOUT COMPLICATION, WITH LONG-TERM CURRENT USE OF INSULIN (HCC): ICD-10-CM

## 2022-10-27 DIAGNOSIS — R31.9 HEMATURIA, UNSPECIFIED TYPE: Primary | ICD-10-CM

## 2022-10-27 DIAGNOSIS — I10 PRIMARY HYPERTENSION: ICD-10-CM

## 2022-10-27 DIAGNOSIS — R31.9 HEMATURIA, UNSPECIFIED TYPE: ICD-10-CM

## 2022-10-27 LAB
SL AMB  POCT GLUCOSE, UA: NEGATIVE
SL AMB LEUKOCYTE ESTERASE,UA: ABNORMAL
SL AMB POCT BILIRUBIN,UA: NEGATIVE
SL AMB POCT BLOOD,UA: 3
SL AMB POCT CLARITY,UA: CLEAR
SL AMB POCT COLOR,UA: YELLOW
SL AMB POCT HEMOGLOBIN AIC: 6.6 (ref ?–6.5)
SL AMB POCT KETONES,UA: NEGATIVE
SL AMB POCT NITRITE,UA: NEGATIVE
SL AMB POCT PH,UA: 5
SL AMB POCT SPECIFIC GRAVITY,UA: 1
SL AMB POCT URINE PROTEIN: ABNORMAL
SL AMB POCT UROBILINOGEN: 0.2

## 2022-10-27 PROCEDURE — 87086 URINE CULTURE/COLONY COUNT: CPT | Performed by: FAMILY MEDICINE

## 2022-10-27 NOTE — PROGRESS NOTES
Name: Sreekanth Reeder  : 1950      MRN: [id number]  Encounter Provider: Ji Smith MD  Encounter Date: 10/27/2022   Encounter department: [address]     1  Hematuria, unspecified type  Assessment & Plan:  Unclear cause  Urine shows persistent microscopic blood  Will check urine cytology as well as urine culture  Refer patient for ultrasound of the kidney and bladder  Consider Urology evaluation  Recheck 1-2 months-earlier if cytology or ultrasound her abnormal    Orders:  -     POCT urine dip  -     Cytology, urine; Future  -     US kidney and bladder with pvr; Future; Expected date: 10/27/2022  -     Urine culture    2  Type 2 diabetes mellitus without complication, with long-term current use of insulin [facility])  Assessment & Plan:    Lab Results   Component Value Date    HGBA1C 6 6 (A) 10/27/2022   Remains in good control  Continue present diet  Encouraged exercise and weight loss  Recheck 6 months    Orders:  -     POCT hemoglobin A1c    3  Primary hypertension           Subjective     Here for acute episode of hematuria  - 67-year-old male awoke Saturday morning, went to the bathroom and noticed bright red blood in his urine without clots  Patient denies any dysuria, or previous flank pain, frequency or history of kidney stones  Symptoms lasted all day but slowly improved as the day went on  By Saturday evening, urine was now origin and by  morning it was more of a deep gold color  Since then, urine appears to be back to normal   He denies any new urinary tract or GI complaints  Patient also denies any increased bruising, bleeding or other problems  Urinalysis done in our office does show 3+ red blood cells however as well as trace leukocytes  - pt continues to monitor his diet and remains active  A1C in the office today = 6 7   Pt denies any increased thirst or urination  - no other new concerns    Review of Systems   Constitutional: Negative  Respiratory: Negative  Cardiovascular: Negative  Gastrointestinal: Negative  Genitourinary: Positive for hematuria  Negative for difficulty urinating, dysuria, flank pain, frequency, penile pain and urgency  Musculoskeletal: Negative  Neurological: Negative  Past Medical History:   Diagnosis Date   • Colon polyp    • COPD (chronic obstructive pulmonary disease) ([facility] )    • Coronary artery disease    • GERD (gastroesophageal reflux disease)    • Hyperlipidemia    • Hypertension    • PVD (peripheral vascular disease) ([facility] )    • Seasonal allergies      Past Surgical History:   Procedure Laterality Date   • APPENDECTOMY  2016   • CARDIAC SURGERY  2012    stents x3   • COLONOSCOPY  2008    boonswang   • KNEE ARTHROSCOPY Bilateral     post work injury   • [place] COLONOSCOPY FLX DX W/COLLJ SPEC WHEN PFRMD N/A 12/20/2018    Procedure: COLONOSCOPY;  Surgeon: Daryle Silver, MD;  Location: [facility] GI LAB;   Service: Gastroenterology   • SHOULDER SURGERY Right     cheese tumor removed   • TONSILLECTOMY      age 25     Family History   Problem Relation Age of Onset   • Heart disease Mother    • Cancer Mother         breast   • No Known Problems Father      Social History     Socioeconomic History   • Marital status: /Civil Union     Spouse name: None   • Number of children: None   • Years of education: None   • Highest education level: None   Occupational History   • None   Tobacco Use   • Smoking status: Current Some Day Smoker     Packs/day: 0 25     Years: 50 00     Pack years: 12 50   • Smokeless tobacco: Never Used   Vaping Use   • Vaping Use: Never used   Substance and Sexual Activity   • Alcohol use: No   • Drug use: No   • Sexual activity: None   Other Topics Concern   • None   Social History Narrative   • None     Social Determinants of Health     Financial Resource Strain: Not on file   Food Insecurity: Not on file   Transportation Needs: Not on file   Physical Activity: Not on file   Stress: Not on file   Social Connections: Not on file   Intimate Partner Violence: Not on file   Housing Stability: Not on file     Current Outpatient Medications on File Prior to Visit   Medication Sig   • amLODIPine (NORVASC) 2 5 mg tablet TAKE ONE TABLET BY MOUTH EVERY DAY   • aspirin (ECOTRIN LOW STRENGTH) 81 mg EC tablet Take 1 tablet (81 mg total) by mouth daily   • atorvastatin (LIPITOR) 40 mg tablet TAKE ONE TABLET BY MOUTH EVERY DAY   • loratadine (CLARITIN) 10 mg tablet Take 1 tablet (10 mg total) by mouth daily   • Multiple Vitamins-Minerals (CENTRUM ULTRA MENS) TABS Take by mouth   • Omega-3 Fatty Acids (Omega-3 Fish Oil) 500 MG CAPS Every 12 hours   • triamcinolone (KENALOG) 0 1 % cream Apply topically 2 (two) times a day     Allergies   Allergen Reactions   • Naproxen Swelling and Rash     Immunization History   Administered Date(s) Administered   • COVID-19 PFIZER VACCINE 0 3 ML IM 03/27/2021, 04/17/2021, 11/27/2021   • Tdap 12/31/2021       Objective     /70   Pulse 62   Temp 98 2 °F (36 8 °C)   Resp 16   Ht 5' 10" (1 778 m)   Wt 83 kg (183 lb)   SpO2 98%   BMI 26 26 kg/m²     Physical Exam  Vitals reviewed  Eyes:      Extraocular Movements: Extraocular movements intact  Conjunctiva/sclera: Conjunctivae normal       Pupils: Pupils are equal, round, and reactive to light  Comments: No conjunctival pallor   Cardiovascular:      Rate and Rhythm: Normal rate and regular rhythm  Pulmonary:      Effort: Pulmonary effort is normal       Breath sounds: Normal breath sounds  Abdominal:      General: There is no distension  Palpations: There is no mass  Tenderness: There is no abdominal tenderness  There is no right CVA tenderness or left CVA tenderness  Musculoskeletal:      Cervical back: Normal range of motion  Right lower leg: No edema  Left lower leg: No edema  Lymphadenopathy:      Cervical: No cervical adenopathy     Skin: General: Skin is warm  Capillary Refill: Capillary refill takes less than 2 seconds  Neurological:      Mental Status: He is alert         Slava Quijano MD

## 2022-10-28 LAB — BACTERIA UR CULT: NORMAL

## 2022-10-30 PROBLEM — R31.9 HEMATURIA: Status: ACTIVE | Noted: 2022-10-30

## 2022-10-30 NOTE — ASSESSMENT & PLAN NOTE
Lab Results   Component Value Date    HGBA1C 6 6 (A) 10/27/2022   Remains in good control  Continue present diet  Encouraged exercise and weight loss    Recheck 6 months

## 2022-10-30 NOTE — ASSESSMENT & PLAN NOTE
Unclear cause  Urine shows persistent microscopic blood  Will check urine cytology as well as urine culture  Refer patient for ultrasound of the kidney and bladder  Consider Urology evaluation    Recheck 1-2 months-earlier if cytology or ultrasound her abnormal

## 2022-11-15 ENCOUNTER — IMMUNIZATIONS (OUTPATIENT)
Dept: FAMILY MEDICINE CLINIC | Facility: CLINIC | Age: 72
End: 2022-11-15

## 2022-11-15 DIAGNOSIS — Z23 IMMUNIZATION DUE: Primary | ICD-10-CM

## 2023-01-02 DIAGNOSIS — I70.212 ATHEROSCLER OF NATIVE ARTERY OF LEFT LEG WITH INTERMIT CLAUDICATION (HCC): ICD-10-CM

## 2023-01-03 RX ORDER — ATORVASTATIN CALCIUM 40 MG/1
TABLET, FILM COATED ORAL
Qty: 90 TABLET | Refills: 3 | Status: SHIPPED | OUTPATIENT
Start: 2023-01-03 | End: 2023-01-06

## 2023-01-06 DIAGNOSIS — I70.212 ATHEROSCLER OF NATIVE ARTERY OF LEFT LEG WITH INTERMIT CLAUDICATION (HCC): ICD-10-CM

## 2023-01-06 RX ORDER — ATORVASTATIN CALCIUM 40 MG/1
TABLET, FILM COATED ORAL
Qty: 90 TABLET | Refills: 3 | Status: SHIPPED | OUTPATIENT
Start: 2023-01-06

## 2023-02-06 ENCOUNTER — HOSPITAL ENCOUNTER (OUTPATIENT)
Dept: CT IMAGING | Facility: HOSPITAL | Age: 73
Discharge: HOME | End: 2023-02-06

## 2023-02-06 DIAGNOSIS — Z87.891 PERSONAL HISTORY OF NICOTINE DEPENDENCE: ICD-10-CM

## 2023-02-06 DIAGNOSIS — Z12.2 ENCOUNTER FOR SCREENING FOR LUNG CANCER: ICD-10-CM

## 2023-02-10 ENCOUNTER — HOSPITAL ENCOUNTER (OUTPATIENT)
Dept: ULTRASOUND IMAGING | Facility: HOSPITAL | Age: 73
Discharge: HOME | End: 2023-02-10

## 2023-02-10 DIAGNOSIS — R31.9 HEMATURIA, UNSPECIFIED TYPE: ICD-10-CM

## 2023-08-25 NOTE — PROGRESS NOTES
BMI Counseling: Body mass index is 27 98 kg/m²  The BMI is above normal  Nutrition recommendations include reducing portion sizes, consuming healthier snacks, moderation in carbohydrate intake, increasing intake of lean protein, reducing intake of saturated fat and trans fat and reducing intake of cholesterol  Exercise recommendations include exercising 3-5 times per week  Tobacco Cessation Counseling: Tobacco cessation counseling and education was provided  The patient is sincerely urged to quit consumption of tobacco  He is ready to quit tobacco and is continuing to wean   Recheck 6m Patient seeing new family medicine provider.

## 2023-09-16 DIAGNOSIS — I10 ESSENTIAL HYPERTENSION: ICD-10-CM

## 2023-09-18 RX ORDER — AMLODIPINE BESYLATE 2.5 MG/1
TABLET ORAL
Qty: 90 TABLET | Refills: 0 | Status: SHIPPED | OUTPATIENT
Start: 2023-09-18

## 2023-10-27 ENCOUNTER — IMMUNIZATIONS (OUTPATIENT)
Dept: FAMILY MEDICINE CLINIC | Facility: CLINIC | Age: 73
End: 2023-10-27
Payer: MEDICARE

## 2023-10-27 DIAGNOSIS — Z23 ENCOUNTER FOR IMMUNIZATION: Primary | ICD-10-CM

## 2023-10-27 PROCEDURE — G0008 ADMIN INFLUENZA VIRUS VAC: HCPCS

## 2023-10-27 PROCEDURE — 90662 IIV NO PRSV INCREASED AG IM: CPT

## 2024-01-17 DIAGNOSIS — I70.212 ATHEROSCLER OF NATIVE ARTERY OF LEFT LEG WITH INTERMIT CLAUDICATION (HCC): ICD-10-CM

## 2024-01-17 RX ORDER — ATORVASTATIN CALCIUM 40 MG/1
TABLET, FILM COATED ORAL
Qty: 90 TABLET | Refills: 3 | Status: SHIPPED | OUTPATIENT
Start: 2024-01-17

## 2024-01-24 NOTE — ASSESSMENT & PLAN NOTE
Poor pedal pulses though patient is asymptomatic  Follow-up with vascular surgery  Continue to monitor    Recheck 6 months none

## 2024-02-03 DIAGNOSIS — I10 ESSENTIAL HYPERTENSION: ICD-10-CM

## 2024-02-04 RX ORDER — AMLODIPINE BESYLATE 2.5 MG/1
TABLET ORAL
Qty: 30 TABLET | Refills: 0 | Status: SHIPPED | OUTPATIENT
Start: 2024-02-04

## 2024-02-07 ENCOUNTER — TELEPHONE (OUTPATIENT)
Dept: FAMILY MEDICINE CLINIC | Facility: CLINIC | Age: 74
End: 2024-02-07

## 2024-02-08 NOTE — TELEPHONE ENCOUNTER
AWV scheduled  
Please call patient to schedule follow up appointment.  Patient has not been seen in the office since 2022.  Patient will get no further refills until he is seen.  
[phone]

## 2024-02-19 ENCOUNTER — HOSPITAL ENCOUNTER (EMERGENCY)
Facility: HOSPITAL | Age: 74
Discharge: HOME | End: 2024-02-19
Attending: INTERNAL MEDICINE
Payer: MEDICARE

## 2024-02-19 VITALS
RESPIRATION RATE: 18 BRPM | DIASTOLIC BLOOD PRESSURE: 81 MMHG | HEART RATE: 104 BPM | OXYGEN SATURATION: 97 % | WEIGHT: 180 LBS | SYSTOLIC BLOOD PRESSURE: 174 MMHG | BODY MASS INDEX: 25.2 KG/M2 | HEIGHT: 71 IN | TEMPERATURE: 97.6 F

## 2024-02-19 DIAGNOSIS — S61.452A DOG BITE OF LEFT HAND, INITIAL ENCOUNTER: Primary | ICD-10-CM
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
## 2024-02-19 PROCEDURE — 96372 THER/PROPH/DIAG INJ SC/IM: CPT

## 2024-02-19 PROCEDURE — 99284 EMERGENCY DEPT VISIT MOD MDM: CPT | Performed by: INTERNAL MEDICINE

## 2024-02-19 PROCEDURE — 99283 EMERGENCY DEPT VISIT LOW MDM: CPT

## 2024-02-19 PROCEDURE — 90471 IMMUNIZATION ADMIN: CPT

## 2024-02-19 PROCEDURE — 90675 RABIES VACCINE IM: CPT | Performed by: INTERNAL MEDICINE

## 2024-02-19 PROCEDURE — 90375 RABIES IG IM/SC: CPT | Performed by: INTERNAL MEDICINE

## 2024-02-19 RX ORDER — GINSENG 100 MG
1 CAPSULE ORAL ONCE
Status: COMPLETED | OUTPATIENT
Start: 2024-02-19 | End: 2024-02-19

## 2024-02-19 RX ORDER — AMOXICILLIN AND CLAVULANATE POTASSIUM 875; 125 MG/1; MG/1
1 TABLET, FILM COATED ORAL ONCE
Status: COMPLETED | OUTPATIENT
Start: 2024-02-19 | End: 2024-02-19

## 2024-02-19 RX ORDER — AMOXICILLIN AND CLAVULANATE POTASSIUM 875; 125 MG/1; MG/1
1 TABLET, FILM COATED ORAL EVERY 12 HOURS
Qty: 10 TABLET | Refills: 0 | Status: SHIPPED | OUTPATIENT
Start: 2024-02-19 | End: 2024-02-24

## 2024-02-19 RX ADMIN — AMOXICILLIN AND CLAVULANATE POTASSIUM 1 TABLET: 875; 125 TABLET, FILM COATED ORAL at 10:23

## 2024-02-19 RX ADMIN — RABIES VIRUS STRAIN PM-1503-3M ANTIGEN (PROPIOLACTONE INACTIVATED) AND WATER 1 ML: KIT at 10:19

## 2024-02-19 RX ADMIN — BACITRACIN ZINC 1 SMALL APPLICATION: 500 OINTMENT TOPICAL at 10:49

## 2024-02-19 RX ADMIN — RABIES IMMUNE GLOBULIN (HUMAN) 1500 UNITS: 300 INJECTION, SOLUTION INFILTRATION; INTRAMUSCULAR at 10:18

## 2024-02-19 NOTE — ED PROVIDER NOTES
History  Chief Complaint   Patient presents with    Dog Bite     Pt reports he was bit in the left hand by a stray dog. Unsure of last Tetanus.     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  A person brought a stray dog , and when he put the barr, the dog bite him on dorsum and ulnar side of the L hand. The laceration are superfacial and skin is very thin. Pt had h/o of CAD and he is on Aspirin. Talking to the pt about vaccine, and pt stated his last tetanus was 2021, and he does not want to take Rabies vaccine as pt will keep the dog and observe his behavior and will try to reach his owner to find out his vaccination status. Pt agreed to take only rabies immunoglobulin today.         Prior to Admission Medications   Prescriptions Last Dose Informant Patient Reported? Taking?   Multiple Vitamins-Minerals (CENTRUM ULTRA MENS) TABS  Self Yes No   Sig: Take by mouth   Omega-3 Fatty Acids (Omega-3 Fish Oil) 500 MG CAPS  Self Yes No   Sig: Every 12 hours   amLODIPine (NORVASC) 2.5 mg tablet   No No   Sig: TAKE ONE TABLET BY MOUTH EVERY DAY   aspirin (ECOTRIN LOW STRENGTH) 81 mg EC tablet  Self No No   Sig: Take 1 tablet (81 mg total) by mouth daily   atorvastatin (LIPITOR) 40 mg tablet   No No   Sig: TAKE ONE TABLET BY MOUTH EVERY DAY   loratadine (CLARITIN) 10 mg tablet  Self No No   Sig: Take 1 tablet (10 mg total) by mouth daily   triamcinolone (KENALOG) 0.1 % cream  Self No No   Sig: Apply topically 2 (two) times a day      Facility-Administered Medications: None       Past Medical History:   Diagnosis Date    Colon polyp     COPD (chronic obstructive pulmonary disease) (HCC)     Coronary artery disease     GERD (gastroesophageal reflux disease)     Hyperlipidemia     Hypertension     PVD (peripheral vascular disease) ([facility])     Seasonal allergies        Past Surgical History:   Procedure Laterality Date    APPENDECTOMY  2016    CARDIAC SURGERY  2012    stents x3    COLONOSCOPY  2008     boonswang    KNEE ARTHROSCOPY Bilateral     post work injury    ID COLONOSCOPY FLX DX W/COLLJ SPEC WHEN PFRMD N/A 12/20/2018    Procedure: COLONOSCOPY;  Surgeon: Joss Castillo MD;  Location: [facility];  Service: Gastroenterology    SHOULDER SURGERY Right     cheese tumor removed    TONSILLECTOMY      age 22       Family History   Problem Relation Age of Onset    Heart disease Mother     Cancer Mother         breast    No Known Problems Father      I have reviewed and agree with the history as documented.    E-Cigarette/Vaping    E-Cigarette Use Never User      E-Cigarette/Vaping Substances     Social History     Tobacco Use    Smoking status: Some Days     Current packs/day: 0.25     Average packs/day: 0.3 packs/day for 50.0 years (12.5 ttl pk-yrs)     Types: Cigarettes    Smokeless tobacco: Never   Vaping Use    Vaping status: Never Used   Substance Use Topics    Alcohol use: No    Drug use: No       Review of Systems   Constitutional:  Negative for diaphoresis, fatigue and fever.   HENT:  Negative for hearing loss.    Respiratory:  Negative for cough, chest tightness and shortness of breath.    Cardiovascular:  Negative for chest pain, palpitations and leg swelling.   Gastrointestinal:  Negative for abdominal pain, diarrhea, nausea and vomiting.   Genitourinary:  Negative for difficulty urinating, dysuria, flank pain and hematuria.   Musculoskeletal:  Negative for arthralgias, back pain, gait problem, neck pain and neck stiffness.   Skin:  Negative for color change, pallor and rash.   Neurological:  Negative for dizziness, light-headedness and headaches.   Hematological:  Negative for adenopathy. Does not bruise/bleed easily.   Psychiatric/Behavioral:  Negative for agitation and behavioral problems.        Physical Exam  Physical Exam  Vitals and nursing note reviewed.   Constitutional:       General: He is not in acute distress.     Appearance: He is well-developed. He is not toxic-appearing or diaphoretic.    HENT:      Head: Normocephalic and atraumatic.      Right Ear: Ear canal normal.      Left Ear: Ear canal normal.      Nose: Nose normal. No congestion or rhinorrhea.      Mouth/Throat:      Mouth: Mucous membranes are moist.   Eyes:      Conjunctiva/sclera: Conjunctivae normal.   Cardiovascular:      Rate and Rhythm: Normal rate and regular rhythm.      Heart sounds: No murmur heard.  Pulmonary:      Effort: Pulmonary effort is normal. No respiratory distress.      Breath sounds: Normal breath sounds.   Abdominal:      Palpations: Abdomen is soft.      Tenderness: There is no abdominal tenderness.   Musculoskeletal:         General: No swelling or tenderness.      Cervical back: Normal range of motion and neck supple.   Skin:     General: Skin is warm and dry.      Capillary Refill: Capillary refill takes 2 to 3 seconds.      Comments: Examination of the left hand; has multiple superficial lacerations and the skin is very thin on the dorsum and on the ulnar side of the hand.  This does not need to be sutured because it is very superficial and also the skin is very thin and friable.   Neurological:      Mental Status: He is alert and oriented to person, place, and time.   Psychiatric:         Mood and Affect: Mood normal.               Vital Signs  ED Triage Vitals   Temperature Pulse Respirations Blood Pressure SpO2   02/19/24 0953 02/19/24 0954 02/19/24 0954 02/19/24 0954 02/19/24 0954   97.6 °F (36.4 °C) 104 18 (!) 174/81 97 %      Temp src Heart Rate Source Patient Position - Orthostatic VS BP Location FiO2 (%)   -- 02/19/24 0954 -- -- --    Monitor         Pain Score       --                  Vitals:    02/19/24 0954   BP: (!) 174/81   Pulse: 104         Visual Acuity      ED Medications  Medications   rabies immune globulin, human (HyperRAB) injection 1,500 Units (1,500 Units Infiltration Given 2/19/24 1018)   amoxicillin-clavulanate (AUGMENTIN) 875-125 mg per tablet 1 tablet (1 tablet Oral Given 2/19/24  1023)   rabies vaccine, human diploid IM injection 1 mL (1 mL Intramuscular Given 2/19/24 1019)   bacitracin topical ointment 1 small application (1 small application Topical Given 2/19/24 1049)       Diagnostic Studies  Results Reviewed       None                   No orders to display              Procedures  Procedures         ED Course                               SBIRT 22yo+      Flowsheet Row Most Recent Value   Initial Alcohol Screen: US AUDIT-C     1. How often do you have a drink containing alcohol? 0 Filed at: 02/19/2024 0955   2. How many drinks containing alcohol do you have on a typical day you are drinking?  0 Filed at: 02/19/2024 0955   3a. Male UNDER 65: How often do you have five or more drinks on one occasion? 0 Filed at: 02/19/2024 0955   3b. FEMALE Any Age, or MALE 65+: How often do you have 4 or more drinks on one occassion? 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 The lacerations are superfacial and multiple and pt has very thin skin. No need for suturing. Pt last tetanus shot 2021. Pt does not want to take rabies vaccine series, but agree to take rabies immunoglobulin.  Pt will observe dog and search his owner before take rabies series. Pt received Augmentin and a script for 5 days    Risk  OTC drugs.  Prescription drug management.             Disposition  Final diagnoses:   Dog bite of left hand, initial encounter     Time reflects when diagnosis was documented in both MDM as applicable and the Disposition within this note       Time User Action Codes Description Comment    2/19/2024 10:25 AM Maegan Xie Add [S61.452A,  W54.0XXA] Dog bite of left hand, initial encounter           ED Disposition       ED Disposition   Discharge    Condition   Stable    Date/Time   Mon Feb 19, 2024 1025    Comment   Stoney  FANY Lynch Jr. discharge to home/self care.                   Follow-up Information       Follow up With Specialties Details Why Contact Info    Dilip Hernandez MD Family Medicine In 3 days  [address]  [address]  [place] [address]  [phone]              Discharge Medication List as of 2/19/2024 10:38 AM        START taking these medications    Details   amoxicillin-clavulanate (AUGMENTIN) 875-125 mg per tablet Take 1 tablet by mouth every 12 (twelve) hours for 5 days, Starting Mon 2/19/2024, Until Sat 2/24/2024, Normal           CONTINUE these medications which have NOT CHANGED    Details   amLODIPine (NORVASC) 2.5 mg tablet TAKE ONE TABLET BY MOUTH EVERY DAY, Normal      aspirin (ECOTRIN LOW STRENGTH) 81 mg EC tablet Take 1 tablet (81 mg total) by mouth daily, Starting Mon 7/13/2020, No Print      atorvastatin (LIPITOR) 40 mg tablet TAKE ONE TABLET BY MOUTH EVERY DAY, Normal      loratadine (CLARITIN) 10 mg tablet Take 1 tablet (10 mg total) by mouth daily, Starting Tue 5/14/2019, Normal      Multiple Vitamins-Minerals (CENTRUM ULTRA MENS) TABS Take by mouth, Historical Med      Omega-3 Fatty Acids (Omega-3 Fish Oil) 500 MG CAPS Every 12 hours, Historical Med      triamcinolone (KENALOG) 0.1 % cream Apply topically 2 (two) times a day, Starting Wed 7/27/2022, Normal             No discharge procedures on file.    PDMP Review       None            ED Provider  Electronically Signed by             Maegan Xie MD  02/19/24 [id number]

## 2024-02-19 NOTE — DISCHARGE INSTRUCTIONS
Take medications as presribed  Follow up for dog vaccine status , and if not vaccinated, you need to start the vaccine shots for rabies.   Take tylenol for pain.

## 2024-03-12 DIAGNOSIS — I10 ESSENTIAL HYPERTENSION: ICD-10-CM

## 2024-03-14 RX ORDER — AMLODIPINE BESYLATE 2.5 MG/1
2.5 TABLET ORAL DAILY
Qty: 30 TABLET | Refills: 0 | Status: SHIPPED | OUTPATIENT
Start: 2024-03-14

## 2024-04-19 ENCOUNTER — RA CDI HCC (OUTPATIENT)
Dept: OTHER | Facility: HOSPITAL | Age: 74
End: 2024-04-19

## 2024-04-25 ENCOUNTER — OFFICE VISIT (OUTPATIENT)
Dept: FAMILY MEDICINE CLINIC | Facility: CLINIC | Age: 74
End: 2024-04-25
Payer: MEDICARE

## 2024-04-25 VITALS
HEIGHT: 69 IN | BODY MASS INDEX: 26.16 KG/M2 | SYSTOLIC BLOOD PRESSURE: 120 MMHG | HEART RATE: 80 BPM | DIASTOLIC BLOOD PRESSURE: 82 MMHG | WEIGHT: 176.6 LBS | TEMPERATURE: 98 F | OXYGEN SATURATION: 98 %

## 2024-04-25 DIAGNOSIS — I77.1 BILATERAL ILIAC ARTERY STENOSIS (HCC): ICD-10-CM

## 2024-04-25 DIAGNOSIS — Z79.4 TYPE 2 DIABETES MELLITUS WITHOUT COMPLICATION, WITH LONG-TERM CURRENT USE OF INSULIN (HCC): ICD-10-CM

## 2024-04-25 DIAGNOSIS — E11.9 TYPE 2 DIABETES MELLITUS WITHOUT COMPLICATION, WITH LONG-TERM CURRENT USE OF INSULIN (HCC): ICD-10-CM

## 2024-04-25 DIAGNOSIS — I10 ESSENTIAL HYPERTENSION: ICD-10-CM

## 2024-04-25 DIAGNOSIS — Z12.5 SCREENING FOR PROSTATE CANCER: ICD-10-CM

## 2024-04-25 DIAGNOSIS — Q25.1 ABDOMINAL AORTIC STENOSIS: ICD-10-CM

## 2024-04-25 DIAGNOSIS — I10 PRIMARY HYPERTENSION: ICD-10-CM

## 2024-04-25 DIAGNOSIS — L30.9 ECZEMA, UNSPECIFIED TYPE: ICD-10-CM

## 2024-04-25 DIAGNOSIS — Z00.00 MEDICARE ANNUAL WELLNESS VISIT, SUBSEQUENT: Primary | ICD-10-CM

## 2024-04-25 DIAGNOSIS — F17.210 CIGARETTE NICOTINE DEPENDENCE WITHOUT COMPLICATION: ICD-10-CM

## 2024-04-25 DIAGNOSIS — J44.9 MODERATE COPD (CHRONIC OBSTRUCTIVE PULMONARY DISEASE) (HCC): ICD-10-CM

## 2024-04-25 LAB
CREAT UR-MCNC: 87.8 MG/DL
MICROALBUMIN UR-MCNC: 59.9 MG/L
MICROALBUMIN/CREAT 24H UR: 68 MG/G CREATININE (ref 0–30)
SL AMB POCT HEMOGLOBIN AIC: 7.8 (ref ?–6.5)

## 2024-04-25 PROCEDURE — G0439 PPPS, SUBSEQ VISIT: HCPCS | Performed by: FAMILY MEDICINE

## 2024-04-25 PROCEDURE — 82570 ASSAY OF URINE CREATININE: CPT | Performed by: FAMILY MEDICINE

## 2024-04-25 PROCEDURE — 82043 UR ALBUMIN QUANTITATIVE: CPT | Performed by: FAMILY MEDICINE

## 2024-04-25 PROCEDURE — G0444 DEPRESSION SCREEN ANNUAL: HCPCS | Performed by: FAMILY MEDICINE

## 2024-04-25 PROCEDURE — G0442 ANNUAL ALCOHOL SCREEN 15 MIN: HCPCS | Performed by: FAMILY MEDICINE

## 2024-04-25 PROCEDURE — 99214 OFFICE O/P EST MOD 30 MIN: CPT | Performed by: FAMILY MEDICINE

## 2024-04-25 PROCEDURE — 83036 HEMOGLOBIN GLYCOSYLATED A1C: CPT | Performed by: FAMILY MEDICINE

## 2024-04-25 RX ORDER — TRIAMCINOLONE ACETONIDE 1 MG/G
CREAM TOPICAL 2 TIMES DAILY
Qty: 60 G | Refills: 3 | Status: SHIPPED | OUTPATIENT
Start: 2024-04-25

## 2024-04-25 RX ORDER — AMLODIPINE BESYLATE 2.5 MG/1
2.5 TABLET ORAL DAILY
Qty: 90 TABLET | Refills: 0 | Status: SHIPPED | OUTPATIENT
Start: 2024-04-25

## 2024-04-25 NOTE — PROGRESS NOTES
Assessment and Plan:     Problem List Items Addressed This Visit        Cardiovascular and Mediastinum    Abdominal aortic stenosis     Asymptomatic. Check vasc studies and labs. Continue to encourage exercise.  Recheck 3m         Relevant Orders    VAS ARTERIAL DUPLEX- LOWER LIMB BILATERAL    VAS abdominal aorta/iliac duplex    Bilateral iliac artery stenosis (HCC)     Without significant claudication.  Check labs and vasc study.  Recheck 3m         Relevant Orders    VAS ARTERIAL DUPLEX- LOWER LIMB BILATERAL    VAS abdominal aorta/iliac duplex    Essential hypertension     Well controlled. Cont present treatment. Monitor labs. Recheck 6m           Relevant Medications    amLODIPine (NORVASC) 2.5 mg tablet       Respiratory    Moderate COPD (chronic obstructive pulmonary disease) (HCC)     Still smoking. No significant SOB/wheeze. Urged smoking cessation.  Check CT of lungs. Recheck 3m            Endocrine    Type 2 diabetes mellitus without complication, with long-term current use of insulin ([facility])       Lab Results   Component Value Date    HGBA1C 7.8 (A) 04/25/2024   Poor control.  Urged diet control. Recheck 3m - may need to start meds if not at goal.          Relevant Orders    POCT hemoglobin A1c (Completed)    Albumin / creatinine urine ratio       Musculoskeletal and Integument    Eczema     Lower leg. Moisturize. Refilled TAC 0.1%. Recheck 1m if not improving - earlier if worse         Relevant Medications    triamcinolone (KENALOG) 0.1 % cream   Other Visit Diagnoses     Medicare annual wellness visit, subsequent    -  Primary    Cigarette nicotine dependence without complication        Relevant Orders    CT lung screening program    Screening for prostate cancer        Relevant Orders    PSA, Total Screen           Preventive health issues were discussed with patient, and age appropriate screening tests were ordered as noted in patient's After Visit Summary.  Personalized health advice and appropriate  referrals for health education or preventive services given if needed, as noted in patient's After Visit Summary.     History of Present Illness:     Patient presents for a Medicare Wellness Visit    f/u multiple med issues and AWV  - pt is feeling well.   - pt very active but does not have a strict exercise program. Pt denies CP, palpitations, lightheadedness or other CV symptoms with or without exertion.    - pt not always compliant with diet. Weight is down a little but pt is getting more active. A1C in the office today = 7.8.  Due for eye exam  - pt is still smoking approx 3/4-1 ppd.  No worsening SOB  - no increased claudication symptoms. Due to see vasc surgery.   - has opersistent lower leg pruritic dermatitis  - pt denies any new GI or Gu issues  - AWV done         Patient Care Team:  Dilip Hernandez MD as PCP - DO Rosendo Abrams MD Christopher Pogodzinski, MD Sinan Kutty, MD (Gastroenterology)  Joss Castillo MD as Endoscopist     Review of Systems:     Review of Systems   Constitutional: Negative.    HENT: Negative.     Eyes: Negative.    Respiratory: Negative.     Cardiovascular: Negative.    Gastrointestinal: Negative.    Genitourinary: Negative.    Musculoskeletal:  Positive for arthralgias (mild scattered). Negative for back pain, gait problem and myalgias.   Skin:  Positive for rash (bilat lower legs).   Neurological: Negative.    Psychiatric/Behavioral: Negative.          Problem List:     Patient Active Problem List   Diagnosis   • Atherosclerosis of native arteries of extremities with intermittent claudication, bilateral legs (HCC)   • Fatigue   • Essential hypertension   • Bilateral iliac artery stenosis (HCC)   • Abdominal aortic stenosis   • Moderate COPD (chronic obstructive pulmonary disease) (HCC)   • Benign paroxysmal positional vertigo   • Allergic rhinitis   • Dyslipidemia   • Stenosis of left renal artery (HCC)   • Chronic pain of right knee   • Skin  cancer, basal cell   • Aortic sclerosis   • Type 2 diabetes mellitus without complication, with long-term current use of insulin (HCC)   • Pulmonary function studies abnormal   • Tobacco use disorder   • Lung field abnormal   • Eczema   • Perianal dermatitis   • Adenomatous polyp of colon   • Hematuria      Past Medical and Surgical History:     Past Medical History:   Diagnosis Date   • Colon polyp    • COPD (chronic obstructive pulmonary disease) (HCC)    • Coronary artery disease    • GERD (gastroesophageal reflux disease)    • Hyperlipidemia    • Hypertension    • PVD (peripheral vascular disease) (HCC)    • Seasonal allergies      Past Surgical History:   Procedure Laterality Date   • APPENDECTOMY  2016   • CARDIAC SURGERY  2012    stents x3   • COLONOSCOPY  2008    [facility]wang   • KNEE ARTHROSCOPY Bilateral     post work injury   • [place] COLONOSCOPY FLX DX W/COLLJ SPEC WHEN PFRMD N/A 12/20/2018    Procedure: COLONOSCOPY;  Surgeon: Joss Castillo MD;  Location: [facility] GI LAB;  Service: Gastroenterology   • SHOULDER SURGERY Right     cheese tumor removed   • TONSILLECTOMY      age 22      Family History:     Family History   Problem Relation Age of Onset   • Heart disease Mother    • Cancer Mother         breast   • No Known Problems Father       Social History:     Social History     Socioeconomic History   • Marital status: /Civil Union     Spouse name: None   • Number of children: None   • Years of education: None   • Highest education level: None   Occupational History   • None   Tobacco Use   • Smoking status: Some Days     Current packs/day: 0.25     Average packs/day: 0.3 packs/day for 50.0 years (12.5 ttl pk-yrs)     Types: Cigarettes   • Smokeless tobacco: Never   Vaping Use   • Vaping status: Never Used   Substance and Sexual Activity   • Alcohol use: No   • Drug use: No   • Sexual activity: None   Other Topics Concern   • None   Social History Narrative   • None     Social Determinants of Health      Financial Resource Strain: Not on file   Food Insecurity: No Food Insecurity (4/25/2024)    Hunger Vital Sign    • Worried About Running Out of Food in the Last Year: Never true    • Ran Out of Food in the Last Year: Never true   Transportation Needs: No Transportation Needs (4/25/2024)    PRAPARE - Transportation    • Lack of Transportation (Medical): No    • Lack of Transportation (Non-Medical): No   Physical Activity: Not on file   Stress: Not on file   Social Connections: Not on file   Intimate Partner Violence: Not on file   Housing Stability: Low Risk  (4/25/2024)    Housing Stability Vital Sign    • Unable to Pay for Housing in the Last Year: No    • Number of Places Lived in the Last Year: 1    • Unstable Housing in the Last Year: No      Medications and Allergies:     Current Outpatient Medications   Medication Sig Dispense Refill   • amLODIPine (NORVASC) 2.5 mg tablet Take 1 tablet (2.5 mg total) by mouth daily 90 tablet 0   • aspirin (ECOTRIN LOW STRENGTH) 81 mg EC tablet Take 1 tablet (81 mg total) by mouth daily     • atorvastatin (LIPITOR) 40 mg tablet TAKE ONE TABLET BY MOUTH EVERY DAY 90 tablet 3   • loratadine (CLARITIN) 10 mg tablet Take 1 tablet (10 mg total) by mouth daily 30 tablet 1   • Multiple Vitamins-Minerals (CENTRUM ULTRA MENS) TABS Take by mouth     • Omega-3 Fatty Acids (Omega-3 Fish Oil) 500 MG CAPS Every 12 hours     • triamcinolone (KENALOG) 0.1 % cream Apply topically 2 (two) times a day 60 g 3     No current facility-administered medications for this visit.     Allergies   Allergen Reactions   • Naproxen Swelling and Rash      Immunizations:     Immunization History   Administered Date(s) Administered   • COVID-19 PFIZER VACCINE 0.3 ML IM 03/27/2021, 04/17/2021, 11/27/2021   • Influenza, high dose seasonal 0.7 mL 11/15/2022, 10/27/2023   • Rabies Immune Globulin 02/19/2024   • Rabies-IM Human Diploid Cell Culture 02/19/2024   • Tdap 12/31/2021      Health Maintenance:          Topic Date Due   • Hepatitis C Screening  Never done   • Colorectal Cancer Screening  12/20/2021         Topic Date Due   • Pneumococcal Vaccine: 65+ Years (1 of 2 - PCV) Never done   • COVID-19 Vaccine (4 - 2023-24 season) 09/01/2023      Medicare Screening Tests and Risk Assessments:     Stoney is here for his Subsequent Wellness visit. Last Medicare Wellness visit information reviewed, patient interviewed and updates made to the record today.      Health Risk Assessment:   Patient rates overall health as very good. Patient feels that their physical health rating is same. Patient is very satisfied with their life. Eyesight was rated as same. Hearing was rated as same. Patient feels that their emotional and mental health rating is same. Patients states they are never, rarely angry. Patient states they are never, rarely unusually tired/fatigued. Pain experienced in the last 7 days has been none. Patient states that he has experienced no weight loss or gain in last 6 months.     Depression Screening:   PHQ-2 Score: 0      Fall Risk Screening:   In the past year, patient has experienced: no history of falling in past year      Home Safety:  Patient does not have trouble with stairs inside or outside of their home. Patient has working smoke alarms and has working carbon monoxide detector. Home safety hazards include: none.     Nutrition:   Current diet is Regular.     Medications:   Patient is currently taking over-the-counter supplements. OTC medications include: see medication list. Patient is able to manage medications.     Activities of Daily Living (ADLs)/Instrumental Activities of Daily Living (IADLs):   Walk and transfer into and out of bed and chair?: Yes  Dress and groom yourself?: Yes    Bathe or shower yourself?: Yes    Feed yourself? Yes  Do your laundry/housekeeping?: Yes  Manage your money, pay your bills and track your expenses?: Yes  Make your own meals?: Yes    Do your own shopping?: Yes    Previous  Hospitalizations:   Any hospitalizations or ED visits within the last 12 months?: Yes    How many hospitalizations have you had in the last year?: 1-2    Advance Care Planning:   Living will: Yes    Durable POA for healthcare: No    Advanced directive: No    Advanced directive counseling given: Yes      Cognitive Screening:   Provider or family/friend/caregiver concerned regarding cognition?: No    PREVENTIVE SCREENINGS      Cardiovascular Screening:    General: Screening Current      Diabetes Screening:     General: Screening Not Indicated and History Diabetes      Colorectal Cancer Screening:     General: Screening Current      Prostate Cancer Screening:    General: Risks and Benefits Discussed    Due for: PSA      Osteoporosis Screening:    General: Screening Not Indicated      Abdominal Aortic Aneurysm (AAA) Screening:    Risk factors include: age between 65-74 yo and tobacco use        Lung Cancer Screening:     General: Screening Not Indicated      Hepatitis C Screening:    General: Risks and Benefits Discussed    Hep C Screening Accepted: No     Screening, Brief Intervention, and Referral to Treatment (SBIRT)    Screening      AUDIT-C Screenin) How often did you have a drink containing alcohol in the past year? never  2) How many drinks did you have on a typical day when you were drinking in the past year? 0  3) How often did you have 6 or more drinks on one occasion in the past year? never    AUDIT-C Score: 0  Interpretation: Score 0-3 (male): Negative screen for alcohol misuse    Single Item Drug Screening:  How often have you used an illegal drug (including marijuana) or a prescription medication for non-medical reasons in the past year? never    Single Item Drug Screen Score: 0  Interpretation: Negative screen for possible drug use disorder    Time Spent  Time spent screening/evaluating the patient for alcohol misuse: 5 minutes.     Annual Depression Screening  Time spent screening and evaluating the  "patient for depression during today's encounter was 5 minutes.    Other Counseling Topics:   Calcium and vitamin D intake and regular weightbearing exercise.     No results found.     Physical Exam:     /82 (BP Location: Left arm, Patient Position: Sitting, Cuff Size: Adult)   Pulse 80   Temp 98 °F (36.7 °C)   Ht 5' 9\" (1.753 m)   Wt 80.1 kg (176 lb 9.6 oz)   SpO2 98%   BMI 26.08 kg/m²     Physical Exam  Vitals reviewed.   Constitutional:       Appearance: He is well-developed.   HENT:      Head: Normocephalic and atraumatic.      Right Ear: Tympanic membrane, ear canal and external ear normal.      Left Ear: Tympanic membrane, ear canal and external ear normal.      Nose: Nose normal.      Mouth/Throat:      Mouth: Mucous membranes are moist.   Eyes:      Extraocular Movements: Extraocular movements intact.      Conjunctiva/sclera: Conjunctivae normal.      Pupils: Pupils are equal, round, and reactive to light.   Neck:      Thyroid: No thyromegaly.      Vascular: No JVD.   Cardiovascular:      Rate and Rhythm: Normal rate and regular rhythm.      Pulses: no weak pulses.           Posterior tibial pulses are 1+ on the right side and 1+ on the left side.      Heart sounds: Normal heart sounds. No murmur heard.  Pulmonary:      Effort: Pulmonary effort is normal. No respiratory distress.      Breath sounds: Normal breath sounds. No wheezing or rales.   Abdominal:      General: Bowel sounds are normal. There is no distension.      Palpations: Abdomen is soft. There is no mass.      Tenderness: There is no abdominal tenderness.   Musculoskeletal:         General: No swelling, tenderness or deformity. Normal range of motion.      Cervical back: Normal range of motion and neck supple. No tenderness. No muscular tenderness.      Right lower leg: No edema.      Left lower leg: No edema.   Feet:      Right foot:      Skin integrity: No ulcer, skin breakdown, erythema, warmth, callus or dry skin.      Left foot: "      Skin integrity: No ulcer, skin breakdown, erythema, warmth, callus or dry skin.   Lymphadenopathy:      Cervical: No cervical adenopathy.   Skin:     General: Skin is warm.      Capillary Refill: Capillary refill takes less than 2 seconds.      Findings: Rash (mild eczematous over shins bilat) present.   Neurological:      Mental Status: He is alert and oriented to person, place, and time.      Cranial Nerves: No cranial nerve deficit.      Sensory: No sensory deficit.      Motor: No weakness or abnormal muscle tone.      Gait: Gait normal.   Psychiatric:         Mood and Affect: Mood normal.         Behavior: Behavior normal.         Thought Content: Thought content normal.         Judgment: Judgment normal.      Comments: PHQ-2/9 Depression Screening    Little interest or pleasure in doing things: 0 - not at all  Feeling down, depressed, or hopeless: 0 - not at all  PHQ-2 Score: 0  PHQ-2 Interpretation: Negative depression screen          Patient's shoes and socks removed.    Right Foot/Ankle   Right Foot Inspection  Skin Exam: skin normal and skin intact. No dry skin, no warmth, no callus, no erythema, no maceration, no abnormal color, no pre-ulcer, no ulcer and no callus.     Toe Exam: ROM and strength within normal limits.     Sensory   Vibration: intact  Monofilament testing: intact    Vascular  Capillary refills: < 3 seconds  The right PT pulse is 1+.     Left Foot/Ankle  Left Foot Inspection  Skin Exam: skin normal and skin intact. No dry skin, no warmth, no erythema, no maceration, normal color, no pre-ulcer, no ulcer and no callus.     Toe Exam: ROM and strength within normal limits.     Sensory   Vibration: intact  Monofilament testing: intact    Vascular  Capillary refills: < 3 seconds  The left PT pulse is 1+.     Assign Risk Category  No deformity present  No loss of protective sensation  No weak pulses  Risk: 0       Dilip Hernandez MD

## 2024-04-25 NOTE — ASSESSMENT & PLAN NOTE
Lab Results   Component Value Date    HGBA1C 7.8 (A) 04/25/2024   Poor control.  Urged diet control. Recheck 3m - may need to start meds if not at goal.

## 2024-07-31 ENCOUNTER — OFFICE VISIT (OUTPATIENT)
Dept: FAMILY MEDICINE CLINIC | Facility: CLINIC | Age: 74
End: 2024-07-31
Payer: MEDICARE

## 2024-07-31 VITALS
HEIGHT: 69 IN | OXYGEN SATURATION: 98 % | DIASTOLIC BLOOD PRESSURE: 80 MMHG | TEMPERATURE: 97.2 F | SYSTOLIC BLOOD PRESSURE: 140 MMHG | BODY MASS INDEX: 25.07 KG/M2 | HEART RATE: 90 BPM | WEIGHT: 169.3 LBS

## 2024-07-31 DIAGNOSIS — R05.8 PRODUCTIVE COUGH: ICD-10-CM

## 2024-07-31 DIAGNOSIS — E11.9 TYPE 2 DIABETES MELLITUS WITHOUT COMPLICATION, WITH LONG-TERM CURRENT USE OF INSULIN (HCC): Primary | ICD-10-CM

## 2024-07-31 DIAGNOSIS — I10 ESSENTIAL HYPERTENSION: ICD-10-CM

## 2024-07-31 DIAGNOSIS — Z79.4 TYPE 2 DIABETES MELLITUS WITHOUT COMPLICATION, WITH LONG-TERM CURRENT USE OF INSULIN (HCC): Primary | ICD-10-CM

## 2024-07-31 DIAGNOSIS — I70.213 ATHEROSCLEROSIS OF NATIVE ARTERIES OF EXTREMITIES WITH INTERMITTENT CLAUDICATION, BILATERAL LEGS (HCC): ICD-10-CM

## 2024-07-31 LAB — SL AMB POCT HEMOGLOBIN AIC: 7.2 (ref ?–6.5)

## 2024-07-31 PROCEDURE — 83036 HEMOGLOBIN GLYCOSYLATED A1C: CPT | Performed by: FAMILY MEDICINE

## 2024-07-31 PROCEDURE — 99214 OFFICE O/P EST MOD 30 MIN: CPT | Performed by: FAMILY MEDICINE

## 2024-07-31 RX ORDER — AZITHROMYCIN 250 MG/1
TABLET, FILM COATED ORAL
Qty: 6 TABLET | Refills: 0 | Status: SHIPPED | OUTPATIENT
Start: 2024-07-31 | End: 2024-08-04

## 2024-07-31 RX ORDER — AMLODIPINE BESYLATE 2.5 MG/1
2.5 TABLET ORAL DAILY
Qty: 90 TABLET | Refills: 1 | Status: SHIPPED | OUTPATIENT
Start: 2024-07-31

## 2024-07-31 NOTE — PROGRESS NOTES
Ambulatory Visit  Name: Stoney Lynch Jr.      : 1950      MRN: [id number]  Encounter Provider: Dilip Hernandez MD  Encounter Date: 2024   Encounter department: [facility]    Assessment & Plan   1. Type 2 diabetes mellitus without complication, with long-term current use of insulin (HCC)  Assessment & Plan:    Lab Results   Component Value Date    HGBA1C 7.2 (A) 2024   Sl improved. Continue weight loss efforts. Recheck 6m  Orders:  -     POCT hemoglobin A1c  2. Productive cough  Assessment & Plan:  I reviewed with pt. No change with conservative measures. Start zithromax. Recheck 1w if not improving  Orders:  -     azithromycin (ZITHROMAX) 250 mg tablet; 2 tab today then 1 tab po qd x 4 d  3. Essential hypertension  Assessment & Plan:  Well controlled. Cont present treatment. Monitor labs. Recheck 6m    Orders:  -     amLODIPine (NORVASC) 2.5 mg tablet; Take 1 tablet (2.5 mg total) by mouth daily  4. Atherosclerosis of native arteries of extremities with intermittent claudication, bilateral legs (HCC)  Assessment & Plan:  Pt without severe claudication symptoms.  Continue present care. Monitor diet. Recheck 6m         History of Present Illness     f/u multiple med issues   - pt with severe productive cough x 2w. No fever/chills. Does not feel SOB. Wife is presently admitted for pneumonia. Pt still smoking. He is trying to cut down - presently averaging 1/4-1/2 ppd  - pt denies CP, palpitations, lightheadedness or other CV symptoms with or without exertion.    - pt is working on his diet and has been trying to lose weight.  A1C in the office today has  from 7.8 to 7.2.    - no increased claudication symptoms. Did not schedule f/u with vasc surgery yet.   - pt denies any new GI or Gu issues        Review of Systems   Constitutional: Negative.    HENT: Negative.     Eyes: Negative.    Respiratory:  Positive for cough. Negative for shortness of breath  and wheezing.    Cardiovascular: Negative.    Gastrointestinal: Negative.    Genitourinary: Negative.    Musculoskeletal:  Positive for arthralgias. Negative for gait problem, joint swelling and myalgias.   Neurological: Negative.    Psychiatric/Behavioral: Negative.       Past Medical History:   Diagnosis Date   • Colon polyp    • COPD (chronic obstructive pulmonary disease) (HCC)    • Coronary artery disease    • GERD (gastroesophageal reflux disease)    • Hyperlipidemia    • Hypertension    • PVD (peripheral vascular disease) (HCC)    • Seasonal allergies      Past Surgical History:   Procedure Laterality Date   • APPENDECTOMY  2016   • CARDIAC SURGERY  2012    stents x3   • COLONOSCOPY  2008    [facility]   • KNEE ARTHROSCOPY Bilateral     post work injury   • [place] COLONOSCOPY FLX DX W/COLLJ SPEC WHEN PFRMD N/A 12/20/2018    Procedure: COLONOSCOPY;  Surgeon: Joss Castillo MD;  Location: [facility] GI LAB;  Service: Gastroenterology   • SHOULDER SURGERY Right     cheese tumor removed   • TONSILLECTOMY      age 22     Family History   Problem Relation Age of Onset   • Heart disease Mother    • Cancer Mother         breast   • No Known Problems Father      Social History     Tobacco Use   • Smoking status: Some Days     Current packs/day: 0.25     Average packs/day: 0.3 packs/day for 50.0 years (12.5 ttl pk-yrs)     Types: Cigarettes   • Smokeless tobacco: Never   Vaping Use   • Vaping status: Never Used   Substance and Sexual Activity   • Alcohol use: No   • Drug use: No   • Sexual activity: Not on file     Current Outpatient Medications on File Prior to Visit   Medication Sig   • aspirin (ECOTRIN LOW STRENGTH) 81 mg EC tablet Take 1 tablet (81 mg total) by mouth daily   • atorvastatin (LIPITOR) 40 mg tablet TAKE ONE TABLET BY MOUTH EVERY DAY   • loratadine (CLARITIN) 10 mg tablet Take 1 tablet (10 mg total) by mouth daily   • Multiple Vitamins-Minerals (CENTRUM ULTRA MENS) TABS Take by mouth   • Omega-3 Fatty Acids  "(Omega-3 Fish Oil) 500 MG CAPS Every 12 hours   • triamcinolone (KENALOG) 0.1 % cream Apply topically 2 (two) times a day     Allergies   Allergen Reactions   • Naproxen Swelling and Rash     Immunization History   Administered Date(s) Administered   • COVID-19 PFIZER VACCINE 0.3 ML IM 03/27/2021, 04/17/2021, 11/27/2021   • Influenza, high dose seasonal 0.7 mL 11/15/2022, 10/27/2023   • Rabies Immune Globulin 02/19/2024   • Rabies-IM Human Diploid Cell Culture 02/19/2024   • Tdap 12/31/2021     Objective     /80 (BP Location: Left arm, Patient Position: Sitting, Cuff Size: Adult)   Pulse 90   Temp (!) 97.2 °F (36.2 °C)   Ht 5' 9\" (1.753 m)   Wt 76.8 kg (169 lb 4.8 oz)   SpO2 98%   BMI 25.00 kg/m²     Physical Exam  Vitals reviewed.   HENT:      Head: Normocephalic.      Nose: Nose normal.      Mouth/Throat:      Mouth: Mucous membranes are moist.   Eyes:      Extraocular Movements: Extraocular movements intact.      Conjunctiva/sclera: Conjunctivae normal.      Pupils: Pupils are equal, round, and reactive to light.   Cardiovascular:      Rate and Rhythm: Normal rate and regular rhythm.      Pulses: Normal pulses.   Pulmonary:      Effort: Pulmonary effort is normal.      Breath sounds: Rhonchi present. No wheezing or rales.   Abdominal:      General: There is no distension.      Palpations: There is no mass.      Tenderness: There is no abdominal tenderness.   Musculoskeletal:         General: Deformity (mild diffuse OA changes) present. No tenderness.      Cervical back: No tenderness.      Right lower leg: No edema.      Left lower leg: No edema.   Lymphadenopathy:      Cervical: No cervical adenopathy.   Skin:     General: Skin is warm.      Capillary Refill: Capillary refill takes less than 2 seconds.   Neurological:      General: No focal deficit present.      Mental Status: He is alert and oriented to person, place, and time.   Psychiatric:         Mood and Affect: Mood normal.         "

## 2024-08-02 PROBLEM — R05.8 PRODUCTIVE COUGH: Status: ACTIVE | Noted: 2024-08-02

## 2024-08-02 NOTE — ASSESSMENT & PLAN NOTE
Lab Results   Component Value Date    HGBA1C 7.2 (A) 07/31/2024   Sl improved. Continue weight loss efforts. Recheck 6m

## 2024-08-02 NOTE — ASSESSMENT & PLAN NOTE
I reviewed with pt. No change with conservative measures. Start zithromax. Recheck 1w if not improving

## 2024-09-01 PROBLEM — R05.8 PRODUCTIVE COUGH: Status: RESOLVED | Noted: 2024-08-02 | Resolved: 2024-09-01

## 2024-10-09 ENCOUNTER — IMMUNIZATIONS (OUTPATIENT)
Dept: FAMILY MEDICINE CLINIC | Facility: CLINIC | Age: 74
End: 2024-10-09
Payer: MEDICARE

## 2024-10-09 DIAGNOSIS — Z23 ENCOUNTER FOR IMMUNIZATION: Primary | ICD-10-CM

## 2024-10-09 PROCEDURE — 90662 IIV NO PRSV INCREASED AG IM: CPT

## 2024-10-09 PROCEDURE — G0008 ADMIN INFLUENZA VIRUS VAC: HCPCS

## 2024-11-22 ENCOUNTER — RA CDI HCC (OUTPATIENT)
Dept: OTHER | Facility: HOSPITAL | Age: 74
End: 2024-11-22

## 2024-11-29 ENCOUNTER — OFFICE VISIT (OUTPATIENT)
Dept: FAMILY MEDICINE CLINIC | Facility: CLINIC | Age: 74
End: 2024-11-29
Payer: MEDICARE

## 2024-11-29 VITALS
HEIGHT: 69 IN | WEIGHT: 170 LBS | SYSTOLIC BLOOD PRESSURE: 122 MMHG | OXYGEN SATURATION: 95 % | BODY MASS INDEX: 25.18 KG/M2 | HEART RATE: 113 BPM | DIASTOLIC BLOOD PRESSURE: 76 MMHG | TEMPERATURE: 97.6 F

## 2024-11-29 DIAGNOSIS — Z79.4 TYPE 2 DIABETES MELLITUS WITHOUT COMPLICATION, WITH LONG-TERM CURRENT USE OF INSULIN (HCC): Primary | ICD-10-CM

## 2024-11-29 DIAGNOSIS — I10 ESSENTIAL HYPERTENSION: ICD-10-CM

## 2024-11-29 DIAGNOSIS — E11.9 TYPE 2 DIABETES MELLITUS WITHOUT COMPLICATION, WITH LONG-TERM CURRENT USE OF INSULIN (HCC): Primary | ICD-10-CM

## 2024-11-29 DIAGNOSIS — E78.5 DYSLIPIDEMIA: ICD-10-CM

## 2024-11-29 DIAGNOSIS — J44.9 MODERATE COPD (CHRONIC OBSTRUCTIVE PULMONARY DISEASE) (HCC): ICD-10-CM

## 2024-11-29 LAB
LEFT EYE DIABETIC RETINOPATHY: NORMAL
LEFT EYE IMAGE QUALITY: NORMAL
LEFT EYE MACULAR EDEMA: NORMAL
LEFT EYE OTHER RETINOPATHY: NORMAL
RIGHT EYE DIABETIC RETINOPATHY: NORMAL
RIGHT EYE IMAGE QUALITY: NORMAL
RIGHT EYE MACULAR EDEMA: NORMAL
RIGHT EYE OTHER RETINOPATHY: NORMAL
SEVERITY (EYE EXAM): NORMAL
SL AMB POCT HEMOGLOBIN AIC: 7.8 (ref ?–6.5)

## 2024-11-29 PROCEDURE — 83036 HEMOGLOBIN GLYCOSYLATED A1C: CPT | Performed by: FAMILY MEDICINE

## 2024-11-29 PROCEDURE — 99214 OFFICE O/P EST MOD 30 MIN: CPT | Performed by: FAMILY MEDICINE

## 2024-11-29 NOTE — ASSESSMENT & PLAN NOTE
Lab Results   Component Value Date    HGBA1C 7.8 (A) 11/29/2024   Glucose running higher due to dietary noncompliance.  This is primarily when wife was in the hospital.  Patient states that he is doing better now that she is home.  Will monitor for now.  Recheck labs in 3 months.  If A1c remains elevated we will need to start medications.  Patient aware.  Recheck 3 months    Orders:    POCT hemoglobin A1c    CBC and differential; Future    Comprehensive metabolic panel; Future    Lipid panel; Future    [facility] Diabetic eye exam

## 2024-11-29 NOTE — PROGRESS NOTES
Name: Stoney Lynch Jr.      : 1950      MRN: [id number]  Encounter Provider: Dilip Hernandez MD  Encounter Date: 2024   Encounter department: [facility]    Assessment & Plan  Type 2 diabetes mellitus without complication, with long-term current use of insulin (HCC)    Lab Results   Component Value Date    HGBA1C 7.8 (A) 2024   Glucose running higher due to dietary noncompliance.  This is primarily when wife was in the hospital.  Patient states that he is doing better now that she is home.  Will monitor for now.  Recheck labs in 3 months.  If A1c remains elevated we will need to start medications.  Patient aware.  Recheck 3 months    Orders:    POCT hemoglobin A1c    CBC and differential; Future    Comprehensive metabolic panel; Future    Lipid panel; Future    IRIS Diabetic eye exam    Essential hypertension  Blood pressure controlled.  Continue present treatment.  Recheck 3 to 6 months  Orders:    CBC and differential; Future    Comprehensive metabolic panel; Future    Lipid panel; Future    Moderate COPD (chronic obstructive pulmonary disease) ([facility])  Patient still smoking 1/2 pack a day.  Denies shortness of breath, productive cough or other symptoms.  CT of the lungs was negative in -patient has not gone for repeat yet.  Patient to have this done.  Recheck 3 to 6 months       Dyslipidemia  Patient due for labs.  Continue present treatment.  Recheck 3 to 6 months            History of Present Illness     f/u multiple med issues   - pt fell off of his diet while his wife was in the hospital x 2w.  Now is doing better. A1C in office = 7.8  - pt still smoking 1/2 ppd.  He denies issues with breathing/SOB  - pt active at work (works in [employer]) .. Pt denies CP, palpitations, lightheadedness or other CV symptoms with or without exertion.    - pt denies any new GI or Gu issues  - pt with occasional intention tremor. Does not keep him from being  active        Review of Systems   Constitutional: Negative.    HENT: Negative.     Eyes: Negative.    Respiratory: Negative.     Cardiovascular: Negative.    Gastrointestinal: Negative.    Endocrine: Negative.    Genitourinary: Negative.    Musculoskeletal: Negative.    Skin: Negative.    Allergic/Immunologic: Negative.    Neurological: Negative.    Hematological: Negative.    Psychiatric/Behavioral: Negative.       Past Medical History:   Diagnosis Date    Colon polyp     COPD (chronic obstructive pulmonary disease) (HCC)     Coronary artery disease     GERD (gastroesophageal reflux disease)     Hyperlipidemia     Hypertension     PVD (peripheral vascular disease) (HCC)     Seasonal allergies      Past Surgical History:   Procedure Laterality Date    APPENDECTOMY  2016    CARDIAC SURGERY  2012    stents x3    COLONOSCOPY  2008    boonswang    KNEE ARTHROSCOPY Bilateral     post work injury    [place] COLONOSCOPY FLX DX W/COLLJ SPEC WHEN PFRMD N/A 12/20/2018    Procedure: COLONOSCOPY;  Surgeon: Joss Castillo MD;  Location: [facility] GI LAB;  Service: Gastroenterology    SHOULDER SURGERY Right     cheese tumor removed    TONSILLECTOMY      age 22     Family History   Problem Relation Age of Onset    Heart disease Mother     Cancer Mother         breast    No Known Problems Father      Social History     Tobacco Use    Smoking status: Some Days     Current packs/day: 0.25     Average packs/day: 0.3 packs/day for 50.0 years (12.5 ttl pk-yrs)     Types: Cigarettes    Smokeless tobacco: Never   Vaping Use    Vaping status: Never Used   Substance and Sexual Activity    Alcohol use: No    Drug use: No    Sexual activity: Not on file     Current Outpatient Medications on File Prior to Visit   Medication Sig    amLODIPine (NORVASC) 2.5 mg tablet Take 1 tablet (2.5 mg total) by mouth daily    aspirin (ECOTRIN LOW STRENGTH) 81 mg EC tablet Take 1 tablet (81 mg total) by mouth daily    atorvastatin (LIPITOR) 40 mg tablet TAKE ONE TABLET  "BY MOUTH EVERY DAY    loratadine (CLARITIN) 10 mg tablet Take 1 tablet (10 mg total) by mouth daily    Multiple Vitamins-Minerals (CENTRUM ULTRA MENS) TABS Take by mouth    Omega-3 Fatty Acids (Omega-3 Fish Oil) 500 MG CAPS Every 12 hours    triamcinolone (KENALOG) 0.1 % cream Apply topically 2 (two) times a day     Allergies   Allergen Reactions    Naproxen Swelling and Rash     Immunization History   Administered Date(s) Administered    COVID-19 PFIZER VACCINE 0.3 ML IM 03/27/2021, 04/17/2021, 11/27/2021    Influenza Split High Dose Preservative Free IM 10/09/2024    Influenza, high dose seasonal 0.7 mL 11/15/2022, 10/27/2023    Rabies Immune Globulin 02/19/2024    Rabies-IM Human Diploid Cell Culture 02/19/2024    Tdap 12/31/2021     Objective   /76   Pulse (!) 113   Temp 97.6 °F (36.4 °C)   Ht 5' 9\" (1.753 m)   Wt 77.1 kg (170 lb)   SpO2 95%   BMI 25.10 kg/m²     Physical Exam  Vitals reviewed.   HENT:      Mouth/Throat:      Mouth: Mucous membranes are moist.   Eyes:      Extraocular Movements: Extraocular movements intact.      Conjunctiva/sclera: Conjunctivae normal.      Pupils: Pupils are equal, round, and reactive to light.   Neck:      Vascular: No carotid bruit.   Cardiovascular:      Rate and Rhythm: Normal rate and regular rhythm.   Pulmonary:      Effort: Pulmonary effort is normal.   Abdominal:      General: There is no distension.      Tenderness: There is no abdominal tenderness.   Musculoskeletal:         General: No swelling or tenderness. Normal range of motion.      Cervical back: No tenderness.      Right lower leg: No edema.      Left lower leg: No edema.   Lymphadenopathy:      Cervical: No cervical adenopathy.   Skin:     General: Skin is warm.      Capillary Refill: Capillary refill takes less than 2 seconds.   Neurological:      General: No focal deficit present.      Mental Status: He is alert and oriented to person, place, and time.         "

## 2024-11-29 NOTE — ASSESSMENT & PLAN NOTE
Blood pressure controlled.  Continue present treatment.  Recheck 3 to 6 months  Orders:    CBC and differential; Future    Comprehensive metabolic panel; Future    Lipid panel; Future

## 2024-11-29 NOTE — ASSESSMENT & PLAN NOTE
Patient still smoking 1/2 pack a day.  Denies shortness of breath, productive cough or other symptoms.  CT of the lungs was negative in 2023-patient has not gone for repeat yet.  Patient to have this done.  Recheck 3 to 6 months

## 2025-02-17 ENCOUNTER — HOSPITAL ENCOUNTER (OUTPATIENT)
Dept: NON INVASIVE DIAGNOSTICS | Facility: CLINIC | Age: 75
Discharge: HOME | End: 2025-02-17
Payer: MEDICARE

## 2025-02-17 DIAGNOSIS — I77.1 BILATERAL ILIAC ARTERY STENOSIS (HCC): ICD-10-CM

## 2025-02-17 DIAGNOSIS — Q25.1 ABDOMINAL AORTIC STENOSIS: ICD-10-CM

## 2025-02-17 PROCEDURE — 93925 LOWER EXTREMITY STUDY: CPT | Performed by: SURGERY

## 2025-02-17 PROCEDURE — 93922 UPR/L XTREMITY ART 2 LEVELS: CPT | Performed by: SURGERY

## 2025-02-17 PROCEDURE — 93978 VASCULAR STUDY: CPT | Performed by: SURGERY

## 2025-02-17 PROCEDURE — 93925 LOWER EXTREMITY STUDY: CPT

## 2025-02-17 PROCEDURE — 93978 VASCULAR STUDY: CPT

## 2025-02-17 PROCEDURE — 93923 UPR/LXTR ART STDY 3+ LVLS: CPT

## 2025-02-18 ENCOUNTER — RESULTS FOLLOW-UP (OUTPATIENT)
Dept: FAMILY MEDICINE CLINIC | Facility: CLINIC | Age: 75
End: 2025-02-18

## 2025-03-11 DIAGNOSIS — I70.212 ATHEROSCLER OF NATIVE ARTERY OF LEFT LEG WITH INTERMIT CLAUDICATION (HCC): ICD-10-CM

## 2025-03-13 RX ORDER — ATORVASTATIN CALCIUM 40 MG/1
40 TABLET, FILM COATED ORAL DAILY
Qty: 90 TABLET | Refills: 3 | Status: SHIPPED | OUTPATIENT
Start: 2025-03-13

## 2025-03-17 DIAGNOSIS — I10 ESSENTIAL HYPERTENSION: ICD-10-CM

## 2025-03-17 RX ORDER — AMLODIPINE BESYLATE 2.5 MG/1
2.5 TABLET ORAL DAILY
Qty: 90 TABLET | Refills: 1 | Status: SHIPPED | OUTPATIENT
Start: 2025-03-17

## 2025-04-28 ENCOUNTER — TELEPHONE (OUTPATIENT)
Age: 75
End: 2025-04-28

## 2025-04-28 NOTE — TELEPHONE ENCOUNTER
Patient had been scheduled for AWV for today 4/28/25. He was left a message that appt was cancelled and to R/S.  However I could find no availability with Dr. Robin to R/S his AWV.  Please advise where to R/S the patient.  Thank you.

## 2025-06-05 ENCOUNTER — TELEPHONE (OUTPATIENT)
Age: 75
End: 2025-06-05

## 2025-06-05 NOTE — TELEPHONE ENCOUNTER
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 Please advise.

## 2025-06-05 NOTE — TELEPHONE ENCOUNTER
Patient called regarding a cough.  Stoney states he has been dealing with a runny nose, bad cough with white phlegm but no fever for the past 2 weeks. He wanted to let PCP know and see if medication can be sent to his pharmacy or if he needs to be seen. He is scheduled for a MAW on 7/2.    Please advise and contact patient  Thank you.

## 2025-06-06 ENCOUNTER — OFFICE VISIT (OUTPATIENT)
Dept: FAMILY MEDICINE CLINIC | Facility: CLINIC | Age: 75
End: 2025-06-06
Payer: MEDICARE

## 2025-06-06 VITALS
DIASTOLIC BLOOD PRESSURE: 72 MMHG | HEIGHT: 69 IN | BODY MASS INDEX: 24.76 KG/M2 | SYSTOLIC BLOOD PRESSURE: 148 MMHG | HEART RATE: 88 BPM | TEMPERATURE: 96.8 F | WEIGHT: 167.2 LBS | OXYGEN SATURATION: 96 %

## 2025-06-06 DIAGNOSIS — R05.8 PRODUCTIVE COUGH: Primary | ICD-10-CM

## 2025-06-06 DIAGNOSIS — R09.89 RALES: ICD-10-CM

## 2025-06-06 DIAGNOSIS — J44.9 MODERATE COPD (CHRONIC OBSTRUCTIVE PULMONARY DISEASE) (HCC): ICD-10-CM

## 2025-06-06 DIAGNOSIS — L25.9 CONTACT DERMATITIS OF HAND: ICD-10-CM

## 2025-06-06 PROCEDURE — 99214 OFFICE O/P EST MOD 30 MIN: CPT | Performed by: FAMILY MEDICINE

## 2025-06-06 PROCEDURE — G2211 COMPLEX E/M VISIT ADD ON: HCPCS | Performed by: FAMILY MEDICINE

## 2025-06-06 RX ORDER — DEXTROMETHORPHAN HYDROBROMIDE AND PROMETHAZINE HYDROCHLORIDE 15; 6.25 MG/5ML; MG/5ML
5 SYRUP ORAL 4 TIMES DAILY PRN
Qty: 150 ML | Refills: 1 | Status: SHIPPED | OUTPATIENT
Start: 2025-06-06

## 2025-06-06 RX ORDER — CLOBETASOL PROPIONATE 0.5 MG/G
CREAM TOPICAL 2 TIMES DAILY
Qty: 30 G | Refills: 1 | Status: SHIPPED | OUTPATIENT
Start: 2025-06-06

## 2025-06-06 RX ORDER — AZITHROMYCIN 250 MG/1
TABLET, FILM COATED ORAL
Qty: 6 TABLET | Refills: 0 | Status: SHIPPED | OUTPATIENT
Start: 2025-06-06 | End: 2025-06-10

## 2025-06-06 NOTE — PROGRESS NOTES
Name: Stoney Lynch Jr.      : 1950      MRN: [id number]  Encounter Provider: Dilip Hernandez MD  Encounter Date: 2025   Encounter department: [facility]    Assessment & Plan  Productive cough  Focal rales on R mid lung posteriorly appreciated.  Check XR. Start z-pack and Prometh DM.  Smoking cessation discussed.  Recheck 4-5d if not improving  Orders:  •  XR chest pa and lateral; Future  •  azithromycin (ZITHROMAX) 250 mg tablet; 2 tab today then 1 tab po qd x 4 d  •  promethazine-dextromethorphan (PHENERGAN-DM) 6.25-15 mg/5 mL oral syrup; Take 5 mL by mouth 4 (four) times a day as needed for cough    Rales  Focal rales on R mid lung posteriorly appreciated.  Check XR. Start z-pack and Prometh DM.  Smoking cessation discussed.  Recheck 4-5d if not improving  Orders:  •  XR chest pa and lateral; Future  •  azithromycin (ZITHROMAX) 250 mg tablet; 2 tab today then 1 tab po qd x 4 d    Moderate COPD (chronic obstructive pulmonary disease) (HCC)  I reviewed with        Contact dermatitis of hand  I reviewed with pt. Appears contact.   Start clobetasol cream bid as directed. Recheck 1-2w if not resolved.   Orders:  •  clobetasol (TEMOVATE) 0.05 % cream; Apply topically 2 (two) times a day         History of Present Illness     75-year-old male presents with a 2-week history of productive cough.  This is associated with some nasal congestion.  He denies any fever, chills, sinus pain, sore throat or worsening shortness of breath.  He has tried Claritin OTC for the nasal congestion-she would help for approximately 10 hours.  He does note that while the Claritin was taking effect, his cough got a little bit better but with then come back.  Patient does smoke 1/2 pack a day.  - pt with erythematous raised pruritic areas on bilat fingers.  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  No other surfaces involved      Review of Systems   Constitutional: Negative.   "  HENT:  Positive for congestion. Negative for ear discharge, ear pain, sinus pressure and sinus pain.    Eyes: Negative.    Respiratory:  Positive for cough. Negative for shortness of breath and wheezing.    Cardiovascular: Negative.    Skin:  Positive for rash.     Past Medical History[1]  Past Surgical History[2]  Family History[3]  Social History[4]  Medications[5]  Allergies   Allergen Reactions   • Naproxen Swelling and Rash     Immunization History   Administered Date(s) Administered   • COVID-19 PFIZER VACCINE 0.3 ML IM 03/27/2021, 04/17/2021, 11/27/2021   • Influenza Split High Dose Preservative Free IM 10/09/2024   • Influenza, high dose seasonal 0.7 mL 11/15/2022, 10/27/2023   • Rabies Immune Globulin 02/19/2024   • Rabies-IM Human Diploid Cell Culture 02/19/2024   • Tdap 12/31/2021     Objective   /72   Pulse 88   Temp (!) 96.8 °F (36 °C)   Ht 5' 9\" (1.753 m)   Wt 75.8 kg (167 lb 3.2 oz)   SpO2 96%   BMI 24.69 kg/m²     Physical Exam  Vitals reviewed.   HENT:      Head: Normocephalic.      Right Ear: Tympanic membrane, ear canal and external ear normal.      Left Ear: Tympanic membrane, ear canal and external ear normal.      Nose: Congestion (mild) present.      Mouth/Throat:      Mouth: Mucous membranes are moist.      Pharynx: No oropharyngeal exudate or posterior oropharyngeal erythema.     Eyes:      Extraocular Movements: Extraocular movements intact.      Conjunctiva/sclera: Conjunctivae normal.      Pupils: Pupils are equal, round, and reactive to light.     Neck:      Comments: No JVD appreciated  Cardiovascular:      Rate and Rhythm: Normal rate and regular rhythm.   Pulmonary:      Effort: Pulmonary effort is normal.      Breath sounds: Rales (focal R mid lung posteriorly. No dullnes to percussion) present. No wheezing or rhonchi.     Musculoskeletal:      Cervical back: No tenderness.   Lymphadenopathy:      Cervical: No cervical adenopathy.     Neurological:      Mental Status: " He is alert.                [1]  Past Medical History:  Diagnosis Date   • Colon polyp    • COPD (chronic obstructive pulmonary disease) (HCC)    • Coronary artery disease    • GERD (gastroesophageal reflux disease)    • Hyperlipidemia    • Hypertension    • PVD (peripheral vascular disease) (HCC)    • Seasonal allergies    [2]  Past Surgical History:  Procedure Laterality Date   • APPENDECTOMY  2016   • CARDIAC SURGERY  2012    stents x3   • COLONOSCOPY  2008    boonswang   • KNEE ARTHROSCOPY Bilateral     post work injury   • [place] COLONOSCOPY FLX DX W/COLLJ SPEC WHEN PFRMD N/A 12/20/2018    Procedure: COLONOSCOPY;  Surgeon: Joss Castillo MD;  Location: [facility] GI [facility];  Service: Gastroenterology   • SHOULDER SURGERY Right     cheese tumor removed   • TONSILLECTOMY      age 22   [3]  Family History  Problem Relation Name Age of Onset   • Heart disease Mother     • Cancer Mother          breast   • No Known Problems Father     [4]  Social History  Tobacco Use   • Smoking status: Some Days     Current packs/day: 0.25     Average packs/day: 0.3 packs/day for 50.0 years (12.5 ttl pk-yrs)     Types: Cigarettes   • Smokeless tobacco: Never   Vaping Use   • Vaping status: Never Used   Substance and Sexual Activity   • Alcohol use: No   • Drug use: No   [5]  Current Outpatient Medications on File Prior to Visit   Medication Sig   • amLODIPine (NORVASC) 2.5 mg tablet TAKE ONE TABLET BY MOUTH EVERY DAY   • atorvastatin (LIPITOR) 40 mg tablet TAKE ONE TABLET BY MOUTH EVERY DAY   • loratadine (CLARITIN) 10 mg tablet Take 1 tablet (10 mg total) by mouth daily   • Multiple Vitamins-Minerals (CENTRUM ULTRA MENS) TABS Take by mouth   • Omega-3 Fatty Acids (Omega-3 Fish Oil) 500 MG CAPS in the morning and in the evening.   • triamcinolone (KENALOG) 0.1 % cream Apply topically 2 (two) times a day

## 2025-06-09 ENCOUNTER — HOSPITAL ENCOUNTER (OUTPATIENT)
Dept: RADIOLOGY | Facility: HOSPITAL | Age: 75
Discharge: HOME | End: 2025-06-09
Payer: MEDICARE

## 2025-06-09 DIAGNOSIS — R09.89 RALES: ICD-10-CM

## 2025-06-09 DIAGNOSIS — R05.8 PRODUCTIVE COUGH: ICD-10-CM

## 2025-06-09 PROCEDURE — 71046 X-RAY EXAM CHEST 2 VIEWS: CPT

## 2025-06-10 ENCOUNTER — RESULTS FOLLOW-UP (OUTPATIENT)
Dept: FAMILY MEDICINE CLINIC | Facility: CLINIC | Age: 75
End: 2025-06-10

## 2025-06-26 ENCOUNTER — RA CDI HCC (OUTPATIENT)
Dept: OTHER | Facility: HOSPITAL | Age: 75
End: 2025-06-26

## 2025-07-02 ENCOUNTER — OFFICE VISIT (OUTPATIENT)
Dept: FAMILY MEDICINE CLINIC | Facility: CLINIC | Age: 75
End: 2025-07-02
Payer: MEDICARE

## 2025-07-02 VITALS
DIASTOLIC BLOOD PRESSURE: 84 MMHG | BODY MASS INDEX: 24.73 KG/M2 | SYSTOLIC BLOOD PRESSURE: 136 MMHG | TEMPERATURE: 97.8 F | WEIGHT: 167 LBS | HEART RATE: 75 BPM | OXYGEN SATURATION: 98 % | HEIGHT: 69 IN

## 2025-07-02 DIAGNOSIS — Z00.00 MEDICARE ANNUAL WELLNESS VISIT, SUBSEQUENT: Primary | ICD-10-CM

## 2025-07-02 DIAGNOSIS — E11.9 TYPE 2 DIABETES MELLITUS WITHOUT COMPLICATION, WITH LONG-TERM CURRENT USE OF INSULIN (HCC): ICD-10-CM

## 2025-07-02 DIAGNOSIS — I10 ESSENTIAL HYPERTENSION: ICD-10-CM

## 2025-07-02 DIAGNOSIS — J44.9 MODERATE COPD (CHRONIC OBSTRUCTIVE PULMONARY DISEASE) (HCC): ICD-10-CM

## 2025-07-02 DIAGNOSIS — Z12.11 SCREEN FOR COLON CANCER: ICD-10-CM

## 2025-07-02 DIAGNOSIS — Z79.4 TYPE 2 DIABETES MELLITUS WITHOUT COMPLICATION, WITH LONG-TERM CURRENT USE OF INSULIN (HCC): ICD-10-CM

## 2025-07-02 DIAGNOSIS — Z12.5 SCREENING FOR PROSTATE CANCER: ICD-10-CM

## 2025-07-02 DIAGNOSIS — I70.213 ATHEROSCLEROSIS OF NATIVE ARTERIES OF EXTREMITIES WITH INTERMITTENT CLAUDICATION, BILATERAL LEGS (HCC): ICD-10-CM

## 2025-07-02 DIAGNOSIS — K63.5 POLYP OF COLON, UNSPECIFIED PART OF COLON, UNSPECIFIED TYPE: ICD-10-CM

## 2025-07-02 LAB — SL AMB POCT HEMOGLOBIN AIC: 7.8 (ref ?–6.5)

## 2025-07-02 PROCEDURE — G0444 DEPRESSION SCREEN ANNUAL: HCPCS | Performed by: FAMILY MEDICINE

## 2025-07-02 PROCEDURE — 83036 HEMOGLOBIN GLYCOSYLATED A1C: CPT | Performed by: FAMILY MEDICINE

## 2025-07-02 PROCEDURE — G2211 COMPLEX E/M VISIT ADD ON: HCPCS | Performed by: FAMILY MEDICINE

## 2025-07-02 PROCEDURE — 99214 OFFICE O/P EST MOD 30 MIN: CPT | Performed by: FAMILY MEDICINE

## 2025-07-02 PROCEDURE — G0439 PPPS, SUBSEQ VISIT: HCPCS | Performed by: FAMILY MEDICINE

## 2025-07-02 NOTE — ASSESSMENT & PLAN NOTE
I reviewed with pt. A1C above goal. Urged diet compliance.  Discussed starting meds- pt would like to retry diet.   Recheck 3m  Lab Results   Component Value Date    HGBA1C 7.8 (A) 07/02/2025   Orders:  •  POCT hemoglobin A1c  •  CBC and differential; Future  •  Comprehensive metabolic panel; Future  •  Lipid panel; Future  •  TSH, 3rd generation with Free T4 reflex; Future

## 2025-07-02 NOTE — PROGRESS NOTES
Name: Stoney Lynch Jr.      : 1950      MRN: [id number]  Encounter Provider: Dilip Hernandez MD  Encounter Date: 2025   Encounter department: [facility]  :  Assessment & Plan  Medicare annual wellness visit, subsequent         Type 2 diabetes mellitus without complication, with long-term current use of insulin (HCC)  I reviewed with pt. A1C above goal. Urged diet compliance.  Discussed starting meds- pt would like to retry diet.   Recheck 3m  Lab Results   Component Value Date    HGBA1C 7.8 (A) 2025   Orders:  •  POCT hemoglobin A1c  •  CBC and differential; Future  •  Comprehensive metabolic panel; Future  •  Lipid panel; Future  •  TSH, 3rd generation with Free T4 reflex; Future    Polyp of colon, unspecified part of colon, unspecified type  Refer for colonoscopy  Orders:  •  Ambulatory Referral to Gastroenterology; Future    Moderate COPD (chronic obstructive pulmonary disease) (HCC)  Breathing stable. Urged tobacco cessation.  Continue present care.  Recheck 6m       Essential hypertension  Well controlled. Cont present treatment. Monitor labs. Recheck 6m  Orders:  •  CBC and differential; Future  •  Comprehensive metabolic panel; Future  •  Lipid panel; Future    Atherosclerosis of native arteries of extremities with intermittent claudication, bilateral legs (HCC)  Asymptomatic.  Continue present care. F/u with Cardio   Recheck 3m       Screen for colon cancer    Orders:  •  Ambulatory Referral to Gastroenterology; Future    Screening for prostate cancer    Orders:  •  PSA, Total Screen; Future       Preventive health issues were discussed with patient, and age appropriate screening tests were ordered as noted in patient's After Visit Summary. Personalized health advice and appropriate referrals for health education or preventive services given if needed, as noted in patient's After Visit Summary.    History of Present Illness     f/u multiple med  issues and AWV  - pt still periodically cheats on his diet depending on stress. A1C still 7.8.  pt denies low BG episodes  - pt continues to smoke approx 1/2ppd. Pt denies SOB, wheeze or other breathing issues. Cough has improved since last visit  - pt remains active. Denies CP, palpitations, lightheadedness or other CV symptoms with or without exertion  - pt denies any new GI or Gu issues  - AWV done       Patient Care Team:  Dilip Hernandez MD as PCP - DO Rosendo Abrams MD Christopher Pogodzinski, MD Sinan Kutty, MD (Gastroenterology)  Joss Castillo MD as Endoscopist    Review of Systems   Constitutional: Negative.    HENT: Negative.     Eyes: Negative.    Respiratory: Negative.     Cardiovascular: Negative.    Gastrointestinal: Negative.    Endocrine: Negative.    Genitourinary: Negative.    Musculoskeletal: Negative.    Skin: Negative.    Allergic/Immunologic: Negative.    Neurological: Negative.    Hematological: Negative.    Psychiatric/Behavioral: Negative.       Medical History Reviewed by provider this encounter:  Tobacco  Allergies  Meds  Problems  Med Hx  Surg Hx  Fam Hx       Annual Wellness Visit Questionnaire   Stoney is here for his Subsequent Wellness visit. Last Medicare Wellness visit information reviewed, patient interviewed and updates made to the record today.      Health Risk Assessment:   Patient rates overall health as good. Patient feels that their physical health rating is same. Patient is very satisfied with their life. Eyesight was rated as same. Hearing was rated as same. Patient feels that their emotional and mental health rating is same. Patients states they are often angry. Patient states they are never, rarely unusually tired/fatigued. Pain experienced in the last 7 days has been none. Patient states that he has experienced no weight loss or gain in last 6 months.     Depression Screening:   PHQ-2 Score: 0      Fall Risk Screening:   In  the past year, patient has experienced: no history of falling in past year      Home Safety:  Patient does not have trouble with stairs inside or outside of their home. Patient has working smoke alarms and has working carbon monoxide detector. Home safety hazards include: none.     Nutrition:   Current diet is Regular and Unhealthy.     Medications:   Patient is currently taking over-the-counter supplements. OTC medications include: see medication list. Patient is able to manage medications.     Activities of Daily Living (ADLs)/Instrumental Activities of Daily Living (IADLs):   Walk and transfer into and out of bed and chair?: Yes  Dress and groom yourself?: Yes    Bathe or shower yourself?: Yes    Feed yourself? Yes  Do your laundry/housekeeping?: Yes  Manage your money, pay your bills and track your expenses?: Yes  Make your own meals?: Yes    Do your own shopping?: Yes    Previous Hospitalizations:   Any hospitalizations or ED visits within the last 12 months?: No      Advance Care Planning:   Living will: No    Durable POA for healthcare: No    Advanced directive: No    Advanced directive counseling given: Yes    ACP document given: Yes      Cognitive Screening:   Provider or family/friend/caregiver concerned regarding cognition?: No    Preventive Screenings      Cardiovascular Screening:    General: Screening Current      Diabetes Screening:     General: Screening Not Indicated and History Diabetes      Colorectal Cancer Screening:     General: Screening Current      Prostate Cancer Screening:    General: Risks and Benefits Discussed    Due for: PSA      Osteoporosis Screening:    General: Screening Not Indicated      Abdominal Aortic Aneurysm (AAA) Screening:    Risk factors include: age between 65-76 yo and tobacco use        General: Risks and Benefits Discussed    Due for: Screening AAA Ultrasound      Lung Cancer Screening:     General: Screening Not Indicated      Hepatitis C Screening:    General: Risks  and Benefits Discussed    Immunizations:  - Immunizations due: Prevnar 20 and Zoster (Shingrix)    Screening, Brief Intervention, and Referral to Treatment (SBIRT)     Screening      AUDIT-C Screenin) How often did you have a drink containing alcohol in the past year? never  2) How many drinks did you have on a typical day when you were drinking in the past year? 0  3) How often did you have 6 or more drinks on one occasion in the past year? never    AUDIT-C Score: 0  Interpretation: Score 0-3 (male): Negative screen for alcohol misuse    Single Item Drug Screening:  How often have you used an illegal drug (including marijuana) or a prescription medication for non-medical reasons in the past year? never    Single Item Drug Screen Score: 0  Interpretation: Negative screen for possible drug use disorder    Time Spent  Time spent screening/evaluating the patient for alcohol misuse: 1 minutes.     Annual Depression Screening  Time spent screening and evaluating the patient for depression during today's encounter was 5 minutes.    Other Counseling Topics:   Calcium and vitamin D intake and regular weightbearing exercise.     Social Drivers of Health     Food Insecurity: No Food Insecurity (2025)    Nursing - Inadequate Food Risk Classification    • Worried About Running Out of Food in the Last Year: Never true    • Ran Out of Food in the Last Year: Never true   Transportation Needs: No Transportation Needs (2025)    PRAPARE - Transportation    • Lack of Transportation (Medical): No    • Lack of Transportation (Non-Medical): No   Housing Stability: Low Risk  (2025)    Housing Stability Vital Sign    • Unable to Pay for Housing in the Last Year: No    • Number of Times Moved in the Last Year: 0    • Homeless in the Last Year: No   Utilities: Not At Risk (2025)    [facility] Utilities    • Threatened with loss of utilities: No     No results found.    Objective   /84   Pulse 75   Temp 97.8 °F (36.6 °C)  "  Ht 5' 8.82\" (1.748 m)   Wt 75.8 kg (167 lb)   SpO2 98%   BMI 24.79 kg/m²     Physical Exam  Vitals reviewed.   HENT:      Head: Normocephalic.      Right Ear: Tympanic membrane, ear canal and external ear normal.      Left Ear: Tympanic membrane, ear canal and external ear normal.      Nose: Nose normal.      Mouth/Throat:      Mouth: Mucous membranes are moist.      Pharynx: No oropharyngeal exudate or posterior oropharyngeal erythema.     Eyes:      Extraocular Movements: Extraocular movements intact.      Conjunctiva/sclera: Conjunctivae normal.      Pupils: Pupils are equal, round, and reactive to light.     Neck:      Comments: No JVD appreciated  Cardiovascular:      Rate and Rhythm: Normal rate and regular rhythm.   Pulmonary:      Effort: Pulmonary effort is normal.      Breath sounds: Normal breath sounds. No wheezing, rhonchi or rales.   Abdominal:      General: There is no distension.      Palpations: There is no mass.      Tenderness: There is no abdominal tenderness.     Musculoskeletal:         General: Deformity (mild diffuse OA changes) present. No swelling or tenderness. Normal range of motion.      Cervical back: No tenderness.      Right lower leg: No edema.      Left lower leg: No edema.   Lymphadenopathy:      Cervical: No cervical adenopathy.     Skin:     General: Skin is warm.      Capillary Refill: Capillary refill takes less than 2 seconds.     Neurological:      Mental Status: He is alert.      Cranial Nerves: No cranial nerve deficit.      Sensory: No sensory deficit.      Motor: No weakness.      Gait: Gait normal.     Psychiatric:         Mood and Affect: Mood normal.         Behavior: Behavior normal.         Thought Content: Thought content normal.         Judgment: Judgment normal.      Comments: PHQ-2/9 Depression Screening    Little interest or pleasure in doing things: 0 - not at all  Feeling down, depressed, or hopeless: 0 - not at all  PHQ-2 Score: 0  PHQ-2 Interpretation: " Negative depression screen

## (undated) DEVICE — DISPOSABLE BIOPSY VALVE MAJ-1555: Brand: SINGLE USE BIOPSY VALVE (STERILE)

## (undated) DEVICE — ENDOCUFF VISION SMALL PURPLE ID 10.4

## (undated) DEVICE — TUBING BUBBLE CLEAR 5MM X 100 FT NS

## (undated) DEVICE — "MAJ-901 WATER CONTAINER SET CV-160/140": Brand: WATER CONTAINER

## (undated) DEVICE — "MH-438 A/W VLVE F/140 EVIS-140": Brand: AIR/WATER VALVE

## (undated) DEVICE — "MH-443 SUCTION VALVE F/EVIS140 EVIS160": Brand: SUCTION VALVE

## (undated) DEVICE — BRUSH ENDO CLEANING DBL-HEADER

## (undated) DEVICE — SOLIDIFIER FLUID WASTE CONTROL 1500ML

## (undated) DEVICE — MEDI-VAC YANKAUER SUCTION HANDLE: Brand: CARDINAL HEALTH

## (undated) DEVICE — 1200CC GUARDIAN II: Brand: GUARDIAN

## (undated) DEVICE — AIRLIFE™  ADULT CUSHION NASAL CANNULA WITH 7 FOOT (2.1 M) CRUSH-RESISTANT OXYGEN TUBING, AND U/CONNECT-IT ADAPTER: Brand: AIRLIFE™

## (undated) DEVICE — GLOVE EXAM NON-STRL NTRL PLUS LRG PF

## (undated) DEVICE — TRAP POLY

## (undated) DEVICE — LUBRICANT SURGILUBE TUBE 4 OZ  FLIP TOP

## (undated) DEVICE — GAUZE SPONGES,16 PLY: Brand: CURITY

## (undated) DEVICE — TUBING AUX CHANNEL

## (undated) DEVICE — THE EXACTO COLD SNARE IS INTENDED TO BE USED WITHOUT DIATHERMIC ENERGY FOR THE ENDOSCOPIC RESECTION OF POLYP TISSUE IN THE GASTROINTESTINAL TRACT.: Brand: EXACTO

## (undated) DEVICE — GROUNDING PAD UNIVERSAL SLW

## (undated) DEVICE — SINGLE-USE POLYPECTOMY SNARE: Brand: SENSATION SHORT THROW